# Patient Record
Sex: MALE | Race: WHITE | NOT HISPANIC OR LATINO | Employment: OTHER | URBAN - METROPOLITAN AREA
[De-identification: names, ages, dates, MRNs, and addresses within clinical notes are randomized per-mention and may not be internally consistent; named-entity substitution may affect disease eponyms.]

---

## 2017-10-07 ENCOUNTER — HOSPITAL ENCOUNTER (EMERGENCY)
Facility: HOSPITAL | Age: 72
Discharge: HOME | End: 2017-10-07
Attending: EMERGENCY MEDICINE | Admitting: EMERGENCY MEDICINE
Payer: MEDICARE

## 2017-10-07 ENCOUNTER — APPOINTMENT (EMERGENCY)
Dept: CT IMAGING | Facility: HOSPITAL | Age: 72
End: 2017-10-07
Payer: MEDICARE

## 2017-10-07 VITALS
DIASTOLIC BLOOD PRESSURE: 97 MMHG | RESPIRATION RATE: 20 BRPM | WEIGHT: 234.4 LBS | SYSTOLIC BLOOD PRESSURE: 201 MMHG | TEMPERATURE: 98 F | HEART RATE: 69 BPM | OXYGEN SATURATION: 97 %

## 2017-10-07 DIAGNOSIS — R10.9 ABDOMINAL PAIN, UNSPECIFIED ABDOMINAL LOCATION: Primary | ICD-10-CM

## 2017-10-07 LAB
ALBUMIN SERPL BCP-MCNC: 3.7 G/DL (ref 3.5–5)
ALP SERPL-CCNC: 122 U/L (ref 46–116)
ALT SERPL W P-5'-P-CCNC: 29 U/L (ref 12–78)
ANION GAP SERPL CALCULATED.3IONS-SCNC: 5 MMOL/L (ref 4–13)
APTT PPP: 33 SECONDS (ref 23–35)
AST SERPL W P-5'-P-CCNC: 16 U/L (ref 5–45)
BASOPHILS # BLD AUTO: 0.06 THOUSANDS/ΜL (ref 0–0.1)
BASOPHILS NFR BLD AUTO: 1 % (ref 0–1)
BILIRUB SERPL-MCNC: 0.3 MG/DL (ref 0.2–1)
BUN SERPL-MCNC: 24 MG/DL (ref 5–25)
CALCIUM SERPL-MCNC: 8.9 MG/DL (ref 8.3–10.1)
CHLORIDE SERPL-SCNC: 102 MMOL/L (ref 100–108)
CO2 SERPL-SCNC: 28 MMOL/L (ref 21–32)
CREAT SERPL-MCNC: 1.15 MG/DL (ref 0.6–1.3)
EOSINOPHIL # BLD AUTO: 0.19 THOUSAND/ΜL (ref 0–0.61)
EOSINOPHIL NFR BLD AUTO: 2 % (ref 0–6)
ERYTHROCYTE [DISTWIDTH] IN BLOOD BY AUTOMATED COUNT: 14.7 % (ref 11.6–15.1)
EXT BILIRUBIN, UA: NORMAL
EXT BLOOD URINE: NORMAL
EXT GLUCOSE, UA: NORMAL
EXT KETONES: NORMAL
EXT NITRITE, UA: NORMAL
EXT PH, UA: 6.5
EXT PROTEIN, UA: NORMAL
EXT SPECIFIC GRAVITY, UA: 1005
EXT UROBILINOGEN: NORMAL
GFR SERPL CREATININE-BSD FRML MDRD: 63 ML/MIN/1.73SQ M
GLUCOSE SERPL-MCNC: 135 MG/DL (ref 65–140)
HCT VFR BLD AUTO: 37.4 % (ref 36.5–49.3)
HGB BLD-MCNC: 12.2 G/DL (ref 12–17)
INR PPP: 0.9 (ref 0.86–1.16)
LIPASE SERPL-CCNC: 102 U/L (ref 73–393)
LYMPHOCYTES # BLD AUTO: 1.61 THOUSANDS/ΜL (ref 0.6–4.47)
LYMPHOCYTES NFR BLD AUTO: 18 % (ref 14–44)
MCH RBC QN AUTO: 27.8 PG (ref 26.8–34.3)
MCHC RBC AUTO-ENTMCNC: 32.6 G/DL (ref 31.4–37.4)
MCV RBC AUTO: 85 FL (ref 82–98)
MONOCYTES # BLD AUTO: 0.77 THOUSAND/ΜL (ref 0.17–1.22)
MONOCYTES NFR BLD AUTO: 9 % (ref 4–12)
NEUTROPHILS # BLD AUTO: 6.18 THOUSANDS/ΜL (ref 1.85–7.62)
NEUTS SEG NFR BLD AUTO: 70 % (ref 43–75)
PLATELET # BLD AUTO: 210 THOUSANDS/UL (ref 149–390)
PMV BLD AUTO: 10.1 FL (ref 8.9–12.7)
POTASSIUM SERPL-SCNC: 3.7 MMOL/L (ref 3.5–5.3)
PROT SERPL-MCNC: 7.8 G/DL (ref 6.4–8.2)
PROTHROMBIN TIME: 12.4 SECONDS (ref 12.1–14.4)
RBC # BLD AUTO: 4.39 MILLION/UL (ref 3.88–5.62)
SODIUM SERPL-SCNC: 135 MMOL/L (ref 136–145)
WBC # BLD AUTO: 8.81 THOUSAND/UL (ref 4.31–10.16)
WBC # BLD EST: NORMAL 10*3/UL

## 2017-10-07 PROCEDURE — 36415 COLL VENOUS BLD VENIPUNCTURE: CPT | Performed by: PHYSICIAN ASSISTANT

## 2017-10-07 PROCEDURE — 81002 URINALYSIS NONAUTO W/O SCOPE: CPT | Performed by: PHYSICIAN ASSISTANT

## 2017-10-07 PROCEDURE — 99284 EMERGENCY DEPT VISIT MOD MDM: CPT

## 2017-10-07 PROCEDURE — 80053 COMPREHEN METABOLIC PANEL: CPT | Performed by: PHYSICIAN ASSISTANT

## 2017-10-07 PROCEDURE — 85025 COMPLETE CBC W/AUTO DIFF WBC: CPT | Performed by: PHYSICIAN ASSISTANT

## 2017-10-07 PROCEDURE — 74177 CT ABD & PELVIS W/CONTRAST: CPT

## 2017-10-07 PROCEDURE — 85730 THROMBOPLASTIN TIME PARTIAL: CPT | Performed by: PHYSICIAN ASSISTANT

## 2017-10-07 PROCEDURE — 85610 PROTHROMBIN TIME: CPT | Performed by: PHYSICIAN ASSISTANT

## 2017-10-07 PROCEDURE — 83690 ASSAY OF LIPASE: CPT | Performed by: PHYSICIAN ASSISTANT

## 2017-10-07 RX ORDER — TRAMADOL HYDROCHLORIDE 50 MG/1
50 TABLET ORAL EVERY 6 HOURS PRN
Qty: 12 TABLET | Refills: 0 | Status: SHIPPED | OUTPATIENT
Start: 2017-10-07 | End: 2017-10-17

## 2017-10-07 RX ORDER — PANTOPRAZOLE SODIUM 40 MG/1
40 TABLET, DELAYED RELEASE ORAL DAILY
Qty: 30 TABLET | Refills: 0 | Status: SHIPPED | OUTPATIENT
Start: 2017-10-07 | End: 2018-05-03

## 2017-10-07 RX ADMIN — IOHEXOL 100 ML: 350 INJECTION, SOLUTION INTRAVENOUS at 17:51

## 2017-10-07 NOTE — ED PROVIDER NOTES
History  Chief Complaint   Patient presents with    Abdominal Pain     Pt  c/o right sided abdominal pain x 3 days, increases when eating  79-year-old male presents to the emergency department with complaints of abdominal pain  States that he has had some right upper quadrant and epigastric burning pain in his abdomen over the past 3 days  States that it gets worse after eating  He denies any fevers at home  No nausea, vomiting, or diarrhea  Denies constipation  Previous significant past surgical history significant for cholecystectomy  States that 3 weeks ago he did have an injury to the area where he fell and had some pain in the anterior chest wall  States he has been taking Tylenol Motrin for pain at home with some relief  History provided by:  Patient   used: No        None       Past Medical History:   Diagnosis Date    Hypertension        Past Surgical History:   Procedure Laterality Date    CHOLECYSTECTOMY      LEG SURGERY         History reviewed  No pertinent family history  I have reviewed and agree with the history as documented  Social History   Substance Use Topics    Smoking status: Never Smoker    Smokeless tobacco: Never Used    Alcohol use No        Review of Systems   Constitutional: Negative for activity change, appetite change, chills and fever  HENT: Negative for congestion, dental problem, drooling, ear discharge, ear pain, mouth sores, nosebleeds, rhinorrhea, sore throat and trouble swallowing  Eyes: Negative for pain, discharge and itching  Respiratory: Negative for cough, chest tightness, shortness of breath and wheezing  Cardiovascular: Negative for chest pain and palpitations  Gastrointestinal: Positive for abdominal pain  Negative for blood in stool, constipation, diarrhea, nausea and vomiting  Endocrine: Negative for cold intolerance and heat intolerance     Genitourinary: Negative for difficulty urinating, dysuria, flank pain, frequency and urgency  Skin: Negative for rash and wound  Allergic/Immunologic: Negative for food allergies and immunocompromised state  Neurological: Negative for dizziness, seizures, syncope, weakness, numbness and headaches  Psychiatric/Behavioral: Negative for agitation, behavioral problems and confusion  Physical Exam  ED Triage Vitals   Temperature Pulse Respirations Blood Pressure SpO2   10/07/17 1556 10/07/17 1552 10/07/17 1552 10/07/17 1552 10/07/17 1552   98 °F (36 7 °C) 72 18 (!) 204/93 97 %      Temp src Heart Rate Source Patient Position - Orthostatic VS BP Location FiO2 (%)   -- 10/07/17 1552 10/07/17 1552 10/07/17 1552 --    Monitor Sitting Left arm       Pain Score       10/07/17 1656       6           Physical Exam   Constitutional: He is oriented to person, place, and time  He appears well-developed and well-nourished  No distress  HENT:   Head: Normocephalic and atraumatic  Right Ear: External ear normal    Left Ear: External ear normal    Mouth/Throat: Oropharynx is clear and moist  No oropharyngeal exudate  Eyes: Conjunctivae are normal    Neck: No JVD present  No tracheal deviation present  Cardiovascular: Normal rate, regular rhythm and normal heart sounds  Exam reveals no gallop and no friction rub  No murmur heard  Pulmonary/Chest: Effort normal and breath sounds normal  No respiratory distress  He has no wheezes  He has no rales  He exhibits no tenderness  Abdominal: Soft  Bowel sounds are normal  He exhibits no distension  There is tenderness in the right upper quadrant and epigastric area  There is no guarding  Musculoskeletal: Normal range of motion  He exhibits no edema, tenderness or deformity  Lymphadenopathy:     He has no cervical adenopathy  Neurological: He is alert and oriented to person, place, and time  Skin: Skin is warm and dry  No rash noted  He is not diaphoretic  No erythema  Psychiatric: He has a normal mood and affect   His behavior is normal    Nursing note and vitals reviewed  ED Medications  Medications   sodium chloride 0 9 % bolus 1,000 mL (1,000 mL Intravenous Not Given 10/7/17 1643)   iohexol (OMNIPAQUE) 350 MG/ML injection (MULTI-DOSE) 100 mL (100 mL Intravenous Given 10/7/17 1751)       Diagnostic Studies  Labs Reviewed   COMPREHENSIVE METABOLIC PANEL - Abnormal        Result Value Ref Range Status    Sodium 135 (*) 136 - 145 mmol/L Final    Alkaline Phosphatase 122 (*) 46 - 116 U/L Final    Potassium 3 7  3 5 - 5 3 mmol/L Final    Chloride 102  100 - 108 mmol/L Final    CO2 28  21 - 32 mmol/L Final    Anion Gap 5  4 - 13 mmol/L Final    BUN 24  5 - 25 mg/dL Final    Creatinine 1 15  0 60 - 1 30 mg/dL Final    Comment: Standardized to IDMS reference method    Glucose 135  65 - 140 mg/dL Final    Comment:   If the patient is fasting, the ADA then defines impaired fasting glucose as > 100 mg/dL and diabetes as > or equal to 123 mg/dL  Specimen collection should occur prior to Sulfasalazine administration due to the potential for falsely depressed results  Specimen collection should occur prior to Sulfapyridine administration due to the potential for falsely elevated results  Calcium 8 9  8 3 - 10 1 mg/dL Final    AST 16  5 - 45 U/L Final    Comment:   Specimen collection should occur prior to Sulfasalazine administration due to the potential for falsely depressed results  ALT 29  12 - 78 U/L Final    Comment:   Specimen collection should occur prior to Sulfasalazine administration due to the potential for falsely depressed results       Total Protein 7 8  6 4 - 8 2 g/dL Final    Albumin 3 7  3 5 - 5 0 g/dL Final    Total Bilirubin 0 30  0 20 - 1 00 mg/dL Final    eGFR 63  ml/min/1 73sq m Final    Narrative:     National Kidney Disease Education Program recommendations are as follows:  GFR calculation is accurate only with a steady state creatinine  Chronic Kidney disease less than 60 ml/min/1 73 sq  meters  Kidney failure less than 15 ml/min/1 73 sq  meters  CBC AND DIFFERENTIAL - Normal    WBC 8 81  4 31 - 10 16 Thousand/uL Final    RBC 4 39  3 88 - 5 62 Million/uL Final    Hemoglobin 12 2  12 0 - 17 0 g/dL Final    Hematocrit 37 4  36 5 - 49 3 % Final    MCV 85  82 - 98 fL Final    MCH 27 8  26 8 - 34 3 pg Final    MCHC 32 6  31 4 - 37 4 g/dL Final    RDW 14 7  11 6 - 15 1 % Final    MPV 10 1  8 9 - 12 7 fL Final    Platelets 834  967 - 390 Thousands/uL Final    Neutrophils Relative 70  43 - 75 % Final    Lymphocytes Relative 18  14 - 44 % Final    Monocytes Relative 9  4 - 12 % Final    Eosinophils Relative 2  0 - 6 % Final    Basophils Relative 1  0 - 1 % Final    Neutrophils Absolute 6 18  1 85 - 7 62 Thousands/µL Final    Lymphocytes Absolute 1 61  0 60 - 4 47 Thousands/µL Final    Monocytes Absolute 0 77  0 17 - 1 22 Thousand/µL Final    Eosinophils Absolute 0 19  0 00 - 0 61 Thousand/µL Final    Basophils Absolute 0 06  0 00 - 0 10 Thousands/µL Final   LIPASE - Normal    Lipase 102  73 - 393 u/L Final   PROTIME-INR - Normal    Protime 12 4  12 1 - 14 4 seconds Final    INR 0 90  0 86 - 1 16 Final   APTT - Normal    PTT 33  23 - 35 seconds Final    Narrative: Therapeutic Heparin Range = 60-90 seconds   POCT URINALYSIS DIPSTICK - Normal    EXT Glucose, UA neg   Final    EXT Bilirubin, UA (Ref: Negative) neg   Final    EXT Ketones, UA (Ref: Negative) neg   Final    EXT Spec Grav, UA 1,005   Final    EXT Blood, UA (Ref: Negative) neg   Final    EXT pH, UA 6 5   Final    EXT Protein, UA (Ref: Negative) neg   Final    EXT Urobilinogen, UA (Ref: 0 2- 1 0) neg   Final    EXT Leukocytes, UA (Ref: Negative) neg   Final    EXT Nitrite, UA (Ref: Negative) neg   Final       CT abdomen pelvis with contrast   Final Result      Colonic diverticulosis with no evidence of acute diverticulitis  No signs of bowel obstruction or inflammation  Moderate degenerative disc disease of the thoracolumbar spine  Workstation performed: [id number]             Procedures  Procedures      Phone Contacts  ED Phone Contact    ED Course  ED Course                                MDM  Number of Diagnoses or Management Options  Diagnosis management comments: Differential diagnosis includes but not limited to:  Gastritis, pancreatitis, intra-abdominal injury, chest wall pain, ulcer formation  Amount and/or Complexity of Data Reviewed  Clinical lab tests: ordered and reviewed  Tests in the radiology section of CPT®: ordered and reviewed      CritCare Time    Disposition  Final diagnoses:   Abdominal pain, unspecified abdominal location     ED Disposition     ED Disposition Condition Comment    Discharge  Juan Álvarez discharge to home/self care  Condition at discharge: Stable        Follow-up Information     Follow up With Specialties Details Why [address] MD Family Medicine Schedule an appointment as soon as possible for a visit  [facility] [address] [place] [id number]  [phone]          Patient's Medications   Discharge Prescriptions    TRAMADOL (ULTRAM) 50 MG TABLET    Take 1 tablet by mouth every 6 (six) hours as needed for moderate pain for up to 10 days       Start Date: 10/7/2017 End Date: 10/17/2017       Order Dose: 50 mg       Quantity: 12 tablet    Refills: 0     No discharge procedures on file      ED Provider  Electronically Signed by       Deidra Cockayne, PA-C  10/07/17 6618

## 2017-10-07 NOTE — DISCHARGE INSTRUCTIONS
Abdominal Pain   WHAT YOU NEED TO KNOW:   Abdominal pain can be dull, achy, or sharp  You may have pain in one area of your abdomen, or in your entire abdomen  Your pain may be caused by a condition such as constipation, food sensitivity or poisoning, infection, or a blockage  Abdominal pain can also be from a hernia, appendicitis, or an ulcer  Liver, gallbladder, or kidney conditions can also cause abdominal pain  The cause of your abdominal pain may be unknown  DISCHARGE INSTRUCTIONS:   Return to the emergency department if:   · You have new chest pain or shortness of breath  · You have pulsing pain in your upper abdomen or lower back that suddenly becomes constant  · Your pain is in the right lower abdominal area and worsens with movement  · You have a fever over 100 4°F (38°C) or shaking chills  · You are vomiting and cannot keep food or liquids down  · Your pain does not improve or gets worse over the next 8 to 12 hours  · You see blood in your vomit or bowel movements, or they look black and tarry  · Your skin or the whites of your eyes turn yellow  · You are a woman and have a large amount of vaginal bleeding that is not your monthly period  Contact your healthcare provider if:   · You have pain in your lower back  · You are a man and have pain in your testicles  · You have pain when you urinate  · You have questions or concerns about your condition or care  Follow up with your healthcare provider within 24 hours or as directed:  Write down your questions so you remember to ask them during your visits  Medicines:   · Medicines  may be given to calm your stomach and prevent vomiting or to decrease pain  Ask how to take pain medicine safely  · Take your medicine as directed  Contact your healthcare provider if you think your medicine is not helping or if you have side effects  Tell him of her if you are allergic to any medicine   Keep a list of the medicines, vitamins, and herbs you take  Include the amounts, and when and why you take them  Bring the list or the pill bottles to follow-up visits  Carry your medicine list with you in case of an emergency  © 2017 [address] Information is for End User's use only and may not be sold, redistributed or otherwise used for commercial purposes  All illustrations and images included in CareNotes® are the copyrighted property of [employer] M [employer]  or Alvin Young  The above information is an  only  It is not intended as medical advice for individual conditions or treatments  Talk to your doctor, nurse or pharmacist before following any medical regimen to see if it is safe and effective for you

## 2018-05-03 ENCOUNTER — HOSPITAL ENCOUNTER (EMERGENCY)
Facility: HOSPITAL | Age: 73
Discharge: HOME | End: 2018-05-04
Attending: EMERGENCY MEDICINE | Admitting: EMERGENCY MEDICINE
Payer: MEDICARE

## 2018-05-03 ENCOUNTER — APPOINTMENT (EMERGENCY)
Dept: RADIOLOGY | Facility: HOSPITAL | Age: 73
End: 2018-05-03
Payer: MEDICARE

## 2018-05-03 VITALS
DIASTOLIC BLOOD PRESSURE: 84 MMHG | OXYGEN SATURATION: 96 % | TEMPERATURE: 98.4 F | HEART RATE: 78 BPM | RESPIRATION RATE: 20 BRPM | BODY MASS INDEX: 36.26 KG/M2 | WEIGHT: 231.48 LBS | SYSTOLIC BLOOD PRESSURE: 170 MMHG

## 2018-05-03 DIAGNOSIS — S73.015A: Primary | ICD-10-CM

## 2018-05-03 PROCEDURE — 96374 THER/PROPH/DIAG INJ IV PUSH: CPT

## 2018-05-03 PROCEDURE — 73502 X-RAY EXAM HIP UNI 2-3 VIEWS: CPT

## 2018-05-03 PROCEDURE — 73501 X-RAY EXAM HIP UNI 1 VIEW: CPT

## 2018-05-03 RX ORDER — PROPOFOL 10 MG/ML
100 INJECTION, EMULSION INTRAVENOUS ONCE
Status: COMPLETED | OUTPATIENT
Start: 2018-05-03 | End: 2018-05-03

## 2018-05-03 RX ADMIN — PROPOFOL 100 MG: 10 INJECTION, EMULSION INTRAVENOUS at 23:14

## 2018-05-03 RX ADMIN — HYDROMORPHONE HYDROCHLORIDE 1 MG: 1 INJECTION, SOLUTION INTRAMUSCULAR; INTRAVENOUS; SUBCUTANEOUS at 22:37

## 2018-05-04 PROCEDURE — 99284 EMERGENCY DEPT VISIT MOD MDM: CPT

## 2018-05-04 NOTE — ED PROCEDURE NOTE
PROCEDURE  Procedural Sedation  Date/Time: 5/3/2018 11:29 PM  Performed by: Kristel Ralph  Authorized by: Kristel Ralph     Consent:     Consent obtained:  Written    Consent given by:  Patient  Universal protocol:     Patient identity confirmation method:  Verbally with patient  Indications:     Sedation purpose:  Dislocation reduction    Procedure necessitating sedation performed by:  Different physician    Intended level of sedation:  Moderate (conscious sedation)  Pre-sedation assessment:     ASA classification: class 2 - patient with mild systemic disease      Mallampati score:  III - soft palate, base of uvula visible    Pre-sedation assessments completed and reviewed: airway patency, cardiovascular function, mental status, nausea/vomiting, respiratory function and temperature    Immediate pre-procedure details:     Reassessment: Patient reassessed immediately prior to procedure      Reviewed: vital signs      Verified: bag valve mask available, emergency equipment available, intubation equipment available, IV patency confirmed, oxygen available and suction available    Procedure details (see MAR for exact dosages):     Preoxygenation:  Nasal cannula    Sedation:  Propofol    Intra-procedure monitoring:  Blood pressure monitoring, cardiac monitor, continuous pulse oximetry, continuous capnometry, frequent vital sign checks and frequent LOC assessments    Intra-procedure events: none    Post-procedure details:     Attendance: Constant attendance by certified staff until patient recovered      Recovery: Patient returned to pre-procedure baseline      Post-sedation assessments completed and reviewed: airway patency, cardiovascular function, hydration status, mental status, nausea/vomiting and respiratory function      Patient tolerance:   Tolerated well, no immediate complications         Dell Pelayo MD  05/03/18 0675

## 2018-05-04 NOTE — DISCHARGE INSTRUCTIONS
Hip Dislocation   WHAT YOU NEED TO KNOW:   A hip dislocation occurs when your thigh bone is forced out of your hip socket  DISCHARGE INSTRUCTIONS:   Medicines: You may need any of the following:  · Pain medicine  helps decrease or take away your pain  Do not wait until the pain is severe before you take this medicine  · Take your medicine as directed  Contact your healthcare provider if you think your medicine is not helping or if you have side effects  Tell him of her if you are allergic to any medicine  Keep a list of the medicines, vitamins, and herbs you take  Include the amounts, and when and why you take them  Bring the list or the pill bottles to follow-up visits  Carry your medicine list with you in case of an emergency  Follow up with your healthcare provider or orthopedist as directed: You may need another x-ray or CT scan  Write down your questions so you remember to ask them during your visits  Self-care: It will take 2 to 3 months for your hip to heal   · Use a walker or crutches as directed  Ask your healthcare provider or orthopedist when you can put weight on your injured side  As your hip heals, use a cane to help you walk until your limp goes away  · Avoid high-impact activities and sports  for 6 to 12 weeks or until your hip strength has returned  Physical therapy:  Go to physical therapy as directed  A physical therapist teaches you exercises to increase the range of motion in your hip  Exercises also make your hip stronger and decrease pain  Prevent another hip dislocation:  Follow these precautions for 6 weeks after your injury or as directed:  · Sit with your back straight and your feet flat on the floor  Do not cross your legs  Do not lean forward when you sit in a chair  · Keep your knees apart  Place a pillow or wedge between your knees when you sit or lie down  Do not twist your knees  Do not lift your knees higher than your hips  · Do not sit in a low chair  Use armrests and your upper body strength to push yourself up from a sitting position  · Do not bend at the waist to  an object from the floor  Bend your knees to reach the object, or use a tool to pick it up  Wound care: If you had surgery, ask how to care for your wound  Carefully wash the wound with soap and water  Dry the area and put on new, clean bandages as directed  Change your bandages when they get wet or dirty  Contact your healthcare provider if:   · You have a fever  · Your incision is red, swollen, or draining pus  · You have numbness in your leg or foot  · You have pain that does not go away after you take pain medicine  · You cannot walk well with your cane or crutches  · You have questions or concerns about your condition or care  Return to the emergency department if:   · You have severe pain  · You dislocate your hip again  · Your incision comes apart, or blood soaks through your bandage  © 2017 [facility] Information is for End User's use only and may not be sold, redistributed or otherwise used for commercial purposes  All illustrations and images included in CareNotes® are the copyrighted property of A D [employer] M , Inc  or Alvindariana Young  The above information is an  only  It is not intended as medical advice for individual conditions or treatments  Talk to your doctor, nurse or pharmacist before following any medical regimen to see if it is safe and effective for you  Moderate Sedation   WHAT YOU NEED TO KNOW:   Moderate sedation, or conscious sedation, is medicine used during procedures to help you feel relaxed and calm  You will be awake and able to follow directions without anxiety or pain  You will remember little to none of the procedure  You may feel tired, weak, or unsteady on your feet after you get sedation  You may also have trouble concentrating or short-term memory loss   These symptoms should go away in 24 hours or less  DISCHARGE INSTRUCTIONS:   Call 911 or have someone else call for any of the following:   · You have sudden trouble breathing  · You cannot be woken  Return to the emergency department if:   · You have a severe headache or dizziness  · Your heart is beating faster than usual   Contact your healthcare provider if:   · You have a fever  · You have nausea or are vomiting for more than 8 hours after the procedure  · Your skin is itchy, swollen, or you have a rash  · You have questions or concerns about your condition or care  Self-care:   · Have someone stay with you for 24 hours  This person can drive you to errands and help you do things around the house  This person can also watch for problems  · Rest and do quiet activities for 24 hours  Do not exercise, ride a bike, or play sports  Stand up slowly to prevent dizziness and falls  Take short walks around the house with another person  Slowly return to your usual activities the next day  · Do not drive or use dangerous machines or tools for 24 hours  You may injure yourself or others  Examples include a lawnmower, saw, or drill  Do not return to work for 24 hours if you use dangerous machines or tools for work  · Do not make important decisions for 24 hours  For example, do not sign important papers or invest money  · Drink liquids as directed  Liquids help flush the sedation medicine out of your body  Ask how much liquid to drink each day and which liquids are best for you  · Eat small, frequent meals to prevent nausea and vomiting  Start with clear liquids such as juice or broth  If you do not vomit after clear liquids, you can eat your usual foods  · Do not drink alcohol or take medicines that make you drowsy  This includes medicines that help you sleep and anxiety medicines  Ask your healthcare provider if it is safe for you to take pain medicine    Follow up with your healthcare provider as directed:  Write down your questions so you remember to ask them during your visits  © 2017 [address] Information is for End User's use only and may not be sold, redistributed or otherwise used for commercial purposes  All illustrations and images included in CareNotes® are the copyrighted property of A [employer]  or Alvin Young  The above information is an  only  It is not intended as medical advice for individual conditions or treatments  Talk to your doctor, nurse or pharmacist before following any medical regimen to see if it is safe and effective for you

## 2018-05-04 NOTE — ED PROVIDER NOTES
History  Chief Complaint   Patient presents with    Hip Injury     Patient was dressing at [facility] when left hip popped out  Had hip replacement in February 2018  Left leg shortened and externally rotated  Hip Pain   Location:  Left dislocation  Quality:  Sharp  Severity:  Severe  Onset quality:  Sudden  Duration:  1 hour  Timing:  Constant  Progression:  Unchanged  Chronicity:  Recurrent  Context:  Prior left hip replacement  Relieved by:  Nothing  Worsened by: Movement  Associated symptoms: no abdominal pain, no chest pain, no cough, no diarrhea, no ear pain, no fatigue, no fever, no headaches, no nausea, no rash, no shortness of breath, no sore throat and no vomiting        None       Past Medical History:   Diagnosis Date    Hypertension        Past Surgical History:   Procedure Laterality Date    CHOLECYSTECTOMY      JOINT REPLACEMENT      LEG SURGERY         History reviewed  No pertinent family history  I have reviewed and agree with the history as documented  Social History   Substance Use Topics    Smoking status: Never Smoker    Smokeless tobacco: Never Used    Alcohol use No        Review of Systems   Constitutional: Negative for activity change, appetite change, chills, fatigue and fever  HENT: Negative for ear pain, sneezing and sore throat  Eyes: Negative for pain and visual disturbance  Respiratory: Negative for cough and shortness of breath  Cardiovascular: Negative for chest pain and palpitations  Gastrointestinal: Negative for abdominal pain, blood in stool, constipation, diarrhea, nausea and vomiting  Genitourinary: Negative for dysuria and hematuria  Musculoskeletal: Positive for arthralgias  Negative for neck pain and neck stiffness  Skin: Negative for rash and wound  Neurological: Negative for dizziness, weakness, light-headedness, numbness and headaches  All other systems reviewed and are negative        Physical Exam  ED Triage Vitals   Temperature Pulse Respirations Blood Pressure SpO2   05/03/18 2205 05/03/18 2204 05/03/18 2204 05/03/18 2204 05/03/18 2204   98 4 °F (36 9 °C) 78 20 (!) 196/93 96 %      Temp Source Heart Rate Source Patient Position - Orthostatic VS BP Location FiO2 (%)   05/03/18 2205 05/03/18 2204 05/03/18 2204 05/03/18 2204 --   Oral Monitor Lying Right arm       Pain Score       05/03/18 2204       Worst Possible Pain           Orthostatic Vital Signs  Vitals:    05/03/18 2329 05/03/18 2329 05/03/18 2334 05/03/18 2345   BP: (!) 184/81  170/84 170/84   Pulse: 77 72 83 78   Patient Position - Orthostatic VS: Lying  Lying        Physical Exam   Constitutional: He is oriented to person, place, and time  He appears well-developed and well-nourished  No distress  HENT:   Head: Normocephalic and atraumatic  Nose: Nose normal    Eyes: Conjunctivae and EOM are normal  Pupils are equal, round, and reactive to light  Neck: Normal range of motion  Neck supple  Cardiovascular: Normal rate, regular rhythm, normal heart sounds and intact distal pulses  Exam reveals no gallop and no friction rub  No murmur heard  Pulmonary/Chest: Effort normal and breath sounds normal  No respiratory distress  He has no wheezes  He has no rales  Abdominal: Soft  He exhibits no distension  There is no tenderness  Musculoskeletal: Normal range of motion  He exhibits tenderness  He exhibits no edema or deformity  Shortened, externally rotated left leg   Lymphadenopathy:     He has no cervical adenopathy  Neurological: He is alert and oriented to person, place, and time  No cranial nerve deficit or sensory deficit  He exhibits normal muscle tone  Skin: Skin is warm and dry  Capillary refill takes less than 2 seconds  He is not diaphoretic  No erythema  No pallor  Nursing note and vitals reviewed        ED Medications  Medications    EMS REPLENISHMENT MED (not administered)   HYDROmorphone (DILAUDID) injection 1 mg (1 mg Intravenous Given 5/3/18 2237) propofol (DIPRIVAN) 200 MG/20ML bolus injection 100 mg (100 mg Intravenous Given by Other 5/3/18 5189)       Diagnostic Studies  Results Reviewed     None                 XR hip/pelv 2-3 vws left    (Results Pending)   XR hip/pelv 1 vw LEFT if performed    (Results Pending)              Procedures  Orthopedic Injury  Date/Time: 5/3/2018 11:32 PM  Performed by: Theodore Samuel by: Ly JUAN   Consent: Verbal consent obtained  Written consent obtained  Risks and benefits: risks, benefits and alternatives were discussed  Consent given by: patient  Site marked: the operative site was marked  Imaging studies: imaging studies available  Patient identity confirmed: verbally with patient  Time out: Immediately prior to procedure a "time out" was called to verify the correct patient, procedure, equipment, support staff and site/side marked as required    Injury location: hip  Location details: left hip  Injury type: dislocation  Dislocation type: posterior  Spontaneous dislocation: yes  Prosthesis: yes  Pre-procedure neurovascular assessment: neurovascularly intact  Pre-procedure distal perfusion: normal  Pre-procedure neurological function: normal  Pre-procedure range of motion: reduced    Sedation:  Sedation Type: moderate (conscious) sedation (See separate Procedural Sedation form)Manipulation performed: yes  Reduction method: Whistler maneuver  Skeletal traction used: yes  Reduction successful: yes  X-ray confirmed reduction: yes  Immobilization: knee immobilizer  Post-procedure neurovascular assessment: post-procedure neurovascularly intact  Post-procedure distal perfusion: normal  Post-procedure neurological function: normal  Post-procedure range of motion: normal  Patient tolerance: Patient tolerated the procedure well with no immediate complications             Phone Contacts  ED Phone Contact    ED Course                               MDM  Number of Diagnoses or Management Options  Posterior dislocation of hip, closed, left, initial encounter [facility]): new and requires workup  Diagnosis management comments: Patient is a 79-year-old male with past medical history of hypertension and left hip replacement presents emergency department for hip dislocation  Patient had total left hip replacement in February 2018  He states 3 days after the procedure he had his first dislocation  Since then, he has not had any further dislocations  He states he is at the [facility] today drying off after shower when he bent down to drove his legs and he had a dislocation of his left hip  He did not fall was able to sit down onto a nearby bench  Patient notes shortened left leg that is externally rotated  Full sensation distally  2+ pulses distal with cap refill less 2 seconds  Patient has no cardiac or pulmonary medical history  Patient has not had any prior adverse reactions to anesthesia  Consent was obtained for procedure in for sedation  Plan will be to perform an emergency department reduction  Will place patient in knee immobilizer post procedure and give him ortho to follow up with  Amount and/or Complexity of Data Reviewed  Tests in the radiology section of CPT®: ordered and reviewed    Risk of Complications, Morbidity, and/or Mortality  Presenting problems: high  Diagnostic procedures: moderate  Management options: high    Patient Progress  Patient progress: resolved    CritCare Time    Disposition  Final diagnoses:   Posterior dislocation of hip, closed, left, initial encounter [facility])     Time reflects when diagnosis was documented in both MDM as applicable and the Disposition within this note     Time User Action Codes Description Comment    5/3/2018 11:42 PM Joseph Martinez Add [[id number]] Posterior dislocation of hip, closed, left, initial encounter [facility])       ED Disposition     ED Disposition Condition Comment    Discharge  Faisal Lilly discharge to home/self care      Condition at discharge: Stable        Follow-up Information     Follow up With Specialties Details Why Contact Info Additional [address] [facility] Orthopedic Surgery Call to establish care with orthopaedics [place] [address] [place] [phone] [id number] Emergency Department Emergency Medicine  If symptoms worsen [address] [id number] [phone] [facility] ED,  [address], [place], [place], [address]        Patient's Medications   Discharge Prescriptions    No medications on file     No discharge procedures on file      ED Provider  Electronically Signed by           Martina Milner PA-C  05/04/18 0001

## 2018-05-24 ENCOUNTER — OFFICE VISIT (OUTPATIENT)
Dept: FAMILY MEDICINE CLINIC | Facility: CLINIC | Age: 73
End: 2018-05-24
Payer: MEDICARE

## 2018-05-24 VITALS
SYSTOLIC BLOOD PRESSURE: 144 MMHG | HEIGHT: 64 IN | WEIGHT: 226.2 LBS | RESPIRATION RATE: 16 BRPM | HEART RATE: 64 BPM | BODY MASS INDEX: 38.62 KG/M2 | DIASTOLIC BLOOD PRESSURE: 84 MMHG | TEMPERATURE: 99 F

## 2018-05-24 DIAGNOSIS — N20.0 NEPHROLITHIASIS: ICD-10-CM

## 2018-05-24 DIAGNOSIS — Z96.642 HISTORY OF TOTAL LEFT HIP ARTHROPLASTY: ICD-10-CM

## 2018-05-24 DIAGNOSIS — I10 ESSENTIAL HYPERTENSION: Primary | ICD-10-CM

## 2018-05-24 DIAGNOSIS — E66.9 OBESITY (BMI 35.0-39.9 WITHOUT COMORBIDITY): ICD-10-CM

## 2018-05-24 PROBLEM — M47.816 LUMBAR SPONDYLOSIS: Status: ACTIVE | Noted: 2018-05-24

## 2018-05-24 PROCEDURE — 99204 OFFICE O/P NEW MOD 45 MIN: CPT | Performed by: FAMILY MEDICINE

## 2018-05-24 RX ORDER — ASPIRIN 81 MG/1
81 TABLET ORAL DAILY
COMMUNITY

## 2018-05-24 RX ORDER — LOSARTAN POTASSIUM AND HYDROCHLOROTHIAZIDE 12.5; 1 MG/1; MG/1
TABLET ORAL
COMMUNITY
Start: 2018-04-09 | End: 2018-05-24 | Stop reason: SDUPTHER

## 2018-05-24 RX ORDER — LOSARTAN POTASSIUM AND HYDROCHLOROTHIAZIDE 12.5; 1 MG/1; MG/1
1 TABLET ORAL DAILY
Qty: 30 TABLET | Refills: 6 | Status: SHIPPED | OUTPATIENT
Start: 2018-05-24 | End: 2019-04-19 | Stop reason: SDUPTHER

## 2018-05-24 RX ORDER — MULTIVITAMIN
1 TABLET ORAL DAILY
COMMUNITY

## 2018-05-24 NOTE — ASSESSMENT & PLAN NOTE
Recommended patient to follow a low carb, low-fat diet  Encouraged regular exercise, weight reduction

## 2018-05-24 NOTE — PROGRESS NOTES
Assessment/Plan:    Obesity (BMI 35 0-39 9 without comorbidity)  Recommended patient to follow a low carb, low-fat diet  Encouraged regular exercise, weight reduction  Nephrolithiasis  Patient has history of kidney stones  He is asymptomatic now  He was previously seen by urologist in [place]  Recommended to stay well hydrated  Essential hypertension  BP is stable  Continue Losartan/HCTZ 100/12 5 mg 1 tablet daily  Recommended to follow a low-sodium diet, lose weight  Check labs  HM:  colonoscopy done at age 71  No prior history of colon polyps  Recommended Prevnar 13 vaccination, Flu shot annually  Will administer immunizations at the next visit  Schedule follow-up office visit in 4 months  Diagnoses and all orders for this visit:    Essential hypertension  -     CBC and differential; Future  -     Comprehensive metabolic panel; Future  -     TSH, 3rd generation with T4 reflex; Future  -     Lipid panel; Future  -     losartan-hydrochlorothiazide (HYZAAR) 100-12 5 MG per tablet; Take 1 tablet by mouth daily    History of total left hip arthroplasty    Obesity (BMI 35 0-39 9 without comorbidity)    Nephrolithiasis    Other orders  -     Discontinue: losartan-hydrochlorothiazide (HYZAAR) 100-12 5 MG per tablet;   -     aspirin (ECOTRIN LOW STRENGTH) 81 mg EC tablet; Take 81 mg by mouth daily  -     Multiple Vitamin (MULTIVITAMIN) tablet; Take 1 tablet by mouth daily          Subjective:      Patient ID: Raul Diggs is a 68 y o  male  HPI     New patient presents to [facility]"" medical [facility]  Patient lives in [place] from November to May  He has H/o HTN, Obesity, osteoarthritis, S/P L TKA, kidney stones  HTN - patient takes Losartan/ HCTZ 100/12 5 mg one tablet daily, Aspirin 81 mg daily, multivitamins  Denies chest pain, shortness of breath, dizziness  Patient  was seen previous by primary care physician in [place]  No recent blood work    Denies tobacco use  Family history is positive for CHF, CAD in his mother and coronary artery disease in his father  Patient had colonoscopy at age 71  No prior history of colon polyps  No family history of colon cancer  The following portions of the patient's history were reviewed and updated as appropriate: allergies, current medications, past family history, past medical history, past social history, past surgical history and problem list     Review of Systems   Constitutional: Negative for activity change, appetite change, chills, fatigue and fever  HENT: Negative for congestion, ear pain, hearing loss, nosebleeds, sinus pressure, sore throat, tinnitus and trouble swallowing  Eyes: Negative for pain, discharge, redness, itching and visual disturbance  Respiratory: Negative for cough, chest tightness, shortness of breath and wheezing  Cardiovascular: Negative for chest pain, palpitations and leg swelling  Gastrointestinal: Negative for abdominal pain, blood in stool, constipation, diarrhea, nausea and vomiting  Genitourinary: Negative for difficulty urinating, dysuria, flank pain, frequency and hematuria  Nocturia   Musculoskeletal: Positive for arthralgias (mild pain in L hip)  Negative for back pain, gait problem and myalgias  Skin: Negative for rash and wound  Neurological: Negative for dizziness, syncope, weakness and headaches  Hematological: Negative  Psychiatric/Behavioral: Negative  Objective:    /84 (BP Location: Left arm, Patient Position: Sitting, Cuff Size: Large)   Pulse 64   Temp 99 °F (37 2 °C) (Tympanic)   Resp 16   Ht 5' 4" (1 626 m)   Wt 103 kg (226 lb 3 2 oz)   BMI 38 83 kg/m²          Physical Exam   Constitutional: He is oriented to person, place, and time  He appears well-developed and well-nourished  Obese   HENT:   Head: Normocephalic and atraumatic     Right Ear: External ear normal    Left Ear: External ear normal    Mouth/Throat: Oropharynx is clear and moist    Eyes: Conjunctivae are normal  Pupils are equal, round, and reactive to light  Neck: Normal range of motion  Neck supple  No JVD present  Cardiovascular: Normal rate, regular rhythm and normal heart sounds  No murmur heard  No carotid bruits BL  Trace pretibial edema BL LE   Pulmonary/Chest: Effort normal and breath sounds normal  He has no wheezes  He has no rales  Abdominal: Soft  Bowel sounds are normal  There is no tenderness  Musculoskeletal: He exhibits no tenderness or deformity  Patient walks with a slight limp   Neurological: He is alert and oriented to person, place, and time  Coordination normal    Skin: Skin is warm and dry  No rash noted  Psychiatric: He has a normal mood and affect  His behavior is normal    Nursing note and vitals reviewed

## 2018-05-24 NOTE — ASSESSMENT & PLAN NOTE
BP is stable  Continue Losartan/HCTZ 100/12 5 mg 1 tablet daily  Recommended to follow a low-sodium diet, lose weight  Check labs

## 2018-05-24 NOTE — ASSESSMENT & PLAN NOTE
Patient has history of kidney stones  He is asymptomatic now  He was previously seen by urologist in [place]  Recommended to stay well hydrated

## 2018-07-14 ENCOUNTER — APPOINTMENT (EMERGENCY)
Dept: RADIOLOGY | Facility: HOSPITAL | Age: 73
End: 2018-07-14
Payer: MEDICARE

## 2018-07-14 ENCOUNTER — HOSPITAL ENCOUNTER (EMERGENCY)
Facility: HOSPITAL | Age: 73
Discharge: HOME | End: 2018-07-14
Attending: EMERGENCY MEDICINE | Admitting: EMERGENCY MEDICINE
Payer: MEDICARE

## 2018-07-14 VITALS
WEIGHT: 225.53 LBS | BODY MASS INDEX: 37.58 KG/M2 | HEART RATE: 83 BPM | OXYGEN SATURATION: 98 % | TEMPERATURE: 98.1 F | HEIGHT: 65 IN | SYSTOLIC BLOOD PRESSURE: 209 MMHG | RESPIRATION RATE: 18 BRPM | DIASTOLIC BLOOD PRESSURE: 100 MMHG

## 2018-07-14 DIAGNOSIS — S73.035A ANTERIOR DISLOCATION OF LEFT HIP, INITIAL ENCOUNTER (HCC): Primary | ICD-10-CM

## 2018-07-14 PROCEDURE — 99284 EMERGENCY DEPT VISIT MOD MDM: CPT

## 2018-07-14 PROCEDURE — 73502 X-RAY EXAM HIP UNI 2-3 VIEWS: CPT

## 2018-07-14 RX ORDER — PROPOFOL 10 MG/ML
400 INJECTION, EMULSION INTRAVENOUS ONCE
Status: COMPLETED | OUTPATIENT
Start: 2018-07-14 | End: 2018-07-14

## 2018-07-14 RX ADMIN — PROPOFOL 400 MG: 10 INJECTION, EMULSION INTRAVENOUS at 19:40

## 2018-07-14 NOTE — ED PROVIDER NOTES
History  Chief Complaint   Patient presents with    Hip Injury     Hx  left hip replacement in February with x3 dislocations (lastin May)  Brought by EMS today s/p fall from standing position after feeling left hip "pop" out  Denies any dizziness/HA/LOC  Denies hitting head  History provided by:  Patient and EMS personnel  Hip Pain   Location:  Left hip  Quality:  Dull  Severity:  Severe  Onset quality:  Sudden  Duration:  30 minutes  Timing:  Constant  Progression:  Improving  Chronicity:  Recurrent  Context:  Patient went from a sitting to a standing position, felt my hip dislocate  Patient with 2 prior dislocations in the past   Patient with a prosthetic hip  Relieved by:  Pre-hospital fentanyl  Worsened by: Attempted any movement  Associated symptoms: no abdominal pain, no chest pain, no congestion, no cough, no diarrhea, no fever, no headaches, no nausea, no rash, no shortness of breath, no sore throat, no vomiting and no wheezing        Prior to Admission Medications   Prescriptions Last Dose Informant Patient Reported? Taking? Multiple Vitamin (MULTIVITAMIN) tablet   Yes Yes   Sig: Take 1 tablet by mouth daily   aspirin (ECOTRIN LOW STRENGTH) 81 mg EC tablet   Yes Yes   Sig: Take 81 mg by mouth daily   losartan-hydrochlorothiazide (HYZAAR) 100-12 5 MG per tablet   No Yes   Sig: Take 1 tablet by mouth daily      Facility-Administered Medications: None       Past Medical History:   Diagnosis Date    Hypertension     Kidney stone     Obesity        Past Surgical History:   Procedure Laterality Date    CHOLECYSTECTOMY      JOINT REPLACEMENT      LEG SURGERY         Family History   Problem Relation Age of Onset    Coronary artery disease Mother     Heart failure Mother     Coronary artery disease Father      I have reviewed and agree with the history as documented      Social History   Substance Use Topics    Smoking status: Never Smoker    Smokeless tobacco: Never Used    Alcohol use No        Review of Systems   Constitutional: Negative for activity change, chills, diaphoresis and fever  HENT: Negative for congestion, sinus pressure and sore throat  Eyes: Negative for pain and visual disturbance  Respiratory: Negative for cough, chest tightness, shortness of breath, wheezing and stridor  Cardiovascular: Negative for chest pain and palpitations  Gastrointestinal: Negative for abdominal distention, abdominal pain, constipation, diarrhea, nausea and vomiting  Genitourinary: Negative for dysuria and frequency  Musculoskeletal: Negative for neck pain and neck stiffness  Skin: Negative for rash  Neurological: Negative for dizziness, speech difficulty, light-headedness, numbness and headaches  Physical Exam  Physical Exam   Constitutional: He is oriented to person, place, and time  He appears well-developed  No distress  HENT:   Head: Normocephalic and atraumatic  Eyes: Pupils are equal, round, and reactive to light  Neck: Normal range of motion  Neck supple  No tracheal deviation present  Cardiovascular: Normal rate, regular rhythm, normal heart sounds and intact distal pulses  No murmur heard  Pulmonary/Chest: Effort normal and breath sounds normal  No stridor  No respiratory distress  Abdominal: Soft  He exhibits no distension  There is no tenderness  There is no rebound and no guarding  Musculoskeletal: Normal range of motion  He exhibits deformity  Left lower extremity shortened, consistent with hip dislocation   Neurological: He is alert and oriented to person, place, and time  Skin: Skin is warm and dry  He is not diaphoretic  No erythema  No pallor  Psychiatric: He has a normal mood and affect  Vitals reviewed        Vital Signs  ED Triage Vitals   Temperature Pulse Respirations Blood Pressure SpO2   07/14/18 1903 07/14/18 1903 07/14/18 1903 07/14/18 1906 07/14/18 1903   98 1 °F (36 7 °C) 74 22 (!) 214/93 98 %      Temp Source Heart Rate Source Patient Position - Orthostatic VS BP Location FiO2 (%)   07/14/18 1903 07/14/18 1903 07/14/18 1903 07/14/18 1903 --   Oral Monitor Lying Right arm       Pain Score       07/14/18 1903       8           Vitals:    07/14/18 2017 07/14/18 2020 07/14/18 2021 07/14/18 2028   BP:  (!) 186/108  (!) 209/100   Pulse: 72  70 83   Patient Position - Orthostatic VS:    Sitting       Visual Acuity  Visual Acuity      Most Recent Value   L Pupil Size (mm)  3   R Pupil Size (mm)  3          ED Medications  Medications    EMS REPLENISHMENT MED ( Does not apply Given to EMS 7/14/18 1914)   propofol (DIPRIVAN) 200 MG/20ML bolus injection 400 mg (400 mg Intravenous Given 7/14/18 1940)       Diagnostic Studies  Results Reviewed     None                 XR hip/pelv 2-3 vws left if performed   ED Interpretation by Mulugeta Erickson DO (07/14 2009)   Successful relocation of previously dislocated hip      XR hip/pelv 2-3 vws left   ED Interpretation by Mulugeta Erickson DO (07/14 1930)   Superior dislocation      Final Result by Shira Keith MD (07/14 1937)      Superolateral dislocation of the left hip prosthesis  No fracture  Workstation performed: [id number]                    Procedures  Orthopedic Injury  Date/Time: 7/14/2018 7:41 PM  Performed by: Yane Newman by: Clovis Cho     Patient Location:  ED  Written consent obtained?: Yes    Risks and benefits: Risks, benefits and alternatives were discussed    Patient identity confirmed:  Verbally with patient and hospital-assigned identification number  Injury location:  Hip  Location details:  Left hip  Injury type:  Dislocation  Dislocation type: anterior    Spontaneous?: Yes (Hip dislocation was more of a superior dislocation than a true anterior dislocation)    Prosthesis?: Yes    Neurovascular status: Neurovascularly intact    Distal perfusion: normal    Neurological function: normal    Range of motion: reduced    Sedation type:   Moderate (conscious) sedation (See separate Procedural Sedation form)  Manipulation performed?: Yes    Reduction method:  Traction and counter traction  Reduction method:  Traction and counter traction  Reduction method:  Traction and counter traction  Reduction method:  Traction and counter traction  Reduction method:  Traction and counter traction  Reduction method:  Traction and counter traction  Skeletal traction used?: Yes    Reduction successful?: Yes    Confirmation: Reduction confirmed by x-ray    Neurovascular status: Neurovascularly intact    Distal perfusion: normal    Neurological function: normal    Range of motion: normal    Patient tolerance:  Patient tolerated the procedure well with no immediate complications  Procedural Sedation  Date/Time: 2018 7:38 PM  Performed by: Sveta Schmitz  Authorized by: Sveta Schmitz     Consent:     Consent obtained:  Written    Consent given by:  Patient  Universal protocol:     Patient identity confirmation method:  Verbally with patient and hospital-assigned identification number  Indications:     Sedation purpose:  Dislocation reduction    Procedure necessitating sedation performed by:  Physician performing sedation    Intended level of sedation:  Moderate (conscious sedation)  Pre-sedation assessment:     Time since last food or drink:  2 hours    NPO status caution: urgency dictates proceeding with non-ideal NPO status      ASA classification: class 2 - patient with mild systemic disease      Neck mobility: normal      Mouth openin finger widths    Thyromental distance:  4 finger widths    Mallampati score:  III - soft palate, base of uvula visible    Pre-sedation assessments completed and reviewed: airway patency, cardiovascular function, hydration status, mental status, nausea/vomiting, pain level, respiratory function and temperature      History of difficult intubation: no      Pre-sedation assessment completed:  2018 7:33 PM  Immediate pre-procedure details:     Reassessment: Patient reassessed immediately prior to procedure      Reviewed: vital signs      Verified: bag valve mask available    Procedure details (see MAR for exact dosages):     Sedation start time:  7/14/2018 7:38 PM    Preoxygenation:  Nasal cannula    Sedation:  Propofol    Analgesia:  Fentanyl    Intra-procedure monitoring:  Blood pressure monitoring, cardiac monitor, continuous pulse oximetry, continuous capnometry, frequent LOC assessments and frequent vital sign checks    Intra-procedure events: none      Sedation end time:  7/14/2018 8:06 PM  Post-procedure details:     Post-sedation assessment completed:  7/14/2018 8:06 PM    Attendance: Constant attendance by certified staff until patient recovered      Recovery: Patient returned to pre-procedure baseline      Post-sedation assessments completed and reviewed: airway patency, cardiovascular function, hydration status, mental status, nausea/vomiting, pain level, respiratory function and temperature      Patient is stable for discharge or admission: yes      Patient tolerance: Tolerated well, no immediate complications      Conscious Sedation Assessment      Classification Score   ASA Scale Assessment  2-Mild to moderate systemic disease, medically well controlled, with no functional limitation filed at 07/14/2018 1935           Phone Contacts  ED Phone Contact    ED Course                               MDM  Number of Diagnoses or Management Options  Anterior dislocation of left hip, initial encounter [facility]): new and requires workup  Diagnosis management comments:       Initial ED assessment:  77-year-old male, recurrent hip dislocation ,         Initial ED plan:   will provide procedural sedation to attempt to reduce  Final ED summary/disposition:   After evaluation and workup in the emergency department, hip successfully relocated  , patient fully awake from anesthesia  , patient discharged       Amount and/or Complexity of Data Reviewed  Tests in the radiology section of CPT®: ordered and reviewed  Decide to obtain previous medical records or to obtain history from someone other than the patient: yes  Obtain history from someone other than the patient: yes  Review and summarize past medical records: yes  Independent visualization of images, tracings, or specimens: yes      CritCare Time    Disposition  Final diagnoses:   Anterior dislocation of left hip, initial encounter [facility])     Time reflects when diagnosis was documented in both MDM as applicable and the Disposition within this note     Time User Action Codes Description Comment    7/14/2018  8:06 PM Norm Jillian Add [S73 [id number]] Anterior dislocation of left hip, initial encounter [facility])       ED Disposition     ED Disposition Condition Comment    Discharge  Junnie Grave discharge to home/self care  Condition at discharge: Good        Follow-up Information     Follow up With Specialties Details Why [address] [facility] Call in 2 days To arrange for the next available appointment [address] [place] [address]  [phone]          Discharge Medication List as of 7/14/2018  8:07 PM      CONTINUE these medications which have NOT CHANGED    Details   aspirin (ECOTRIN LOW STRENGTH) 81 mg EC tablet Take 81 mg by mouth daily, Historical Med      losartan-hydrochlorothiazide (HYZAAR) 100-12 5 MG per tablet Take 1 tablet by mouth daily, Starting u 5/24/2018, Normal      Multiple Vitamin (MULTIVITAMIN) tablet Take 1 tablet by mouth daily, Historical Med           No discharge procedures on file      ED Provider  Electronically Signed by           Pop Van DO  07/14/18 2007       Pop Van DO  07/14/18 9483

## 2018-07-15 NOTE — ED NOTES
Patient awake, alert and in stable condition  Offers no further c/o's  Reviewed discharge instructions, f/u information, and provided education on hip precautions  All questions answered at bedside  Ambulated off unit with steady gait in no acute distress          Alexis Holland RN  07/14/18 5298

## 2018-07-15 NOTE — DISCHARGE INSTRUCTIONS
Hip Dislocation   WHAT YOU NEED TO KNOW:   A hip dislocation occurs when your thigh bone is forced out of your hip socket  DISCHARGE INSTRUCTIONS:   Medicines: You may need any of the following:  · Pain medicine  helps decrease or take away your pain  Do not wait until the pain is severe before you take this medicine  · Take your medicine as directed  Contact your healthcare provider if you think your medicine is not helping or if you have side effects  Tell him of her if you are allergic to any medicine  Keep a list of the medicines, vitamins, and herbs you take  Include the amounts, and when and why you take them  Bring the list or the pill bottles to follow-up visits  Carry your medicine list with you in case of an emergency  Follow up with your healthcare provider or orthopedist as directed: You may need another x-ray or CT scan  Write down your questions so you remember to ask them during your visits  Self-care: It will take 2 to 3 months for your hip to heal   · Use a walker or crutches as directed  Ask your healthcare provider or orthopedist when you can put weight on your injured side  As your hip heals, use a cane to help you walk until your limp goes away  · Avoid high-impact activities and sports  for 6 to 12 weeks or until your hip strength has returned  Physical therapy:  Go to physical therapy as directed  A physical therapist teaches you exercises to increase the range of motion in your hip  Exercises also make your hip stronger and decrease pain  Prevent another hip dislocation:  Follow these precautions for 6 weeks after your injury or as directed:  · Sit with your back straight and your feet flat on the floor  Do not cross your legs  Do not lean forward when you sit in a chair  · Keep your knees apart  Place a pillow or wedge between your knees when you sit or lie down  Do not twist your knees  Do not lift your knees higher than your hips  · Do not sit in a low chair  Use armrests and your upper body strength to push yourself up from a sitting position  · Do not bend at the waist to  an object from the floor  Bend your knees to reach the object, or use a tool to pick it up  Wound care: If you had surgery, ask how to care for your wound  Carefully wash the wound with soap and water  Dry the area and put on new, clean bandages as directed  Change your bandages when they get wet or dirty  Contact your healthcare provider if:   · You have a fever  · Your incision is red, swollen, or draining pus  · You have numbness in your leg or foot  · You have pain that does not go away after you take pain medicine  · You cannot walk well with your cane or crutches  · You have questions or concerns about your condition or care  Return to the emergency department if:   · You have severe pain  · You dislocate your hip again  · Your incision comes apart, or blood soaks through your bandage  © 2017 [facility] [facility] Information is for End User's use only and may not be sold, redistributed or otherwise used for commercial purposes  All illustrations and images included in CareNotes® are the copyrighted property of [employer] M [employer]  or [employer]  The above information is an  only  It is not intended as medical advice for individual conditions or treatments  Talk to your doctor, nurse or pharmacist before following any medical regimen to see if it is safe and effective for you  Procedural Sedation   WHAT YOU NEED TO KNOW:   Procedural sedation is medicine used during procedures to help you feel relaxed and calm  You will remember little to none of the procedure  After sedation you may feel tired, weak, or unsteady on your feet  You may also have trouble concentrating or short-term memory loss  These symptoms should go away in 24 hours or less     DISCHARGE INSTRUCTIONS:   Call 911 or have someone else call for any of the following:   · You have sudden trouble breathing  · You cannot be woken  Return to the emergency department if:   · You have a severe headache or dizziness  · Your heart is beating faster than usual   Contact your healthcare provider if:   · You have a fever or chills  · Your skin is itchy, swollen, or you have a rash  · You have nausea or are vomiting for more than 8 hours after the procedure  · You have questions or concerns about your condition or care  Self-care:   · Have someone stay with you for 24 hours  This person can drive you to errands and help you do things around the house  This person can also watch for problems  · Rest and do quiet activities for 24 hours  Do not exercise, ride a bike, or play sports  Stand up slowly to prevent dizziness and falls  Take short walks around the house with another person  Slowly return to your usual activities the next day  · Do not drive or use dangerous machines or tools for 24 hours  You may injure yourself or others  Examples include a lawnmower, saw, or drill  Do not return to work for 24 hours if you use dangerous machines or tools for work  · Do not make important decisions for 24 hours  For example, do not sign important papers or invest money  · Drink liquids as directed  Liquids help flush the sedation medicine out of your body  Ask how much liquid to drink each day and which liquids are best for you  · Eat small, frequent meals to prevent nausea and vomiting  Start with clear liquids such as juice or broth  If you do not vomit after clear liquids, you can eat your usual foods  · Do not drink alcohol or take medicines that make you drowsy  This includes medicines that help you sleep and anxiety medicines  Ask your healthcare provider if it is safe for you to take pain medicine  Follow up with your healthcare provider as directed:  Write down your questions so you remember to ask them during your visits  © 2017 [address] [facility] Information is for End User's use only and may not be sold, redistributed or otherwise used for commercial purposes  All illustrations and images included in CareNotes® are the copyrighted property of A D A M , Inc  or [employer]vin Young  The above information is an  only  It is not intended as medical advice for individual conditions or treatments  Talk to your doctor, nurse or pharmacist before following any medical regimen to see if it is safe and effective for you  Moderate Sedation   WHAT YOU NEED TO KNOW:   Moderate sedation, or conscious sedation, is medicine used during procedures to help you feel relaxed and calm  You will be awake and able to follow directions without anxiety or pain  You will remember little to none of the procedure  You may feel tired, weak, or unsteady on your feet after you get sedation  You may also have trouble concentrating or short-term memory loss  These symptoms should go away in 24 hours or less  DISCHARGE INSTRUCTIONS:   Call 911 or have someone else call for any of the following:   · You have sudden trouble breathing  · You cannot be woken  Seek care immediately if:   · You have a severe headache or dizziness  · Your heart is beating faster than usual   Contact your healthcare provider if:   · You have a fever  · You have nausea or are vomiting for more than 8 hours after the procedure  · Your skin is itchy, swollen, or you have a rash  · You have questions or concerns about your condition or care  Self-care:   · Have someone stay with you for 24 hours  This person can drive you to errands and help you do things around the house  This person can also watch for problems  · Rest and do quiet activities for 24 hours  Do not exercise, ride a bike, or play sports  Stand up slowly to prevent dizziness and falls  Take short walks around the house with another person   Slowly return to your usual activities the next day  · Do not drive or use dangerous machines or tools for 24 hours  You may injure yourself or others  Examples include a lawnmower, saw, or drill  Do not return to work for 24 hours if you use dangerous machines or tools for work  · Do not make important decisions for 24 hours  For example, do not sign important papers or invest money  · Drink liquids as directed  Liquids help flush the sedation medicine out of your body  Ask how much liquid to drink each day and which liquids are best for you  · Eat small, frequent meals to prevent nausea and vomiting  Start with clear liquids such as juice or broth  If you do not vomit after clear liquids, you can eat your usual foods  · Do not drink alcohol or take medicines that make you drowsy  This includes medicines that help you sleep and anxiety medicines  Ask your healthcare provider if it is safe for you to take pain medicine  Follow up with your healthcare provider as directed:  Write down your questions so you remember to ask them during your visits  © 2017 [address] [facility] Information is for End User's use only and may not be sold, redistributed or otherwise used for commercial purposes  All illustrations and images included in CareNotes® are the copyrighted property of [employer]  or Alvin Young  The above information is an  only  It is not intended as medical advice for individual conditions or treatments  Talk to your doctor, nurse or pharmacist before following any medical regimen to see if it is safe and effective for you

## 2018-07-15 NOTE — ED NOTES
Ambulated patient in hallway and tolerated well  Ambulated 25+ feet unassisted and without difficulty  Denied any dizziness, HA, pain             Major CARLA Marx  07/14/18 2100

## 2018-07-16 ENCOUNTER — TELEPHONE (OUTPATIENT)
Dept: FAMILY MEDICINE CLINIC | Facility: CLINIC | Age: 73
End: 2018-07-16

## 2018-07-17 NOTE — TELEPHONE ENCOUNTER
I wrote Rx for L hip brace  Recommend orthopedic surgeon evaluation if continues with L  hip problem

## 2018-07-17 NOTE — TELEPHONE ENCOUNTER
Patient states he needs a hip brace that would keep hip from coming out and pulled in  He has appointment scheduled for fitting on Thursday 07/19/18

## 2018-08-27 ENCOUNTER — APPOINTMENT (OUTPATIENT)
Dept: LAB | Facility: MEDICAL CENTER | Age: 73
End: 2018-08-27
Payer: MEDICARE

## 2018-08-27 DIAGNOSIS — I10 ESSENTIAL HYPERTENSION: ICD-10-CM

## 2018-08-27 LAB
ALBUMIN SERPL BCP-MCNC: 3.7 G/DL (ref 3.5–5)
ALP SERPL-CCNC: 91 U/L (ref 46–116)
ALT SERPL W P-5'-P-CCNC: 17 U/L (ref 12–78)
ANION GAP SERPL CALCULATED.3IONS-SCNC: 8 MMOL/L (ref 4–13)
AST SERPL W P-5'-P-CCNC: 17 U/L (ref 5–45)
BASOPHILS # BLD AUTO: 0.07 THOUSANDS/ΜL (ref 0–0.1)
BASOPHILS NFR BLD AUTO: 1 % (ref 0–1)
BILIRUB SERPL-MCNC: 0.74 MG/DL (ref 0.2–1)
BUN SERPL-MCNC: 20 MG/DL (ref 5–25)
CALCIUM SERPL-MCNC: 9.3 MG/DL (ref 8.3–10.1)
CHLORIDE SERPL-SCNC: 105 MMOL/L (ref 100–108)
CHOLEST SERPL-MCNC: 206 MG/DL (ref 50–200)
CO2 SERPL-SCNC: 26 MMOL/L (ref 21–32)
CREAT SERPL-MCNC: 0.89 MG/DL (ref 0.6–1.3)
EOSINOPHIL # BLD AUTO: 0.21 THOUSAND/ΜL (ref 0–0.61)
EOSINOPHIL NFR BLD AUTO: 3 % (ref 0–6)
ERYTHROCYTE [DISTWIDTH] IN BLOOD BY AUTOMATED COUNT: 15.4 % (ref 11.6–15.1)
GFR SERPL CREATININE-BSD FRML MDRD: 85 ML/MIN/1.73SQ M
GLUCOSE P FAST SERPL-MCNC: 124 MG/DL (ref 65–99)
HCT VFR BLD AUTO: 40.6 % (ref 36.5–49.3)
HDLC SERPL-MCNC: 51 MG/DL (ref 40–60)
HGB BLD-MCNC: 12.7 G/DL (ref 12–17)
IMM GRANULOCYTES # BLD AUTO: 0.02 THOUSAND/UL (ref 0–0.2)
IMM GRANULOCYTES NFR BLD AUTO: 0 % (ref 0–2)
LDLC SERPL CALC-MCNC: 139 MG/DL (ref 0–100)
LYMPHOCYTES # BLD AUTO: 1.45 THOUSANDS/ΜL (ref 0.6–4.47)
LYMPHOCYTES NFR BLD AUTO: 20 % (ref 14–44)
MCH RBC QN AUTO: 27.3 PG (ref 26.8–34.3)
MCHC RBC AUTO-ENTMCNC: 31.3 G/DL (ref 31.4–37.4)
MCV RBC AUTO: 87 FL (ref 82–98)
MONOCYTES # BLD AUTO: 0.58 THOUSAND/ΜL (ref 0.17–1.22)
MONOCYTES NFR BLD AUTO: 8 % (ref 4–12)
NEUTROPHILS # BLD AUTO: 4.79 THOUSANDS/ΜL (ref 1.85–7.62)
NEUTS SEG NFR BLD AUTO: 68 % (ref 43–75)
NONHDLC SERPL-MCNC: 155 MG/DL
NRBC BLD AUTO-RTO: 0 /100 WBCS
PLATELET # BLD AUTO: 214 THOUSANDS/UL (ref 149–390)
PMV BLD AUTO: 11.1 FL (ref 8.9–12.7)
POTASSIUM SERPL-SCNC: 4.2 MMOL/L (ref 3.5–5.3)
PROT SERPL-MCNC: 7.5 G/DL (ref 6.4–8.2)
RBC # BLD AUTO: 4.66 MILLION/UL (ref 3.88–5.62)
SODIUM SERPL-SCNC: 139 MMOL/L (ref 136–145)
TRIGL SERPL-MCNC: 82 MG/DL
TSH SERPL DL<=0.05 MIU/L-ACNC: 2.14 UIU/ML (ref 0.36–3.74)
WBC # BLD AUTO: 7.12 THOUSAND/UL (ref 4.31–10.16)

## 2018-08-27 PROCEDURE — 84443 ASSAY THYROID STIM HORMONE: CPT

## 2018-08-27 PROCEDURE — 85025 COMPLETE CBC W/AUTO DIFF WBC: CPT

## 2018-08-27 PROCEDURE — 80061 LIPID PANEL: CPT

## 2018-08-27 PROCEDURE — 80053 COMPREHEN METABOLIC PANEL: CPT

## 2018-08-27 PROCEDURE — 36415 COLL VENOUS BLD VENIPUNCTURE: CPT

## 2018-10-13 PROBLEM — R73.01 IMPAIRED FASTING GLUCOSE: Status: ACTIVE | Noted: 2018-10-13

## 2018-10-15 ENCOUNTER — OFFICE VISIT (OUTPATIENT)
Dept: FAMILY MEDICINE CLINIC | Facility: CLINIC | Age: 73
End: 2018-10-15
Payer: MEDICARE

## 2018-10-15 VITALS
OXYGEN SATURATION: 96 % | WEIGHT: 226.6 LBS | DIASTOLIC BLOOD PRESSURE: 84 MMHG | SYSTOLIC BLOOD PRESSURE: 144 MMHG | HEART RATE: 74 BPM | BODY MASS INDEX: 37.75 KG/M2 | HEIGHT: 65 IN | RESPIRATION RATE: 16 BRPM | TEMPERATURE: 97.7 F

## 2018-10-15 DIAGNOSIS — J30.1 SEASONAL ALLERGIC RHINITIS DUE TO POLLEN: ICD-10-CM

## 2018-10-15 DIAGNOSIS — R73.01 IMPAIRED FASTING GLUCOSE: ICD-10-CM

## 2018-10-15 DIAGNOSIS — E66.9 OBESITY (BMI 35.0-39.9 WITHOUT COMORBIDITY): ICD-10-CM

## 2018-10-15 DIAGNOSIS — I10 ESSENTIAL HYPERTENSION: Primary | ICD-10-CM

## 2018-10-15 DIAGNOSIS — Z12.5 SCREENING FOR PROSTATE CANCER: ICD-10-CM

## 2018-10-15 DIAGNOSIS — N20.0 NEPHROLITHIASIS: ICD-10-CM

## 2018-10-15 DIAGNOSIS — Z00.00 MEDICARE ANNUAL WELLNESS VISIT, INITIAL: ICD-10-CM

## 2018-10-15 PROBLEM — J30.2 SEASONAL ALLERGIC RHINITIS: Status: ACTIVE | Noted: 2018-10-15

## 2018-10-15 PROCEDURE — G0438 PPPS, INITIAL VISIT: HCPCS | Performed by: FAMILY MEDICINE

## 2018-10-15 PROCEDURE — 99214 OFFICE O/P EST MOD 30 MIN: CPT | Performed by: FAMILY MEDICINE

## 2018-10-15 RX ORDER — MONTELUKAST SODIUM 10 MG/1
10 TABLET ORAL
Qty: 30 TABLET | Refills: 5 | Status: SHIPPED | OUTPATIENT
Start: 2018-10-15 | End: 2019-04-19 | Stop reason: SDUPTHER

## 2018-10-15 RX ORDER — AMOXICILLIN 500 MG/1
CAPSULE ORAL
COMMUNITY
Start: 2018-10-11

## 2018-10-15 NOTE — PROGRESS NOTES
Chief Complaint   Patient presents with    Medicare Wellness Visit     AWV    Follow-up     4 month follow up     Assessment and Plan:    Problem List Items Addressed This Visit        Endocrine    Impaired fasting glucose    Relevant Orders    Hemoglobin A1C       Cardiovascular and Mediastinum    Essential hypertension - Primary    Relevant Orders    Comprehensive metabolic panel    Lipid panel       Genitourinary    Nephrolithiasis       Other    Obesity (BMI 35 0-39 9 without comorbidity)      Other Visit Diagnoses     Screening for prostate cancer        Relevant Orders    PSA, Total Screen        Health Maintenance Due   Topic Date Due    Medicare Annual Wellness Visit (AWV)  1945    CRC Screening: Colonoscopy  1945    Pneumococcal PPSV23/PCV13 65+ Years / Low and Medium Risk (1 of 2 - PCV13) 02/17/2010    INFLUENZA VACCINE  07/01/2018         HPI:  Krys Camargo is a 68 y o  male here for his Initial Wellness Visit      Patient Active Problem List   Diagnosis    Lumbar spondylosis    Essential hypertension    History of total left hip arthroplasty    Obesity (BMI 35 0-39 9 without comorbidity)    Nephrolithiasis    Impaired fasting glucose    Medicare annual wellness visit, initial     Past Medical History:   Diagnosis Date    Hypertension     Kidney stone     Obesity      Past Surgical History:   Procedure Laterality Date    CHOLECYSTECTOMY      JOINT REPLACEMENT      LEG SURGERY       Family History   Problem Relation Age of Onset    Coronary artery disease Mother     Heart failure Mother     Coronary artery disease Father      History   Smoking Status    Never Smoker   Smokeless Tobacco    Never Used     History   Alcohol Use No      History   Drug Use No       Current Outpatient Prescriptions   Medication Sig Dispense Refill    amoxicillin (AMOXIL) 500 mg capsule       aspirin (ECOTRIN LOW STRENGTH) 81 mg EC tablet Take 81 mg by mouth daily      losartan-hydrochlorothiazide (HYZAAR) 100-12 5 MG per tablet Take 1 tablet by mouth daily 30 tablet 6    Multiple Vitamin (MULTIVITAMIN) tablet Take 1 tablet by mouth daily       No current facility-administered medications for this visit  Allergies   Allergen Reactions    Epinephrine     Lidocaine      w/epi    Procaine      Immunization History   Administered Date(s) Administered    Tdap 12/03/2013       Patient Care Team:  Armani Hammond MD as PCP - General (Family Medicine)    Medicare Screening Tests and Risk Assessments:  Naa Rust is here for his Initial Wellness visit  Health Risk Assessment:  Patient rates overall health as very good  Patient feels that their physical health rating is Slightly worse  Eyesight was rated as Same  Hearing was rated as Same  Patient feels that their emotional and mental health rating is Slightly better  Pain experienced by patient in the last 7 days has been None  Emotional/Mental Health:  Patient has been feeling nervous/anxious  PHQ-9 Depression Screening:    Frequency of the following problems over the past two weeks:      1  Little interest or pleasure in doing things: 0 - not at all      2  Feeling down, depressed, or hopeless: 0 - not at all  PHQ-2 Score: 0          Broken Bones/Falls: Fall Risk Assessment:    In the past year, patient has experienced: No history of falling in past year          Bladder/Bowel:  Patient has leaked urine accidently in the last six months  Patient reports no loss of bowel control  Immunizations:  Patient has not had a flu vaccination within the last year  Patient has not received a pneumonia shot  Patient has not received a shingles shot  Patient has not received tetanus/diphtheria shot  Home Safety:  Patient does not have trouble with stairs inside or outside of their home     Patient currently reports that there are no safety hazards present in home, working smoke alarms, no working carbon monoxide detectors  Preventative Screenings:   no prostate cancer screen performed, colon cancer screen completed, cholesterol screen completed, glaucoma eye exam completed,     Nutrition:  Current diet: Regular, No Added Salt and Limited junk food with servings of the following:    Medications:  Patient is currently taking over-the-counter supplements  Patient is able to manage medications  Lifestyle Choices:  Patient reports no tobacco use  Patient has not smoked or used tobacco in the past   Patient reports alcohol use  Alcohol use per week: 5  Patient drives a vehicle  Patient wears seat belt  Current level of exercise of physical activity described by patient as: Not as much as I would like yet  Activities of Daily Living:  Can get out of bed by his or her self, able to dress self, able to make own meals, able to do own shopping, able to bathe self, can do own laundry/housekeeping, can manage own money, pay bills and track expenses    Previous Hospitalizations:  Hospitalization or ED visit in past 12 months  Number of hospitalizations within the last year: 3-4        Advanced Directives:  Patient has decided on a power of   Patient has spoken to designated power of   Patient has completed advanced directive          Preventative Screening/Counseling:      Cardiovascular:      General: Risks and Benefits Discussed and Screening Current      Counseling: Healthy Diet, Healthy Weight, Improve Cholesterol, Improve Blood Pressure and Improve Exercise Tolerance          Diabetes:      General: Risks and Benefits Discussed and Screening Current      Counseling: Healthy Diet, Healthy Weight and Improve Physical Activity          Colorectal Cancer:      General: Risks and Benefits Discussed and Screening Current      Counseling: high fiber diet      Comments: Colonoscopy done 4 years ago        Prostate Cancer:      General: Risks and Benefits Discussed      Due for labs: PSA Osteoporosis:      General: Risks and Benefits Discussed and Screening Not Indicated      Counseling: Calcium and Vitamin D Intake and Regular Weightbearing Exercise          AAA:      General: Risks and Benefits Discussed and Screening Not Indicated          Glaucoma:      General: Risks and Benefits Discussed and Screening Current          HIV:      General: Screening Not Indicated          Hepatitis C:      General: Risks and Benefits Discussed        Advanced Directives:   Patient has living will for healthcare, has durable POA for healthcare, patient has an advanced directive  Information on ACP and/or AD provided  End of life assessment reviewed with patient  Immunizations:      Influenza: Risks & Benefits Discussed and Patient Declines      Pneumococcal: Risks & Benefits Discussed and Patient Declines      Shingrix: Patient Declines and Risks & Benefits Discussed      Hepatitis B (Low risk patients): Series Not Indicated      TDAP: Risks & Benefits Discussed and Patient Declines      Other Preventative Counseling (Non-Medicare):   Fall Prevention, Increase physical activity, Nutrition Counseling, Car/seat belt/driving safety reviewed and Skin self-exam      MMSE: 26/30

## 2018-10-16 NOTE — PROGRESS NOTES
Assessment/Plan:    Essential hypertension  Continue Losartan/HCTZ 100/12 5 mg daily  Follow a low-sodium diet  Obesity (BMI 35 0-39 9 without comorbidity)  Encouraged regular exercise, weight reduction  Nephrolithiasis  Patient is asymptomatic  He was previously seen by urologist in [place]  Advised to stay well hydrated  Impaired fasting glucose  Discussed a low carb diet with patient  Will check Hemoglobin A1c with next blood work  Seasonal allergic rhinitis  Will start Singular 10 mg at bedtime  Patient wants to hold off on using nasal steroid sprays  Recommended to call with update on symptoms in 2 weeks  HM:  patient declined Flu vaccination, Pneumovax  Discussed a new vaccine for shingles  Patient will check with insurance regarding coverage  Colonoscopy done at age 71  Check PSA  I have spent 25 minutes with Patient  today in which greater than 50% of this time was spent in counseling/coordination of care regarding Diagnostic results, Risks and benefits of tx options, Intructions for management, Patient and family education, Importance of tx compliance and Risk factor reductions , counseled regarding healthy diet, weight reduction, exercise  Schedule follow-up office visit in 6 months  Check labs prior to next visit  Diagnoses and all orders for this visit:    Essential hypertension  -     Comprehensive metabolic panel; Future  -     Lipid panel; Future    Obesity (BMI 35 0-39 9 without comorbidity)    Nephrolithiasis    Impaired fasting glucose  -     Hemoglobin A1C; Future    Screening for prostate cancer  -     PSA, Total Screen; Future    Medicare annual wellness visit, initial    Seasonal allergic rhinitis due to pollen  -     montelukast (SINGULAIR) 10 mg tablet; Take 1 tablet (10 mg total) by mouth daily at bedtime    Other orders  -     amoxicillin (AMOXIL) 500 mg capsule;           Subjective:      Patient ID: Alfredo Mckenzie is a 68 y o  male      HPI Patient is 70-year-old male with H/o HTN, Obesity, kidney stones, OA, S/P L  total hip arthroplasty in 2 /18,  L hip dislocations  Presents for 4 month follow-up office visit  Reviewed current medications,  blood work results from August 2018  Fasting blood sugar 124, creatinine 0 89, TSH 2 140, potassium 4 2, cholesterol 206, triglycerides 82, HDL 51,   HTN - patient takes Losartan/HCTZ 100/12 5 mg daily  He checks blood pressure at home periodically  BP ranges in 140-150 /80's  Denies chest pain, shortness of breath, dizziness  Obesity -weight has been stable  Patient tries to follow a low fat diet  He avoid sweets and starches  Patient c/o seasonal allergies  C/o postnasal drip, mucus in the back of his throat  He tried antihistamines with no relief in symptoms  Colonoscopy done at age 71  Nephrolithiasis -patient denies abdominal pain, back pain, blood in urine  Refusing Flu shot and Pneumovax  Patient lives in [place] from November till May  The following portions of the patient's history were reviewed and updated as appropriate: current medications, past family history, past medical history, past social history, past surgical history and problem list     Review of Systems   Constitutional: Negative for activity change, appetite change, chills, fatigue and fever  HENT: Negative for congestion, ear pain, hearing loss, nosebleeds, sore throat, tinnitus and trouble swallowing  Eyes: Negative for pain, discharge, redness, itching and visual disturbance  Respiratory: Negative for cough, chest tightness, shortness of breath and wheezing  Cardiovascular: Negative for chest pain, palpitations and leg swelling  Gastrointestinal: Negative for abdominal pain, blood in stool, constipation, diarrhea, nausea and vomiting  Genitourinary: Negative for difficulty urinating, dysuria, flank pain, frequency and hematuria          Nocturia x 1 Musculoskeletal: Positive for arthralgias (milsd occasional L hip pain )  Negative for back pain, joint swelling and myalgias  Skin: Negative for rash and wound  Neurological: Negative for dizziness, syncope and headaches  Hematological: Negative  Psychiatric/Behavioral: Negative  Objective:      /84 (BP Location: Left arm, Patient Position: Sitting, Cuff Size: Large)   Pulse 74   Temp 97 7 °F (36 5 °C) (Oral)   Resp 16   Ht 5' 4 5" (1 638 m)   Wt 103 kg (226 lb 9 6 oz)   SpO2 96%   BMI 38 30 kg/m²        Physical Exam   Constitutional: He appears well-developed and well-nourished  Obese   HENT:   Head: Normocephalic and atraumatic  Right Ear: External ear normal    Left Ear: External ear normal    Mouth/Throat: Oropharynx is clear and moist    Eyes: Pupils are equal, round, and reactive to light  Conjunctivae are normal    Neck: Normal range of motion  Neck supple  No JVD present  Cardiovascular: Normal rate, regular rhythm and normal heart sounds  No murmur heard  No carotid bruits BL  No BL LE edema   Pulmonary/Chest: Effort normal and breath sounds normal  He has no wheezes  He has no rales  Abdominal: Soft  Bowel sounds are normal  There is no tenderness  Musculoskeletal: He exhibits no edema or tenderness  Patient walks with a slight limp   Skin: Skin is warm and dry  No rash noted  Psychiatric: He has a normal mood and affect  Nursing note and vitals reviewed

## 2018-10-16 NOTE — ASSESSMENT & PLAN NOTE
Patient is asymptomatic  He was previously seen by urologist in [place]  Advised to stay well hydrated

## 2018-10-16 NOTE — ASSESSMENT & PLAN NOTE
Will start Singular 10 mg at bedtime  Patient wants to hold off on using nasal steroid sprays  Recommended to call with update on symptoms in 2 weeks

## 2019-04-08 ENCOUNTER — LAB (OUTPATIENT)
Dept: LAB | Facility: MEDICAL CENTER | Age: 74
End: 2019-04-08
Payer: MEDICARE

## 2019-04-08 DIAGNOSIS — Z12.5 SCREENING FOR PROSTATE CANCER: ICD-10-CM

## 2019-04-08 DIAGNOSIS — R73.01 IMPAIRED FASTING GLUCOSE: ICD-10-CM

## 2019-04-08 DIAGNOSIS — I10 ESSENTIAL HYPERTENSION: ICD-10-CM

## 2019-04-08 LAB
ALBUMIN SERPL BCP-MCNC: 3.8 G/DL (ref 3.5–5)
ALP SERPL-CCNC: 104 U/L (ref 46–116)
ALT SERPL W P-5'-P-CCNC: 16 U/L (ref 12–78)
ANION GAP SERPL CALCULATED.3IONS-SCNC: 5 MMOL/L (ref 4–13)
AST SERPL W P-5'-P-CCNC: 14 U/L (ref 5–45)
BILIRUB SERPL-MCNC: 0.46 MG/DL (ref 0.2–1)
BUN SERPL-MCNC: 18 MG/DL (ref 5–25)
CALCIUM SERPL-MCNC: 8.7 MG/DL (ref 8.3–10.1)
CHLORIDE SERPL-SCNC: 106 MMOL/L (ref 100–108)
CHOLEST SERPL-MCNC: 192 MG/DL (ref 50–200)
CO2 SERPL-SCNC: 26 MMOL/L (ref 21–32)
CREAT SERPL-MCNC: 1.02 MG/DL (ref 0.6–1.3)
EST. AVERAGE GLUCOSE BLD GHB EST-MCNC: 140 MG/DL
GFR SERPL CREATININE-BSD FRML MDRD: 72 ML/MIN/1.73SQ M
GLUCOSE P FAST SERPL-MCNC: 142 MG/DL (ref 65–99)
HBA1C MFR BLD: 6.5 % (ref 4.2–6.3)
HDLC SERPL-MCNC: 49 MG/DL (ref 40–60)
LDLC SERPL CALC-MCNC: 127 MG/DL (ref 0–100)
NONHDLC SERPL-MCNC: 143 MG/DL
POTASSIUM SERPL-SCNC: 4 MMOL/L (ref 3.5–5.3)
PROT SERPL-MCNC: 7.4 G/DL (ref 6.4–8.2)
PSA SERPL-MCNC: 3.6 NG/ML (ref 0–4)
SODIUM SERPL-SCNC: 137 MMOL/L (ref 136–145)
TRIGL SERPL-MCNC: 79 MG/DL

## 2019-04-08 PROCEDURE — 80061 LIPID PANEL: CPT

## 2019-04-08 PROCEDURE — G0103 PSA SCREENING: HCPCS

## 2019-04-08 PROCEDURE — 80053 COMPREHEN METABOLIC PANEL: CPT

## 2019-04-08 PROCEDURE — 36415 COLL VENOUS BLD VENIPUNCTURE: CPT

## 2019-04-08 PROCEDURE — 83036 HEMOGLOBIN GLYCOSYLATED A1C: CPT

## 2019-04-18 PROBLEM — E78.00 HYPERCHOLESTEREMIA: Status: ACTIVE | Noted: 2019-04-18

## 2019-04-19 ENCOUNTER — OFFICE VISIT (OUTPATIENT)
Dept: FAMILY MEDICINE CLINIC | Facility: CLINIC | Age: 74
End: 2019-04-19
Payer: MEDICARE

## 2019-04-19 VITALS
TEMPERATURE: 97.4 F | HEART RATE: 70 BPM | SYSTOLIC BLOOD PRESSURE: 144 MMHG | WEIGHT: 231.8 LBS | BODY MASS INDEX: 39.17 KG/M2 | RESPIRATION RATE: 16 BRPM | DIASTOLIC BLOOD PRESSURE: 84 MMHG | OXYGEN SATURATION: 97 %

## 2019-04-19 DIAGNOSIS — J30.1 SEASONAL ALLERGIC RHINITIS DUE TO POLLEN: ICD-10-CM

## 2019-04-19 DIAGNOSIS — I10 ESSENTIAL HYPERTENSION: ICD-10-CM

## 2019-04-19 DIAGNOSIS — N20.0 NEPHROLITHIASIS: ICD-10-CM

## 2019-04-19 DIAGNOSIS — Z12.11 SCREENING FOR COLON CANCER: ICD-10-CM

## 2019-04-19 DIAGNOSIS — E78.00 HYPERCHOLESTEREMIA: ICD-10-CM

## 2019-04-19 DIAGNOSIS — Z11.59 ENCOUNTER FOR HEPATITIS C VIRUS SCREENING TEST FOR HIGH RISK PATIENT: Primary | ICD-10-CM

## 2019-04-19 DIAGNOSIS — E66.9 OBESITY (BMI 35.0-39.9 WITHOUT COMORBIDITY): ICD-10-CM

## 2019-04-19 DIAGNOSIS — R73.01 IMPAIRED FASTING GLUCOSE: ICD-10-CM

## 2019-04-19 DIAGNOSIS — Z91.89 ENCOUNTER FOR HEPATITIS C VIRUS SCREENING TEST FOR HIGH RISK PATIENT: Primary | ICD-10-CM

## 2019-04-19 PROCEDURE — 99214 OFFICE O/P EST MOD 30 MIN: CPT | Performed by: FAMILY MEDICINE

## 2019-04-19 RX ORDER — LOSARTAN POTASSIUM AND HYDROCHLOROTHIAZIDE 12.5; 1 MG/1; MG/1
1 TABLET ORAL DAILY
Qty: 30 TABLET | Refills: 6 | Status: SHIPPED | OUTPATIENT
Start: 2019-04-19 | End: 2019-10-04 | Stop reason: SDUPTHER

## 2019-04-19 RX ORDER — MONTELUKAST SODIUM 10 MG/1
10 TABLET ORAL
Qty: 30 TABLET | Refills: 5 | Status: SHIPPED | OUTPATIENT
Start: 2019-04-19 | End: 2019-10-04 | Stop reason: SDUPTHER

## 2019-05-01 ENCOUNTER — TELEPHONE (OUTPATIENT)
Dept: FAMILY MEDICINE CLINIC | Facility: CLINIC | Age: 74
End: 2019-05-01

## 2019-07-12 LAB — HBA1C MFR BLD HPLC: 5.9 %

## 2019-10-03 DIAGNOSIS — J30.1 SEASONAL ALLERGIC RHINITIS DUE TO POLLEN: ICD-10-CM

## 2019-10-03 DIAGNOSIS — I10 ESSENTIAL HYPERTENSION: ICD-10-CM

## 2019-10-04 ENCOUNTER — TELEPHONE (OUTPATIENT)
Dept: FAMILY MEDICINE CLINIC | Facility: CLINIC | Age: 74
End: 2019-10-04

## 2019-10-04 RX ORDER — MONTELUKAST SODIUM 10 MG/1
10 TABLET ORAL
Qty: 30 TABLET | Refills: 5 | Status: SHIPPED | OUTPATIENT
Start: 2019-10-04 | End: 2020-09-25 | Stop reason: SDUPTHER

## 2019-10-04 RX ORDER — LOSARTAN POTASSIUM AND HYDROCHLOROTHIAZIDE 12.5; 1 MG/1; MG/1
1 TABLET ORAL DAILY
Qty: 30 TABLET | Refills: 6 | Status: SHIPPED | OUTPATIENT
Start: 2019-10-04 | End: 2020-09-25 | Stop reason: SDUPTHER

## 2019-10-04 NOTE — TELEPHONE ENCOUNTER
[facility] called to say they received scripts for Generic Singular 10 mg tablets and Generic Hyzaar 100-12 5 mg tablets and they need office notes why the patient needs theses medications  Dong Oates can be reached at [phone] and the fax number is [phone]

## 2019-10-04 NOTE — TELEPHONE ENCOUNTER
Patient requests refill on Losartan 100-12 5 mg 1  Daily and Montelukast 10 mg     Be sent to [facility])    Phone [phone]       I don't know if he will be following up here or with the [place]

## 2019-10-07 NOTE — TELEPHONE ENCOUNTER
Patient phoned to state the [address] [facility] did receive the prescription that was faxed, but the did not receive the office note-patient is asking you to please refax the office note  The fax number we have is correct

## 2019-10-21 ENCOUNTER — OFFICE VISIT (OUTPATIENT)
Dept: FAMILY MEDICINE CLINIC | Facility: CLINIC | Age: 74
End: 2019-10-21
Payer: OTHER GOVERNMENT

## 2019-10-21 VITALS
DIASTOLIC BLOOD PRESSURE: 90 MMHG | TEMPERATURE: 98.5 F | OXYGEN SATURATION: 100 % | SYSTOLIC BLOOD PRESSURE: 150 MMHG | BODY MASS INDEX: 38.02 KG/M2 | HEIGHT: 65 IN | WEIGHT: 228.2 LBS | HEART RATE: 64 BPM | RESPIRATION RATE: 16 BRPM

## 2019-10-21 DIAGNOSIS — R73.01 IMPAIRED FASTING GLUCOSE: ICD-10-CM

## 2019-10-21 DIAGNOSIS — B37.2 CANDIDAL DERMATITIS: ICD-10-CM

## 2019-10-21 DIAGNOSIS — E78.00 HYPERCHOLESTEREMIA: ICD-10-CM

## 2019-10-21 DIAGNOSIS — I10 ESSENTIAL HYPERTENSION: Primary | ICD-10-CM

## 2019-10-21 DIAGNOSIS — E66.9 OBESITY (BMI 35.0-39.9 WITHOUT COMORBIDITY): ICD-10-CM

## 2019-10-21 DIAGNOSIS — J30.1 SEASONAL ALLERGIC RHINITIS DUE TO POLLEN: ICD-10-CM

## 2019-10-21 DIAGNOSIS — E78.5 DYSLIPIDEMIA: ICD-10-CM

## 2019-10-21 PROCEDURE — 99214 OFFICE O/P EST MOD 30 MIN: CPT | Performed by: FAMILY MEDICINE

## 2019-10-21 RX ORDER — CLOTRIMAZOLE AND BETAMETHASONE DIPROPIONATE 10; .64 MG/G; MG/G
CREAM TOPICAL 2 TIMES DAILY
Qty: 30 G | Refills: 1 | Status: SHIPPED | OUTPATIENT
Start: 2019-10-21 | End: 2020-12-07

## 2019-10-21 NOTE — ASSESSMENT & PLAN NOTE
Continue Losartan/ HCTZ 100/12 5 daily  Follow a low-sodium diet, work on weight reduction  Monitor blood pressure at home, call if blood pressure >140/90

## 2019-10-21 NOTE — PROGRESS NOTES
Chief Complaint   Patient presents with    Follow-up     6 months  Health Maintenance   Topic Date Due    Hepatitis C Screening  1945    CRC Screening: Colonoscopy  1945    Pneumococcal Vaccine: 65+ Years (1 of 2 - PCV13) 02/17/2010    INFLUENZA VACCINE  07/01/2019    Fall Risk  10/15/2019    Depression Screening PHQ  10/15/2019    Medicare Annual Wellness Visit (AWV)  10/15/2019    BMI: Followup Plan  04/19/2020    BMI: Adult  04/19/2020    DTaP,Tdap,and Td Vaccines (2 - Td) 12/03/2023    Pneumococcal Vaccine: Pediatrics (0 to 5 Years) and At-Risk Patients (6 to 59 Years)  Aged Out    HEPATITIS B VACCINES  Aged Out     BMI Counseling: Body mass index is 38 57 kg/m²  The BMI is above normal  Nutrition recommendations include reducing portion sizes, decreasing overall calorie intake, 3-5 servings of fruits/vegetables daily, reducing fast food intake, consuming healthier snacks, decreasing soda and/or juice intake, moderation in carbohydrate intake, increasing intake of lean protein, reducing intake of saturated fat and trans fat and reducing intake of cholesterol  Exercise recommendations include moderate aerobic physical activity for 150 minutes/week  Assessment/Plan:    Essential hypertension  Continue Losartan/ HCTZ 100/12 5 daily  Follow a low-sodium diet, work on weight reduction  Monitor blood pressure at home, call if blood pressure >140/90  Impaired fasting glucose  Follow a low carb diet, avoid concentrated sweets  Obesity (BMI 35 0-39 9 without comorbidity)  Encouraged regular exercise, weight reduction  Dyslipidemia  Goal for HDL >  40  Continue to follow a low-cholesterol, low fat diet, regular exercise  Seasonal allergic rhinitis  Symptoms improved  Continue Singulair 10 mg at bedtime  Candidal dermatitis  Discussed skin hygiene with patient  Prescription sent to the pharmacy for Lotrisone cream to apply twice daily to groin area BL    Call office if rash persists or spreading  HM: patient refusing Pneumovax, Flu vaccination  Schedule follow-up office visit in 6 months  Check labs prior to next visit  Diagnoses and all orders for this visit:    Essential hypertension  -     Comprehensive metabolic panel; Future  -     TSH, 3rd generation with Free T4 reflex; Future    Impaired fasting glucose  -     Comprehensive metabolic panel; Future    Obesity (BMI 35 0-39 9 without comorbidity)    Hypercholesteremia  -     Comprehensive metabolic panel; Future  -     Lipid panel; Future    Seasonal allergic rhinitis due to pollen    Candidal dermatitis  -     clotrimazole-betamethasone (LOTRISONE) 1-0 05 % cream; Apply topically 2 (two) times a day    Dyslipidemia          Subjective:      Patient ID: Gurpreet Merida is a 76 y o  male  HPI     Patient is 68-year-old male with HTN, Obesity, kidney stones, OA, S/P L  total hip arthroplasty in 2 /18, L hip dislocation  Patient presents for routine 6 month follow-up office visit  Reviewed current medications, blood work report from July 2019  Blood test was done at [facility]  Hemoglobin A1c 5 9, cholesterol 140, triglycerides 35, HDL 27  HTN - patient takes Losartan/HCTZ 100/12 5 mg daily  Denies chest pain, shortness of breath, dizziness  He checks blood pressure at home  BP ranges in 140's -150/ 80"s  Patient started swimming at [facility] 3 times per week, walks, tries to lose weight  Seasonal allergies - symptoms improved on SingulaIr 10 mg daily  Denies nasal congestion, sore throat, cough  C/o rash, skin itching in groin area BL  Denies urinary frequency, burning with urination  Patient had colonoscopy at age 71  He was recommended to repeat colonoscopy in 10 years      Patient refusing Flu shot, Pneumovax,    The following portions of the patient's history were reviewed and updated as appropriate: allergies, current medications, past medical history, past social history, past surgical history and problem list     Review of Systems   Constitutional: Negative for activity change, appetite change, chills, fatigue and fever  HENT: Negative for congestion, ear pain, hearing loss, mouth sores, nosebleeds, postnasal drip, sore throat, tinnitus and trouble swallowing  Eyes: Negative for pain, discharge, redness, itching and visual disturbance  Respiratory: Negative for cough, chest tightness, shortness of breath and wheezing  Cardiovascular: Negative for chest pain, palpitations and leg swelling  Gastrointestinal: Negative for abdominal pain, blood in stool, constipation, diarrhea, nausea and vomiting  Genitourinary: Positive for frequency  Negative for difficulty urinating, dysuria, flank pain and hematuria  Nocturia x 2   Musculoskeletal: Negative for arthralgias (occasional pain in L hip), back pain, gait problem, joint swelling, myalgias and neck pain  Skin: Positive for rash (in groin area BL)  Negative for wound  Neurological: Negative for dizziness, syncope and headaches  Hematological: Negative  Psychiatric/Behavioral: Negative  Objective:      /90 (BP Location: Left arm, Patient Position: Sitting, Cuff Size: Large)   Pulse 64   Temp 98 5 °F (36 9 °C) (Tympanic)   Resp 16   Ht 5' 4 5" (1 638 m)   Wt 104 kg (228 lb 3 2 oz)   SpO2 100%   BMI 38 57 kg/m²          Physical Exam   Constitutional: He appears well-developed and well-nourished  Obese   HENT:   Head: Normocephalic and atraumatic  Right Ear: External ear normal    Left Ear: External ear normal    Mouth/Throat: Oropharynx is clear and moist    Eyes: Pupils are equal, round, and reactive to light  Conjunctivae are normal    Neck: Normal range of motion  Neck supple  No JVD present  Cardiovascular: Normal rate, regular rhythm and normal heart sounds  No murmur heard    No BL LE edema  No carotid bruits BL   Pulmonary/Chest: Effort normal and breath sounds normal    Abdominal: Soft  Bowel sounds are normal  There is no tenderness  Musculoskeletal:   Antalgic gait  No joints swelling   Skin: Skin is warm and dry  Erythematous rash in groin area BL   Psychiatric: He has a normal mood and affect  Nursing note and vitals reviewed

## 2019-10-21 NOTE — ASSESSMENT & PLAN NOTE
Discussed skin hygiene with patient  Prescription sent to the pharmacy for Lotrisone cream to apply twice daily to groin area BL  Call office if rash persists or spreading

## 2020-09-25 DIAGNOSIS — I10 ESSENTIAL HYPERTENSION: ICD-10-CM

## 2020-09-25 DIAGNOSIS — J30.1 SEASONAL ALLERGIC RHINITIS DUE TO POLLEN: ICD-10-CM

## 2020-09-25 RX ORDER — MONTELUKAST SODIUM 10 MG/1
10 TABLET ORAL
Qty: 30 TABLET | Refills: 5 | Status: SHIPPED | OUTPATIENT
Start: 2020-09-25 | End: 2020-10-05 | Stop reason: SDUPTHER

## 2020-09-25 RX ORDER — LOSARTAN POTASSIUM AND HYDROCHLOROTHIAZIDE 12.5; 1 MG/1; MG/1
1 TABLET ORAL DAILY
Qty: 30 TABLET | Refills: 6 | Status: SHIPPED | OUTPATIENT
Start: 2020-09-25 | End: 2020-10-05 | Stop reason: SDUPTHER

## 2020-09-26 ENCOUNTER — APPOINTMENT (EMERGENCY)
Dept: RADIOLOGY | Facility: HOSPITAL | Age: 75
End: 2020-09-26
Payer: COMMERCIAL

## 2020-09-26 ENCOUNTER — HOSPITAL ENCOUNTER (EMERGENCY)
Facility: HOSPITAL | Age: 75
Discharge: HOME | End: 2020-09-26
Attending: EMERGENCY MEDICINE
Payer: COMMERCIAL

## 2020-09-26 VITALS
SYSTOLIC BLOOD PRESSURE: 174 MMHG | WEIGHT: 220.24 LBS | HEART RATE: 68 BPM | OXYGEN SATURATION: 97 % | DIASTOLIC BLOOD PRESSURE: 78 MMHG | BODY MASS INDEX: 37.22 KG/M2 | TEMPERATURE: 98.2 F | RESPIRATION RATE: 18 BRPM

## 2020-09-26 DIAGNOSIS — S73.005A CLOSED DISLOCATION OF LEFT HIP, INITIAL ENCOUNTER (HCC): Primary | ICD-10-CM

## 2020-09-26 PROCEDURE — 99284 EMERGENCY DEPT VISIT MOD MDM: CPT | Performed by: ORTHOPAEDIC SURGERY

## 2020-09-26 PROCEDURE — 99152 MOD SED SAME PHYS/QHP 5/>YRS: CPT | Performed by: EMERGENCY MEDICINE

## 2020-09-26 PROCEDURE — 73501 X-RAY EXAM HIP UNI 1 VIEW: CPT

## 2020-09-26 PROCEDURE — 99284 EMERGENCY DEPT VISIT MOD MDM: CPT

## 2020-09-26 PROCEDURE — 73502 X-RAY EXAM HIP UNI 2-3 VIEWS: CPT

## 2020-09-26 PROCEDURE — 96375 TX/PRO/DX INJ NEW DRUG ADDON: CPT

## 2020-09-26 PROCEDURE — 96374 THER/PROPH/DIAG INJ IV PUSH: CPT

## 2020-09-26 PROCEDURE — 27265 TREAT HIP DISLOCATION: CPT | Performed by: ORTHOPAEDIC SURGERY

## 2020-09-26 PROCEDURE — 99285 EMERGENCY DEPT VISIT HI MDM: CPT | Performed by: EMERGENCY MEDICINE

## 2020-09-26 PROCEDURE — 96361 HYDRATE IV INFUSION ADD-ON: CPT

## 2020-09-26 RX ORDER — FENTANYL CITRATE 50 UG/ML
1 INJECTION, SOLUTION INTRAMUSCULAR; INTRAVENOUS ONCE
Status: COMPLETED | OUTPATIENT
Start: 2020-09-26 | End: 2020-09-26

## 2020-09-26 RX ORDER — PROPOFOL 10 MG/ML
200 INJECTION, EMULSION INTRAVENOUS ONCE
Status: COMPLETED | OUTPATIENT
Start: 2020-09-26 | End: 2020-09-26

## 2020-09-26 RX ORDER — ONDANSETRON 2 MG/ML
4 INJECTION INTRAMUSCULAR; INTRAVENOUS ONCE
Status: COMPLETED | OUTPATIENT
Start: 2020-09-26 | End: 2020-09-26

## 2020-09-26 RX ORDER — MORPHINE SULFATE 4 MG/ML
4 INJECTION, SOLUTION INTRAMUSCULAR; INTRAVENOUS ONCE
Status: COMPLETED | OUTPATIENT
Start: 2020-09-26 | End: 2020-09-26

## 2020-09-26 RX ADMIN — PROPOFOL 200 MG: 10 INJECTION, EMULSION INTRAVENOUS at 08:40

## 2020-09-26 RX ADMIN — MORPHINE SULFATE 4 MG: 4 INJECTION INTRAVENOUS at 07:53

## 2020-09-26 RX ADMIN — SODIUM CHLORIDE 1000 ML: 0.9 INJECTION, SOLUTION INTRAVENOUS at 08:28

## 2020-09-26 RX ADMIN — ONDANSETRON 4 MG: 2 INJECTION INTRAMUSCULAR; INTRAVENOUS at 08:28

## 2020-09-26 NOTE — PROCEDURES
Patient had a left total hip dislocation  ED physician, Dr Anders Pardo obtain informed consent  Dr Anders Pardo attempted closed reduction and was unsuccessful on initial attempt and Dr Anders Pardo asked for my assistance  The consent was signed and does specify any assistants as designated by Dr Anders Pardo  The patient was supine on stretcher and was sedated  Dr Anders Pardo placed counter traction on the L ASIS while I pulled traction and flexed the hip  While doing this I gently applied internal and external rotation to the hip and there was a audible and visual clunk  The leg was then placed back on the bed and found to be equal in length to the right leg  We rotated the hip and this was found to move without resistance  Post-procedure exam revealed the patient to have a normal exam as below  Left lower extremity  Sensation Intact to Light Touch in Sural, Saphenous, Tibial, Superficial Peroneal, and Deep Peroneal Nerve Distribution  Motor function 5 out of 5 strength in Tibialis Anterior, Gastrocnemius, Soleus, Extensor Hallucis Longus, and Flexor Hallucis Longus Muscles  Extremity Warm and Well Perfused  Brisk Capillary Refill in Toes  WBAT  Posterior hip precautions  followup in office 1-2 weeks  Krysten Maravilla MD  Adult Reconstruction  [facility] 09/26/20  9:34 AM

## 2020-09-26 NOTE — SEDATION DOCUMENTATION
Pt is fully awake, speaking and no longer drowsy  Able to breath deep and cough freely  VSS WNL  Pt denies any complaints

## 2020-09-26 NOTE — ED PROVIDER NOTES
History  Chief Complaint   Patient presents with    Hip Pain     pt has hx of left hip replacement, states he felt it "pop out" this morning  states it happened "8 times so far"       History provided by:  Patient   used: No    Hip Pain   Location:  Left hip  Quality:  Pain, dislocation  Severity:  Severe  Onset quality:  Sudden  Duration:  30 minutes  Timing:  Constant  Progression:  Unchanged  Chronicity:  New  Context:  Hx  replacement with prior dislocations  Relieved by:  None  Worsened by:  None  Ineffective treatments:  Fentanyl  Associated symptoms: no loss of consciousness and no shortness of breath        Prior to Admission Medications   Prescriptions Last Dose Informant Patient Reported? Taking?    Multiple Vitamin (MULTIVITAMIN) tablet  Self Yes Yes   Sig: Take 1 tablet by mouth daily   amoxicillin (AMOXIL) 500 mg capsule Not Taking at Unknown time Self Yes No   aspirin (ECOTRIN LOW STRENGTH) 81 mg EC tablet  Self Yes Yes   Sig: Take 81 mg by mouth daily   clotrimazole-betamethasone (LOTRISONE) 1-0 05 % cream Not Taking at Unknown time  No No   Sig: Apply topically 2 (two) times a day   Patient not taking: Reported on 9/26/2020   losartan-hydrochlorothiazide (HYZAAR) 100-12 5 MG per tablet   No Yes   Sig: Take 1 tablet by mouth daily   montelukast (SINGULAIR) 10 mg tablet   No Yes   Sig: Take 1 tablet (10 mg total) by mouth daily at bedtime      Facility-Administered Medications: None       Past Medical History:   Diagnosis Date    Hypertension     Kidney stone     Obesity        Past Surgical History:   Procedure Laterality Date    CHOLECYSTECTOMY      CLOSED REDUCTION HIP DISLOCATION  9/26/2020         HERNIA REPAIR      JOINT REPLACEMENT      LEG SURGERY         Family History   Problem Relation Age of Onset    Coronary artery disease Mother     Heart failure Mother     Coronary artery disease Father      I have reviewed and agree with the history as documented  E-Cigarette/Vaping     E-Cigarette/Vaping Substances     Social History     Tobacco Use    Smoking status: Never Smoker    Smokeless tobacco: Never Used   Substance Use Topics    Alcohol use: No     Comment: occ    Drug use: No       Review of Systems   Respiratory: Negative for shortness of breath  Musculoskeletal: Positive for gait problem  Neurological: Negative for loss of consciousness  All other systems reviewed and are negative  Physical Exam  Physical Exam  Vitals signs and nursing note reviewed  Constitutional:       General: He is not in acute distress  Appearance: He is well-developed  He is not diaphoretic  HENT:      Head: Normocephalic and atraumatic  Right Ear: External ear normal       Left Ear: External ear normal    Eyes:      General:         Right eye: No discharge  Left eye: No discharge  Pupils: Pupils are equal, round, and reactive to light  Neck:      Musculoskeletal: Normal range of motion and neck supple  Thyroid: No thyromegaly  Trachea: No tracheal deviation  Cardiovascular:      Rate and Rhythm: Normal rate and regular rhythm  Heart sounds: No murmur  Pulmonary:      Effort: Pulmonary effort is normal       Breath sounds: Normal breath sounds  Abdominal:      General: Bowel sounds are normal  There is no distension  Palpations: Abdomen is soft  Tenderness: There is no abdominal tenderness  Musculoskeletal: Normal range of motion  General: No deformity  Comments: Shortened, internally rotated left lower extremity with intact distal pulse and sensation  Skin:     General: Skin is warm  Capillary Refill: Capillary refill takes less than 2 seconds  Neurological:      Mental Status: He is alert and oriented to person, place, and time  Cranial Nerves: No cranial nerve deficit  Motor: No abnormal muscle tone     Psychiatric:         Behavior: Behavior normal          Vital Signs  ED Triage Vitals [09/26/20 0727]   Temperature Pulse Respirations Blood Pressure SpO2   98 2 °F (36 8 °C) 81 18 166/72 100 %      Temp Source Heart Rate Source Patient Position - Orthostatic VS BP Location FiO2 (%)   Oral Monitor -- -- --      Pain Score       7           Vitals:    09/26/20 0905 09/26/20 0906 09/26/20 0906 09/26/20 0915   BP: 149/73  149/73 134/61   Pulse:  68 71 70         Visual Acuity      ED Medications  Medications   sodium chloride 0 9 % bolus 1,000 mL (1,000 mL Intravenous New Bag 9/26/20 0828)   fentanyl citrate (PF) (FOR EMS ONLY) 100 mcg/2 mL injection 100 mcg (0 mcg Does not apply Given to EMS 9/26/20 0723)   morphine (PF) 4 mg/mL injection 4 mg (4 mg Intravenous Given 9/26/20 0753)   propofol (DIPRIVAN) 200 MG/20ML bolus injection 200 mg (200 mg Intravenous Given by Other 9/26/20 0840)   ondansetron (ZOFRAN) injection 4 mg (4 mg Intravenous Given 9/26/20 0563)       Diagnostic Studies  Results Reviewed     None                 XR hip/pelv 2-3 vws left if performed    (Results Pending)   XR hip/pelv 1 vw LEFT if performed    (Results Pending)              Procedures  Pre-Procedural Sedation  Performed by: Yulisa Carmona MD  Authorized by: Yulisa Carmona MD     Consent:     Consent obtained:  Written    Consent given by:  Patient    Risks discussed:   Allergic reaction, inadequate sedation, nausea, prolonged hypoxia resulting in organ damage, respiratory compromise necessitating ventilatory assistance and intubation and vomiting    Alternatives discussed:  Analgesia without sedation  Universal protocol:     Patient identity confirmation method:  Verbally with patient and arm band  Indications:     Sedation purpose:  Dislocation reduction    Procedure necessitating sedation performed by:  Physician performing sedation    Intended level of sedation:  Moderate (conscious sedation)  Pre-sedation assessment:     Time since last food or drink:  12 hours    ASA classification: class 2 - patient with mild systemic disease      Neck mobility: normal      Mouth opening:  3 or more finger widths    Thyromental distance:  4 finger widths    Mallampati score:  I - soft palate, uvula, fauces, pillars visible    Pre-sedation assessments completed and reviewed: hydration status      History of difficult intubation: no      Pre-sedation assessment completed:  9/26/2020 8:33 AM  Procedural Sedation    Date/Time: 9/26/2020 9:23 AM  Performed by: Kimberly Helms MD  Authorized by: Kimberly Helms MD     Immediate pre-procedure details:     Reassessment: Patient reassessed immediately prior to procedure      Reviewed: vital signs    Procedure details (see MAR for exact dosages):     Sedation start time:  9/26/2020 8:39 AM    Preoxygenation:  Nasal cannula    Sedation:  Propofol    Intra-procedure monitoring:  Blood pressure monitoring, cardiac monitor, continuous capnometry and continuous pulse oximetry    Intra-procedure management:  Airway repositioning    Sedation end time:  9/26/2020 9:11 AM    Total sedation time (minutes):  32  Post-procedure details:     Post-sedation assessment completed:  9/26/2020 9:44 AM    Attendance: Constant attendance by certified staff until patient recovered      Recovery: Patient returned to pre-procedure baseline      Post-sedation assessments completed and reviewed: airway patency, cardiovascular function, hydration status, mental status, nausea/vomiting, pain level and respiratory function      Patient is stable for discharge or admission: yes    Orthopedic injury treatment    Date/Time: 9/26/2020 9:41 AM  Performed by: Kimberly Helms MD  Authorized by: Kimberly Helms MD     Patient Location:  ED  Other Assisting Provider: Yes (comment)    Written consent obtained?: Yes    Required items: Required blood products, implants, devices and special equipment available    Injury location:  Hip  Neurovascular status: Neurovascularly intact    Distal perfusion: normal Neurological function: normal    Range of motion: normal    Local anesthesia used?: No    General anesthesia used?: No    Sedation type: Moderate (conscious) sedation (See separate Procedural Sedation form)  Skeletal traction used?: No     Unsuccessful attempt, orthopedics consulted      Conscious Sedation Assessment      Classification Score   ASA Scale Assessment  1-Healthy patient, no disease outside surgical process filed at 09/26/2020 0840             ED Course                                       MDM  Number of Diagnoses or Management Options  Closed dislocation of left hip, initial encounter [facility]): new and requires workup  Diagnosis management comments: Left hip dislocation, atraumatic and without fracture initial x-ray  Has happened before in the past   Patient last ate last evening  Procedural sedation with propofol, attempted close reduction by myself in ER unsuccessful  Orthopedic physician, Dr Samantha Maravilla was in department seeing another patient and was able to help successfully reduced hip during initial procedural sedation  He was offered follow-up  Repeat x-ray reveals no fracture, improved alignment  Return precautions discussed with patient  He is at his baseline  He is discharged home         Amount and/or Complexity of Data Reviewed  Tests in the radiology section of CPT®: ordered and reviewed  Discuss the patient with other providers: yes    Risk of Complications, Morbidity, and/or Mortality  Presenting problems: high  Diagnostic procedures: moderate  Management options: high    Patient Progress  Patient progress: resolved      Disposition  Final diagnoses:   Closed dislocation of left hip, initial encounter [facility])     Time reflects when diagnosis was documented in both MDM as applicable and the Disposition within this note     Time User Action Codes Description Comment    9/26/2020  9:22 AM Thierno Dunbar Add [[id number] 005A] Closed dislocation of left hip, initial encounter [facility])       ED Disposition     ED Disposition Condition Date/Time Comment    Discharge Stable Sat Sep 26, 2020  9:22 AM Gentry Hoang discharge to home/self care  Follow-up Information     Follow up With Specialties Details Why Contact Info Additional Information    Dion Gabriel MD Orthopedic Surgery Schedule an appointment as soon as possible for a visit in 3 days Follow-up for recurrent hip dislocation [address] [facility] [address]  [facility] [facility] [address] [facility] Emergency Medicine Go to  If symptoms worsen [address] [facility]  AN ED, [address], [place], [place], [address]          Patient's Medications   Discharge Prescriptions    No medications on file     No discharge procedures on file      PDMP Review     None          ED Provider  Electronically Signed by           Janet Gray MD  09/26/20 6925

## 2020-09-26 NOTE — CONSULTS
Orthopaedic Surgery Consult Note    Chief Complaint:  Chief Complaint   Patient presents with    Hip Pain     pt has hx of left hip replacement, states he felt it "pop out" this morning  states it happened "8 times so far"       HPI:  Nery Ramos is a 76 y o  male, who sustained a L IESHA dislocation while bending over to attend to his feet  He was transported to the [facility] [address] ED and radiographs demonstrated the dislocation  The patient reported to the ED Dr Frida Lowery that this has occurred at least 4 times previously  Dr Frida Lowery attempted closed reduction and the patient did provide some resistance and the initial reduction attempt was unsuccessful  While the patient was sedated I was walking through the ED after seeing another patient and Dr Frida Lowery requested my help to reduce the left IESHA dislocation  Dr Frida Lowery had previously obtained consent for the procedure and the consent does state that any assistants as directed by Dr Frida Lowery  I therefore performed the closed reduction with Dr Anil Payne assistance  We both felt this course of action to be in the best interest of the patient instead of waking him up from sedation and then resedating the patient  Orthopaedics was consulted to evaluate L IESHA dislocation  Pertinent highlights from PMHx and/or other injuries include: as below      Patient Active Problem List   Diagnosis    Lumbar spondylosis    Essential hypertension    History of total left hip arthroplasty    Obesity (BMI 35 0-39 9 without comorbidity)    Nephrolithiasis    Impaired fasting glucose    Medicare annual wellness visit, initial    Seasonal allergic rhinitis    Dyslipidemia    Candidal dermatitis       Past Medical History:   Diagnosis Date    Hypertension     Kidney stone     Obesity        Past Surgical History:   Procedure Laterality Date    CHOLECYSTECTOMY      HERNIA REPAIR      JOINT REPLACEMENT      LEG SURGERY          Social History     Socioeconomic History    Marital status:      Spouse name: Not on file    Number of children: Not on file    Years of education: Not on file    Highest education level: Not on file   Occupational History    Not on file   Social Needs    Financial resource strain: Not on file    Food insecurity     Worry: Not on file     Inability: Not on file    Transportation needs     Medical: Not on file     Non-medical: Not on file   Tobacco Use    Smoking status: Never Smoker    Smokeless tobacco: Never Used   Substance and Sexual Activity    Alcohol use: No     Comment: occ    Drug use: No    Sexual activity: Not on file   Lifestyle    Physical activity     Days per week: Not on file     Minutes per session: Not on file    Stress: Not on file   Relationships    Social connections     Talks on phone: Not on file     Gets together: Not on file     Attends Congregational service: Not on file     Active member of club or organization: Not on file     Attends meetings of clubs or organizations: Not on file     Relationship status: Not on file    Intimate partner violence     Fear of current or ex partner: Not on file     Emotionally abused: Not on file     Physically abused: Not on file     Forced sexual activity: Not on file   Other Topics Concern    Not on file   Social History Narrative    Not on file        Family History   Problem Relation Age of Onset    Coronary artery disease Mother     Heart failure Mother     Coronary artery disease Father         Allergies   Allergen Reactions    Epinephrine Other (See Comments)     "didn't feel right and had SOB for months"    Lidocaine      w/epi    Procaine         Prior to Admission medications    Medication Sig Start Date End Date Taking?  Authorizing Provider   aspirin (ECOTRIN LOW STRENGTH) 81 mg EC tablet Take 81 mg by mouth daily   Yes Historical Provider, MD   losartan-hydrochlorothiazide (HYZAAR) 100-12 5 MG per tablet Take 1 tablet by mouth daily 9/25/20  Yes Cherylyn Goodpasture MD Shannan   montelukast (SINGULAIR) 10 mg tablet Take 1 tablet (10 mg total) by mouth daily at bedtime 9/25/20  Yes Araceli Barkley MD   Multiple Vitamin (MULTIVITAMIN) tablet Take 1 tablet by mouth daily   Yes Historical Provider, MD   amoxicillin (AMOXIL) 500 mg capsule  10/11/18   Historical Provider, MD   clotrimazole-betamethasone (Myla Camera) 1-0 05 % cream Apply topically 2 (two) times a day  Patient not taking: Reported on 9/26/2020 10/21/19   Araceli Barkley MD        REVIEW OF SYSTEMS:  All systems negative except as per HPI  OBJECTIVE:  Vitals:    09/26/20 0906   BP: 149/73   Pulse: 71   Resp: 20   Temp:    SpO2: 99%        PHYSICAL EXAM  GENERAL: No acute distress  Alert and oriented  Well nourished and well hydrated  Appears stated age  HEENT : Normocephalic, atraumatic  Extraocular movements intact  Mucous membranes moist  NECK: Supple, trachea midline  LUNGS: Adequate and symmetric respiratory effort  No intercostal retractions or accessory muscle use  HEART: Extremities warm and perfused  ABDOMEN: Nondistended  SKIN: Warm and dry, no rash  Post reduction exam  MUSCULOSKELETAL EXAM  Left lower extremity is out to length and able to move freely  Sensation Intact to Light Touch in Sural, Saphenous, Tibial, Superficial Peroneal, and Deep Peroneal Nerve Distribution  Motor function 5 out of 5 strength in Tibialis Anterior, Gastrocnemius, Soleus, Extensor Hallucis Longus, and Flexor Hallucis Longus Muscles  Extremity Warm and Well Perfused  Brisk Capillary Refill in Toes  RADIOLOGY  I have personally reviewed radiology images:   Prereduction films demonstrate L IESHA dislocation  Post-reduction films demonstrate L IESHA in appropriate position with the joint appearing to be located without any fracture      PROCEDURE: left IESHA closed reduction, see procedute note    Assessment / Recommendations:    76 y o  male with the following musculoskeletal problems:    1  L IESHA dislocation  - Neurovascular intact after reduction  - WBAT  - posterior hip precautions  - recommend followup in office, discussed with patient that given multiple dislocation a further workup would be recommended and possible surgical revision  - patient reports he has no interest in any type of surgery and really does not want to followup in office  - recommend posterior hip precautions and patient reports he knows what those are and will be careful  Evgeny Ospina MD  [facility] of [address] [place] [address]  9:23 AM

## 2020-10-05 DIAGNOSIS — I10 ESSENTIAL HYPERTENSION: ICD-10-CM

## 2020-10-05 DIAGNOSIS — J30.1 SEASONAL ALLERGIC RHINITIS DUE TO POLLEN: ICD-10-CM

## 2020-10-05 RX ORDER — MONTELUKAST SODIUM 10 MG/1
10 TABLET ORAL
Qty: 30 TABLET | Refills: 6 | Status: SHIPPED | OUTPATIENT
Start: 2020-10-05 | End: 2021-10-18

## 2020-10-05 RX ORDER — LOSARTAN POTASSIUM AND HYDROCHLOROTHIAZIDE 12.5; 1 MG/1; MG/1
1 TABLET ORAL DAILY
Qty: 30 TABLET | Refills: 6 | Status: SHIPPED | OUTPATIENT
Start: 2020-10-05 | End: 2021-10-18

## 2020-12-07 DIAGNOSIS — B37.2 CANDIDAL DERMATITIS: ICD-10-CM

## 2020-12-07 RX ORDER — CLOTRIMAZOLE AND BETAMETHASONE DIPROPIONATE 10; .64 MG/G; MG/G
CREAM TOPICAL 2 TIMES DAILY
Qty: 45 G | Refills: 0 | Status: SHIPPED | OUTPATIENT
Start: 2020-12-07 | End: 2021-03-27

## 2020-12-17 NOTE — ED NOTES
Ambulation trial completed   Tolerated well with leg immobilizer      Gretta Headley  05/04/18 0012 1

## 2021-03-26 DIAGNOSIS — B37.2 CANDIDAL DERMATITIS: ICD-10-CM

## 2021-03-27 RX ORDER — CLOTRIMAZOLE AND BETAMETHASONE DIPROPIONATE 10; .64 MG/G; MG/G
CREAM TOPICAL 2 TIMES DAILY
Qty: 45 G | Refills: 0 | Status: SHIPPED | OUTPATIENT
Start: 2021-03-27 | End: 2021-10-18

## 2021-06-07 ENCOUNTER — OFFICE VISIT (OUTPATIENT)
Dept: FAMILY MEDICINE CLINIC | Facility: CLINIC | Age: 76
End: 2021-06-07

## 2021-06-07 VITALS
SYSTOLIC BLOOD PRESSURE: 122 MMHG | RESPIRATION RATE: 16 BRPM | TEMPERATURE: 99 F | BODY MASS INDEX: 37.99 KG/M2 | OXYGEN SATURATION: 97 % | WEIGHT: 228 LBS | HEIGHT: 65 IN | HEART RATE: 70 BPM | DIASTOLIC BLOOD PRESSURE: 84 MMHG

## 2021-06-07 DIAGNOSIS — E66.9 OBESITY (BMI 35.0-39.9 WITHOUT COMORBIDITY): ICD-10-CM

## 2021-06-07 DIAGNOSIS — N40.1 BPH ASSOCIATED WITH NOCTURIA: Primary | ICD-10-CM

## 2021-06-07 DIAGNOSIS — E78.5 DYSLIPIDEMIA: ICD-10-CM

## 2021-06-07 DIAGNOSIS — R35.1 BPH ASSOCIATED WITH NOCTURIA: Primary | ICD-10-CM

## 2021-06-07 DIAGNOSIS — I10 ESSENTIAL HYPERTENSION: ICD-10-CM

## 2021-06-07 LAB
BACTERIA UR QL AUTO: ABNORMAL /HPF
BILIRUB UR QL STRIP: NEGATIVE
CLARITY UR: CLEAR
COLOR UR: YELLOW
GLUCOSE UR STRIP-MCNC: ABNORMAL MG/DL
HGB UR QL STRIP.AUTO: NEGATIVE
HYALINE CASTS #/AREA URNS LPF: ABNORMAL /LPF
KETONES UR STRIP-MCNC: NEGATIVE MG/DL
LEUKOCYTE ESTERASE UR QL STRIP: NEGATIVE
NITRITE UR QL STRIP: NEGATIVE
NON-SQ EPI CELLS URNS QL MICRO: ABNORMAL /HPF
PH UR STRIP.AUTO: 6 [PH]
PROT UR STRIP-MCNC: NEGATIVE MG/DL
RBC #/AREA URNS AUTO: ABNORMAL /HPF
SL AMB  POCT GLUCOSE, UA: ABNORMAL
SL AMB LEUKOCYTE ESTERASE,UA: ABNORMAL
SL AMB POCT BILIRUBIN,UA: ABNORMAL
SL AMB POCT BLOOD,UA: ABNORMAL
SL AMB POCT CLARITY,UA: CLEAR
SL AMB POCT COLOR,UA: YELLOW
SL AMB POCT KETONES,UA: ABNORMAL
SL AMB POCT NITRITE,UA: ABNORMAL
SL AMB POCT PH,UA: 6
SL AMB POCT SPECIFIC GRAVITY,UA: 1.02
SL AMB POCT URINE PROTEIN: ABNORMAL
SL AMB POCT UROBILINOGEN: ABNORMAL
SP GR UR STRIP.AUTO: 1.02 (ref 1–1.03)
UROBILINOGEN UR QL STRIP.AUTO: 0.2 E.U./DL
WBC #/AREA URNS AUTO: ABNORMAL /HPF

## 2021-06-07 PROCEDURE — 81002 URINALYSIS NONAUTO W/O SCOPE: CPT | Performed by: FAMILY MEDICINE

## 2021-06-07 PROCEDURE — 99214 OFFICE O/P EST MOD 30 MIN: CPT | Performed by: FAMILY MEDICINE

## 2021-06-07 PROCEDURE — 81001 URINALYSIS AUTO W/SCOPE: CPT | Performed by: FAMILY MEDICINE

## 2021-06-07 NOTE — ASSESSMENT & PLAN NOTE
Check microscopic urinalysis  Recommended to avoid drinking fluids 2 hours prior to going to bed  Will refer to urology for evaluation

## 2021-06-07 NOTE — PROGRESS NOTES
Chief Complaint   Patient presents with    Nocturia     Urinary Frequenct at [facility]   Topic Date Due    Hepatitis C Screening  Never done    COVID-19 Vaccine (1) Never done    Annual Physical  Never done    Pneumococcal Vaccine: 65+ Years (1 of 1 - PPSV23) Never done    BMI: Followup Plan  10/21/2020    Influenza Vaccine (Season Ended) 09/01/2021    Fall Risk  06/07/2022    Depression Screening PHQ  06/07/2022    BMI: Adult  06/07/2022    DTaP,Tdap,and Td Vaccines (2 - Td) 12/03/2023    HIB Vaccine  Aged Out    Hepatitis B Vaccine  Aged Out    IPV Vaccine  Aged Out    Hepatitis A Vaccine  Aged Out    Meningococcal ACWY Vaccine  Aged Out    HPV Vaccine  Aged Out         BMI Counseling: Body mass index is 38 53 kg/m²  The BMI is above normal  Nutrition recommendations include decreasing portion sizes, encouraging healthy choices of fruits and vegetables, decreasing fast food intake, consuming healthier snacks, limiting drinks that contain sugar, moderation in carbohydrate intake, increasing intake of lean protein, reducing intake of saturated and trans fat and reducing intake of cholesterol  Exercise recommendations include exercising 3-5 times per week  No pharmacotherapy was ordered  Assessment/Plan:    BPH associated with nocturia  Check microscopic urinalysis  Recommended to avoid drinking fluids 2 hours prior to going to bed  Will refer to urology for evaluation  Essential hypertension  BP is stable  Continue Losartan/ HCTZ 100/12 5 mg daily  Follow a low sodium diet  Dyslipidemia  Follow low-cholesterol  low-fat diet  Check lipid panel  Obesity (BMI 35 0-39 9 without comorbidity)  Encouraged regular exercise  lose weight  HM: patient refusing immunizations including Covid vaccination  Diagnoses and all orders for this visit:    BPH associated with nocturia  -     Ambulatory referral to Urology;  Future    Essential hypertension  - Comprehensive metabolic panel; Future    Dyslipidemia  -     Lipid panel; Future    Obesity (BMI 35 0-39 9 without comorbidity)          Subjective:      Patient ID: Israel Wilson is a 68 y o  male  HPI     Patient presents to the office c/o frequent urination at night, weak stream, dribbling  Patient states that he goes to the bathroom at night every 1 5 hours  Denies blood in urine  No dysuria  No prior history of kidney stones  Denies family history of prostate cancer  PMHx: HTN, Obesity, Dyslipidemia  No changes in medications recently  Patient has been followed by [facility], was seen by [facility] physician in [place] in February this year  HTN - currently taking Losartan / HCTZ 100/12 5 mg 1 tablet daily  Denies chest pain, shortness of breath, dizziness  Denies tobacco use  Drinks 1 glass of wine with dinner  Refusing immunizations  The following portions of the patient's history were reviewed and updated as appropriate: allergies, current medications, past medical history, past social history, past surgical history and problem list     Review of Systems   Constitutional: Negative for activity change, appetite change, chills, fatigue and fever  HENT: Positive for hearing loss  Negative for congestion, mouth sores, nosebleeds, sore throat, tinnitus and trouble swallowing  Eyes: Negative for pain, discharge, redness, itching and visual disturbance  Respiratory: Negative for cough, chest tightness, shortness of breath and wheezing  Cardiovascular: Negative for chest pain, palpitations and leg swelling  Gastrointestinal: Negative for abdominal pain, blood in stool, constipation, diarrhea, nausea and vomiting  Genitourinary: Negative for difficulty urinating, dysuria, flank pain and hematuria  Nocturia x 4, dribbling   Musculoskeletal: Negative for arthralgias, back pain, gait problem and myalgias  Skin: Negative for rash     Neurological: Negative for dizziness and headaches  Hematological: Negative  Psychiatric/Behavioral: Positive for sleep disturbance  The patient is not nervous/anxious  Objective:      /84 (BP Location: Left arm, Patient Position: Sitting, Cuff Size: Large)   Pulse 70   Temp 99 °F (37 2 °C) (Tympanic)   Resp 16   Ht 5' 4 5" (1 638 m)   Wt 103 kg (228 lb)   SpO2 97%   BMI 38 53 kg/m²          Physical Exam  Vitals signs and nursing note reviewed  Constitutional:       Appearance: Normal appearance  He is obese  HENT:      Head: Normocephalic and atraumatic  Eyes:      Conjunctiva/sclera: Conjunctivae normal       Pupils: Pupils are equal, round, and reactive to light  Cardiovascular:      Rate and Rhythm: Normal rate and regular rhythm  Heart sounds: No murmur  Comments: Mild BL ankle edema  Pulmonary:      Effort: Pulmonary effort is normal       Breath sounds: Normal breath sounds  Abdominal:      General: There is no distension  Palpations: Abdomen is soft  Tenderness: There is no abdominal tenderness  There is no right CVA tenderness or left CVA tenderness  Musculoskeletal: Normal range of motion  General: No swelling, tenderness or deformity  Skin:     General: Skin is warm and dry  Findings: No rash  Neurological:      Mental Status: He is alert     Psychiatric:         Mood and Affect: Mood normal

## 2021-06-08 ENCOUNTER — TELEPHONE (OUTPATIENT)
Dept: UROLOGY | Facility: AMBULATORY SURGERY CENTER | Age: 76
End: 2021-06-08

## 2021-06-08 DIAGNOSIS — R73.01 IMPAIRED FASTING GLUCOSE: ICD-10-CM

## 2021-06-08 DIAGNOSIS — R81 GLUCOSURIA: Primary | ICD-10-CM

## 2021-06-08 NOTE — TELEPHONE ENCOUNTER
Working on [facility]  [employer], 1st attempt to contact pt, lmom to call the office to schedule an appointment  NEW PT being referred to Rosina Olivas from Dr Asher Yates for N40 1, R35 1 (ICD-10-CM) - BPH associated with nocturia    Dwaine Solorio No appointment spot held for this patient

## 2021-06-18 ENCOUNTER — APPOINTMENT (OUTPATIENT)
Dept: LAB | Facility: MEDICAL CENTER | Age: 76
End: 2021-06-18
Payer: COMMERCIAL

## 2021-06-18 DIAGNOSIS — R81 GLUCOSURIA: ICD-10-CM

## 2021-06-18 DIAGNOSIS — R73.01 IMPAIRED FASTING GLUCOSE: ICD-10-CM

## 2021-06-18 DIAGNOSIS — E78.5 DYSLIPIDEMIA: ICD-10-CM

## 2021-06-18 DIAGNOSIS — I10 ESSENTIAL HYPERTENSION: ICD-10-CM

## 2021-06-18 LAB
ALBUMIN SERPL BCP-MCNC: 3.9 G/DL (ref 3.5–5)
ALP SERPL-CCNC: 90 U/L (ref 46–116)
ALT SERPL W P-5'-P-CCNC: 26 U/L (ref 12–78)
ANION GAP SERPL CALCULATED.3IONS-SCNC: 3 MMOL/L (ref 4–13)
AST SERPL W P-5'-P-CCNC: 18 U/L (ref 5–45)
BILIRUB SERPL-MCNC: 0.66 MG/DL (ref 0.2–1)
BUN SERPL-MCNC: 17 MG/DL (ref 5–25)
CALCIUM SERPL-MCNC: 9.5 MG/DL (ref 8.3–10.1)
CHLORIDE SERPL-SCNC: 106 MMOL/L (ref 100–108)
CHOLEST SERPL-MCNC: 229 MG/DL (ref 50–200)
CO2 SERPL-SCNC: 31 MMOL/L (ref 21–32)
CREAT SERPL-MCNC: 0.91 MG/DL (ref 0.6–1.3)
EST. AVERAGE GLUCOSE BLD GHB EST-MCNC: 160 MG/DL
GFR SERPL CREATININE-BSD FRML MDRD: 82 ML/MIN/1.73SQ M
GLUCOSE P FAST SERPL-MCNC: 137 MG/DL (ref 65–99)
HBA1C MFR BLD: 7.2 %
HDLC SERPL-MCNC: 44 MG/DL
LDLC SERPL CALC-MCNC: 160 MG/DL (ref 0–100)
NONHDLC SERPL-MCNC: 185 MG/DL
POTASSIUM SERPL-SCNC: 4 MMOL/L (ref 3.5–5.3)
PROT SERPL-MCNC: 7.7 G/DL (ref 6.4–8.2)
SODIUM SERPL-SCNC: 140 MMOL/L (ref 136–145)
TRIGL SERPL-MCNC: 123 MG/DL

## 2021-06-18 PROCEDURE — 80061 LIPID PANEL: CPT

## 2021-06-18 PROCEDURE — 36415 COLL VENOUS BLD VENIPUNCTURE: CPT

## 2021-06-18 PROCEDURE — 80053 COMPREHEN METABOLIC PANEL: CPT

## 2021-06-18 PROCEDURE — 83036 HEMOGLOBIN GLYCOSYLATED A1C: CPT

## 2021-09-28 ENCOUNTER — TELEPHONE (OUTPATIENT)
Dept: FAMILY MEDICINE CLINIC | Facility: CLINIC | Age: 76
End: 2021-09-28

## 2021-10-14 DIAGNOSIS — B37.2 CANDIDAL DERMATITIS: ICD-10-CM

## 2021-10-14 DIAGNOSIS — I10 ESSENTIAL HYPERTENSION: ICD-10-CM

## 2021-10-14 DIAGNOSIS — J30.1 SEASONAL ALLERGIC RHINITIS DUE TO POLLEN: ICD-10-CM

## 2021-10-18 RX ORDER — MONTELUKAST SODIUM 10 MG/1
TABLET ORAL
Qty: 90 TABLET | Refills: 0 | Status: SHIPPED | OUTPATIENT
Start: 2021-10-18

## 2021-10-18 RX ORDER — CLOTRIMAZOLE AND BETAMETHASONE DIPROPIONATE 10; .64 MG/G; MG/G
CREAM TOPICAL 2 TIMES DAILY
Qty: 45 G | Refills: 0 | Status: SHIPPED | OUTPATIENT
Start: 2021-10-18 | End: 2022-04-20

## 2021-10-18 RX ORDER — LOSARTAN POTASSIUM AND HYDROCHLOROTHIAZIDE 12.5; 1 MG/1; MG/1
TABLET ORAL
Qty: 90 TABLET | Refills: 0 | Status: SHIPPED | OUTPATIENT
Start: 2021-10-18

## 2022-04-20 DIAGNOSIS — B37.2 CANDIDAL DERMATITIS: ICD-10-CM

## 2022-04-20 RX ORDER — CLOTRIMAZOLE AND BETAMETHASONE DIPROPIONATE 10; .64 MG/G; MG/G
CREAM TOPICAL 2 TIMES DAILY
Qty: 45 G | Refills: 0 | Status: SHIPPED | OUTPATIENT
Start: 2022-04-20

## 2022-05-06 ENCOUNTER — APPOINTMENT (EMERGENCY)
Dept: RADIOLOGY | Facility: HOSPITAL | Age: 77
End: 2022-05-06
Payer: COMMERCIAL

## 2022-05-06 ENCOUNTER — HOSPITAL ENCOUNTER (EMERGENCY)
Facility: HOSPITAL | Age: 77
Discharge: HOME | End: 2022-05-06
Attending: EMERGENCY MEDICINE | Admitting: EMERGENCY MEDICINE
Payer: COMMERCIAL

## 2022-05-06 VITALS
RESPIRATION RATE: 18 BRPM | SYSTOLIC BLOOD PRESSURE: 164 MMHG | DIASTOLIC BLOOD PRESSURE: 74 MMHG | TEMPERATURE: 98.1 F | OXYGEN SATURATION: 95 % | HEART RATE: 74 BPM

## 2022-05-06 DIAGNOSIS — S73.005D HIP DISLOCATION, LEFT, SUBSEQUENT ENCOUNTER: Primary | ICD-10-CM

## 2022-05-06 PROCEDURE — 96374 THER/PROPH/DIAG INJ IV PUSH: CPT

## 2022-05-06 PROCEDURE — 99284 EMERGENCY DEPT VISIT MOD MDM: CPT

## 2022-05-06 PROCEDURE — 99285 EMERGENCY DEPT VISIT HI MDM: CPT | Performed by: EMERGENCY MEDICINE

## 2022-05-06 PROCEDURE — 99152 MOD SED SAME PHYS/QHP 5/>YRS: CPT | Performed by: EMERGENCY MEDICINE

## 2022-05-06 PROCEDURE — 27265 TREAT HIP DISLOCATION: CPT | Performed by: EMERGENCY MEDICINE

## 2022-05-06 PROCEDURE — 73502 X-RAY EXAM HIP UNI 2-3 VIEWS: CPT

## 2022-05-06 PROCEDURE — 96361 HYDRATE IV INFUSION ADD-ON: CPT

## 2022-05-06 RX ORDER — PROPOFOL 10 MG/ML
20 INJECTION, EMULSION INTRAVENOUS ONCE AS NEEDED
Status: COMPLETED | OUTPATIENT
Start: 2022-05-06 | End: 2022-05-06

## 2022-05-06 RX ORDER — PROPOFOL 10 MG/ML
20 INJECTION, EMULSION INTRAVENOUS ONCE
Status: COMPLETED | OUTPATIENT
Start: 2022-05-06 | End: 2022-05-06

## 2022-05-06 RX ORDER — FENTANYL CITRATE 50 UG/ML
1 INJECTION, SOLUTION INTRAMUSCULAR; INTRAVENOUS ONCE
Status: COMPLETED | OUTPATIENT
Start: 2022-05-06 | End: 2022-05-06

## 2022-05-06 RX ORDER — FENTANYL CITRATE 50 UG/ML
100 INJECTION, SOLUTION INTRAMUSCULAR; INTRAVENOUS ONCE
Status: COMPLETED | OUTPATIENT
Start: 2022-05-06 | End: 2022-05-06

## 2022-05-06 RX ORDER — PROPOFOL 10 MG/ML
75 INJECTION, EMULSION INTRAVENOUS ONCE
Status: COMPLETED | OUTPATIENT
Start: 2022-05-06 | End: 2022-05-06

## 2022-05-06 RX ADMIN — PROPOFOL 20 MG: 10 INJECTION, EMULSION INTRAVENOUS at 21:39

## 2022-05-06 RX ADMIN — PROPOFOL 20 MG: 10 INJECTION, EMULSION INTRAVENOUS at 21:43

## 2022-05-06 RX ADMIN — SODIUM CHLORIDE 1000 ML: 0.9 INJECTION, SOLUTION INTRAVENOUS at 21:26

## 2022-05-06 RX ADMIN — FENTANYL CITRATE 100 MCG: 50 INJECTION INTRAMUSCULAR; INTRAVENOUS at 21:09

## 2022-05-06 RX ADMIN — PROPOFOL 75 MG: 10 INJECTION, EMULSION INTRAVENOUS at 21:36

## 2022-05-06 RX ADMIN — PROPOFOL 20 MG: 10 INJECTION, EMULSION INTRAVENOUS at 21:38

## 2022-05-07 NOTE — ED ATTENDING ATTESTATION
5/6/2022  IJolie MD, saw and evaluated the patient  I have discussed the patient with the resident/non-physician practitioner and agree with the resident's/non-physician practitioner's findings, Plan of Care, and MDM as documented in the resident's/non-physician practitioner's note, except where noted  All available labs and Radiology studies were reviewed  I was present for key portions of any procedure(s) performed by the resident/non-physician practitioner and I was immediately available to provide assistance  At this point I agree with the current assessment done in the Emergency Department  I have conducted an independent evaluation of this patient a history and physical is as follows: Hip pain, patient bent over and his L BRANDON dislocated has happened multiple times before  Last in 2020, has not seen ortho for follow up  NV intact, though limited 2/2 severe pain  10/10    VS and nursing notes reviewed  General: Appears in NAD, awake, alert, speaking normally in full sentences  Well-nourished, well-developed  Appears stated age  Head: Normocephalic, atraumatic  Eyes: EOMI  Vision grossly normal  No subconjunctival hemorrhages or occular discharge noted  Symmetrical lids  ENT: Atraumatic external nose and ears  No stridor  Normal phonation  No drooling  Normal swallowing  Neck: No JVD  FROM  No goiter  CV: No pallor  Normal rate  Lungs: No tachypnea  No respiratory distress  MSK: LLE shorted, internally rotated  no peripheral edema  Dec cap refill in the LLE  Skin: Dry, intact  No cyanosis  Neuro: Awake, alert, GCS15  CN II-XII grossly intact  Grossly normal gait (post reduction)  Psychiatric/Behavioral: Appropriate mood and affect  A/P: This is a 68 y o  male who presents to the ED for evaluation of hip disloction  Sedation, reduction, trial of ambulation  Pain control  Reevaluate and dispo accordingly      Written consent obtained, but patient refused to sign due to pain, witnessed by nursing staff in room  ED Course         Critical Care Time  Pre-Procedural Sedation  Performed by: Raman Salinas MD  Authorized by: Raman Salinas MD     Consent:     Consent obtained:  Written    Consent given by:  Patient    Risks discussed: Allergic reaction, dysrhythmia, inadequate sedation, nausea, prolonged hypoxia resulting in organ damage, prolonged sedation necessitating reversal, respiratory compromise necessitating ventilatory assistance and intubation and vomiting  Universal protocol:     Procedure explained and questions answered to patient or proxy's satisfaction: yes      Relevant documents present and verified: yes      Test results available and properly labeled: yes      Radiology Images displayed and confirmed    If images not available, report reviewed: yes      Required blood products, implants, devices, and special equipment available: yes      Site/side marked: yes      Immediately prior to procedure a time out was called: yes      Patient identity confirmation method:  Verbally with patient and arm band  Indications:     Sedation purpose:  Dislocation reduction    Procedure necessitating sedation performed by:  Physician performing sedation    Intended level of sedation:  Moderate (conscious sedation)  Pre-sedation assessment:     Time since last food or drink:  Breakfast    NPO status caution: urgency dictates proceeding with non-ideal NPO status      ASA classification: class 3 - patient with severe systemic disease      Neck mobility: normal      Mouth opening:  3 or more finger widths    Thyromental distance:  3 finger widths    Mallampati score:  II - soft palate, uvula, fauces visible    Pre-sedation assessments completed and reviewed: airway patency, cardiovascular function, hydration status, mental status, nausea/vomiting, pain level, respiratory function and temperature      History of difficult intubation: no      Pre-sedation assessment completed:  5/6/2022 9:06 PM

## 2022-05-08 NOTE — ED PROVIDER NOTES
History  Chief Complaint   Patient presents with    Hip Injury     Pt brought in from home via EMS w/ dislocated left hip and lowering himself to floor, reports having had this happen 6 times previously  Visible shortening of left leg and medial rotation  Patient is a 70-year-old male with history of left total hip repair presenting for evaluation of hip dislocation  Patient states this has happened to him multiple times in the past   He was instructed to follow up after the most recent dislocation with Orthopedic surgery and did not  Today he was bending over to latch at his sandal when he felt his hip pop out of place  Patient is in marked distress, endorsing extreme pain in his left hip  Patient able to move toes and endorses full distal sensation  Prior to Admission Medications   Prescriptions Last Dose Informant Patient Reported? Taking?    Multiple Vitamin (MULTIVITAMIN) tablet  Self Yes No   Sig: Take 1 tablet by mouth daily   amoxicillin (AMOXIL) 500 mg capsule  Self Yes No   aspirin (ECOTRIN LOW STRENGTH) 81 mg EC tablet  Self Yes No   Sig: Take 81 mg by mouth daily   clotrimazole-betamethasone (LOTRISONE) 1-0 05 % cream   No No   Sig: Apply topically 2 (two) times a day   losartan-hydrochlorothiazide (HYZAAR) 100-12 5 MG per tablet   No No   Sig: TAKE ONE TABLET BY MOUTH EVERY DAY   montelukast (SINGULAIR) 10 mg tablet   No No   Sig: TAKE ONE TABLET BY MOUTH EVERY DAY      Facility-Administered Medications: None       Past Medical History:   Diagnosis Date    Hypertension     Kidney stone     Obesity        Past Surgical History:   Procedure Laterality Date    CHOLECYSTECTOMY      CLOSED REDUCTION HIP DISLOCATION  9/26/2020         HERNIA REPAIR      JOINT REPLACEMENT      LEG SURGERY         Family History   Problem Relation Age of Onset    Coronary artery disease Mother     Heart failure Mother     Coronary artery disease Father      I have reviewed and agree with the history as documented  E-Cigarette/Vaping     E-Cigarette/Vaping Substances     Social History     Tobacco Use    Smoking status: Never Smoker    Smokeless tobacco: Never Used   Substance Use Topics    Alcohol use: No     Comment: occ    Drug use: No        Review of Systems   Constitutional: Negative  HENT: Negative  Eyes: Negative  Respiratory: Negative  Cardiovascular: Negative  Gastrointestinal: Negative  Endocrine: Negative  Genitourinary: Negative  Musculoskeletal: Negative  L hip pain   Skin: Negative  Allergic/Immunologic: Negative  Neurological: Negative  Hematological: Negative  Psychiatric/Behavioral: Negative  All other systems reviewed and are negative  Physical Exam  ED Triage Vitals   Temperature Pulse Respirations Blood Pressure SpO2   05/06/22 2029 05/06/22 2029 05/06/22 2029 05/06/22 2029 05/06/22 2029   98 1 °F (36 7 °C) 83 20 (!) 208/93 98 %      Temp Source Heart Rate Source Patient Position - Orthostatic VS BP Location FiO2 (%)   05/06/22 2029 05/06/22 2029 05/06/22 2029 05/06/22 2029 --   Oral Monitor Lying Right arm       Pain Score       05/06/22 2109       10 - Worst Possible Pain             Orthostatic Vital Signs  Vitals:    05/06/22 2230 05/06/22 2245 05/06/22 2300 05/06/22 2315   BP:  163/71 (!) 182/81 164/74   Pulse: 76 78 76 74   Patient Position - Orthostatic VS:           Physical Exam  Vitals and nursing note reviewed  Constitutional:       General: He is in acute distress  Appearance: Normal appearance  He is not ill-appearing, toxic-appearing or diaphoretic  HENT:      Head: Normocephalic and atraumatic  Eyes:      General: No scleral icterus  Right eye: No discharge  Left eye: No discharge  Extraocular Movements: Extraocular movements intact  Conjunctiva/sclera: Conjunctivae normal       Pupils: Pupils are equal, round, and reactive to light     Cardiovascular:      Rate and Rhythm: Normal rate       Pulses: Normal pulses  Heart sounds: Normal heart sounds  Pulmonary:      Effort: Pulmonary effort is normal  No respiratory distress  Breath sounds: Normal breath sounds  No stridor  No wheezing, rhonchi or rales  Abdominal:      General: Abdomen is flat  Bowel sounds are normal  There is no distension  Palpations: Abdomen is soft  Tenderness: There is no abdominal tenderness  There is no guarding or rebound  Musculoskeletal:         General: Swelling, tenderness and signs of injury present  Normal range of motion  Cervical back: Normal range of motion  No rigidity  Right lower leg: No edema  Left lower leg: No edema  Comments: Left leg shortening, medial rotation, marked pain to palpation of left hip   Skin:     General: Skin is warm and dry  Capillary Refill: Capillary refill takes less than 2 seconds  Coloration: Skin is not jaundiced  Findings: No bruising or lesion  Neurological:      General: No focal deficit present  Mental Status: He is alert and oriented to person, place, and time  Mental status is at baseline  Psychiatric:         Mood and Affect: Mood normal          Behavior: Behavior normal          Thought Content:  Thought content normal          Judgment: Judgment normal          ED Medications  Medications   fentanyl citrate (PF) (FOR EMS ONLY) 100 mcg/2 mL injection 100 mcg (0 mcg Does not apply Given to EMS 5/6/22 2032)   fentanyl citrate (PF) 100 MCG/2ML 100 mcg (100 mcg Intravenous Given 5/6/22 2109)   propofol (DIPRIVAN) 200 MG/20ML bolus injection 75 mg (75 mg Intravenous Given by Other 5/6/22 2136)   propofol (DIPRIVAN) 200 MG/20ML bolus injection 20 mg (20 mg Intravenous Given by Other 5/6/22 2143)   propofol (DIPRIVAN) 200 MG/20ML bolus injection 20 mg (20 mg Intravenous Given by Other 5/6/22 2139)   propofol (DIPRIVAN) 200 MG/20ML bolus injection 20 mg (20 mg Intravenous Given by Other 5/6/22 2138)   sodium chloride 0 9 % bolus 1,000 mL (0 mL Intravenous Stopped 5/6/22 8744)       Diagnostic Studies  Results Reviewed     None                 XR hip/pelv 2-3 vws left if performed   Final Result by Harvey Cheng DO (05/07 1035)   Successful reduction of patient's left hip arthroplasty  If warranted, consider follow-up orthopedic surgery consultation, given the history of recurrence of the dislocation  Workstation performed: [id number]         XR hip/pelv 2-3 vws left if performed   Final Result by Harvey Cheng DO (05/07 1034)   Dislocation of patient's left hip arthroplasty              Workstation performed: [id number]               Procedures  Procedural Sedation    Date/Time: 5/6/2022 11:50 PM  Performed by: Ozzy Marie DO  Authorized by: Ozzy Marie DO     Immediate pre-procedure details:     Reassessment: Patient reassessed immediately prior to procedure      Reviewed: vital signs and relevant labs/tests      Verified: bag valve mask available, emergency equipment available, intubation equipment available, IV patency confirmed, oxygen available and suction available    Procedure details (see MAR for exact dosages):     Sedation start time:  5/6/2022 11:36 PM    Preoxygenation:  Room air    Sedation:  Propofol    Intra-procedure monitoring:  Blood pressure monitoring, cardiac monitor, continuous capnometry, continuous pulse oximetry, frequent LOC assessments and frequent vital sign checks    Intra-procedure events: none      Sedation end time:  5/6/2022 12:00 AM    Total sedation time (minutes):  15  Post-procedure details:     Post-sedation assessment completed:  5/7/2022 2:52 AM    Attendance: Constant attendance by certified staff until patient recovered      Recovery: Patient returned to pre-procedure baseline      Post-sedation assessments completed and reviewed: post-procedure airway patency not reviewed, post-procedure cardiovascular function not reviewed, post-procedure hydration status not reviewed, post-procedure mental status not reviewed and post-procedure nausea and vomiting status not reviewed      Patient is stable for discharge or admission: yes      Patient tolerance: Tolerated well, no immediate complications  Orthopedic injury treatment    Date/Time: 5/6/2022 11:36 PM  Performed by: Shi Solorio DO  Authorized by: Shi Solorio DO     Patient Location:  ED  Verbal consent obtained?: Yes    Written consent obtained?: Yes    Risks and benefits: Risks, benefits and alternatives were discussed    Consent given by:  Patient  Required items: Required blood products, implants, devices and special equipment available    Time out: Immediately prior to the procedure a time out was called    Injury location:  Hip  Location details:  Left hip  Injury type:  Dislocation  Spontaneous?: Yes    Prosthesis?: Yes    Neurovascular status: Neurovascularly intact    Distal perfusion: normal    Neurological function: normal    Range of motion: reduced    Sedation type:   Moderate (conscious) sedation (See separate Procedural Sedation form)  Manipulation performed?: Yes    Reduction method:  Traction and counter traction  Reduction method:  Traction and counter traction  Reduction method:  Traction and counter traction  Reduction method:  Traction and counter traction  Reduction method:  Traction and counter traction  Reduction method:  Traction and counter traction  Reduction successful?: Yes    Confirmation: Reduction confirmed by x-ray    Neurovascular status: Neurovascularly intact    Distal perfusion: normal    Neurological function: normal    Range of motion: normal    Patient tolerance:  Patient tolerated the procedure well with no immediate complications   Patient denied crutches, ambulatory in ED      Conscious Sedation Assessment      Classification Score   ASA Scale Assessment 1-Healthy patient, no disease outside surgical process filed at 05/06/2022 2132          ED Course                                       MDM  Number of Diagnoses or Management Options  Hip dislocation, left, subsequent encounter: new and does not require workup  Diagnosis management comments: -patient presented to emergency department for evaluation of hip dislocation  -patient states this occurred spontaneously earlier while latching his sandal  -patient states he has not followed up with Orthopedics since his last dislocation  -reduction successful, post-reduction xray normal  -patient given her return precautions, instructions for follow-up with Orthopedics, discharged from emergency department       Amount and/or Complexity of Data Reviewed  Tests in the radiology section of CPT®: ordered and reviewed  Discussion of test results with the performing providers: yes  Obtain history from someone other than the patient: yes  Discuss the patient with other providers: yes  Independent visualization of images, tracings, or specimens: yes        Disposition  Final diagnoses:   Hip dislocation, left, subsequent encounter     Time reflects when diagnosis was documented in both MDM as applicable and the Disposition within this note     Time User Action Codes Description Comment    5/6/2022 11:30 PM Yoni Prather Add [[id number]] Hip dislocation, left, subsequent encounter       ED Disposition     ED Disposition Condition Date/Time Comment    Discharge Stable Fri May 6, 2022 11:35 PM Velton Seat discharge to home/self care              Follow-up Information     Follow up With Specialties Details Why Contact Info Additional [address] [facility] Call in 1 day to arrange for next available appointment [address] [facility] [place] [facility] [facility], [address], [address] [facility] [place]          Discharge Medication List as of 5/6/2022 11:35 PM      CONTINUE these medications which have NOT CHANGED    Details   amoxicillin (AMOXIL) 500 mg capsule Starting Thu 10/11/2018, Historical Med      aspirin (ECOTRIN LOW STRENGTH) 81 mg EC tablet Take 81 mg by mouth daily, Historical Med      clotrimazole-betamethasone (LOTRISONE) 1-0 05 % cream Apply topically 2 (two) times a day, Starting Wed 4/20/2022, Normal      losartan-hydrochlorothiazide (HYZAAR) 100-12 5 MG per tablet TAKE ONE TABLET BY MOUTH EVERY DAY, Normal      montelukast (SINGULAIR) 10 mg tablet TAKE ONE TABLET BY MOUTH EVERY DAY, Normal      Multiple Vitamin (MULTIVITAMIN) tablet Take 1 tablet by mouth daily, Historical Med           No discharge procedures on file  PDMP Review     None           ED Provider  Attending physically available and evaluated Ericka Suh I managed the patient along with the ED Attending      Electronically Signed by         Seble Simmons DO  05/10/22 3296

## 2022-08-17 LAB — HBA1C MFR BLD HPLC: 6.3 %

## 2022-08-23 ENCOUNTER — OFFICE VISIT (OUTPATIENT)
Dept: FAMILY MEDICINE CLINIC | Facility: CLINIC | Age: 77
End: 2022-08-23
Payer: OTHER GOVERNMENT

## 2022-08-23 VITALS
OXYGEN SATURATION: 96 % | SYSTOLIC BLOOD PRESSURE: 142 MMHG | RESPIRATION RATE: 20 BRPM | WEIGHT: 200.4 LBS | HEIGHT: 63 IN | DIASTOLIC BLOOD PRESSURE: 82 MMHG | TEMPERATURE: 98.9 F | HEART RATE: 56 BPM | BODY MASS INDEX: 35.51 KG/M2

## 2022-08-23 DIAGNOSIS — Z00.00 ANNUAL PHYSICAL EXAM: ICD-10-CM

## 2022-08-23 DIAGNOSIS — H61.21 IMPACTED CERUMEN, RIGHT EAR: ICD-10-CM

## 2022-08-23 DIAGNOSIS — I10 ESSENTIAL HYPERTENSION: Primary | ICD-10-CM

## 2022-08-23 DIAGNOSIS — Z23 ENCOUNTER FOR IMMUNIZATION: ICD-10-CM

## 2022-08-23 DIAGNOSIS — E78.5 DYSLIPIDEMIA: ICD-10-CM

## 2022-08-23 DIAGNOSIS — R73.01 IMPAIRED FASTING GLUCOSE: ICD-10-CM

## 2022-08-23 DIAGNOSIS — Z11.59 NEED FOR HEPATITIS C SCREENING TEST: ICD-10-CM

## 2022-08-23 DIAGNOSIS — R97.20 ELEVATED PSA: ICD-10-CM

## 2022-08-23 DIAGNOSIS — E66.9 OBESITY (BMI 35.0-39.9 WITHOUT COMORBIDITY): ICD-10-CM

## 2022-08-23 PROCEDURE — 69210 REMOVE IMPACTED EAR WAX UNI: CPT | Performed by: NURSE PRACTITIONER

## 2022-08-23 PROCEDURE — 99397 PER PM REEVAL EST PAT 65+ YR: CPT | Performed by: NURSE PRACTITIONER

## 2022-08-23 NOTE — ASSESSMENT & PLAN NOTE
The patient with prediabetes in the past   He recently had blood work done on Monday of this week at the [place]  We will contact that office to obtain those results

## 2022-08-23 NOTE — ASSESSMENT & PLAN NOTE
Blood pressure today in the office is 142/82 which is stable  The patient takes his blood pressure medication at bedtime  Low-salt diet recommended  Weight loss recommended

## 2022-08-23 NOTE — ASSESSMENT & PLAN NOTE
Elevated lipid panel in the past   The patient is resistant to statins  His ASCVD risk score is elevated    He recently had a lipid panel on Monday we will obtain those results from the [facility]     The 10-year ASCVD risk score (Morena Anne et al , 2013) is: 39 2%    Values used to calculate the score:      Age: 68 years      Sex: Male      Is Non- : No      Diabetic: No      Tobacco smoker: No      Systolic Blood Pressure: 798 mmHg      Is BP treated: Yes      HDL Cholesterol: 44 mg/dL      Total Cholesterol: 229 mg/dL

## 2022-08-23 NOTE — PROGRESS NOTES
[address] [facility]    NAME: Dasia De La Rosa  AGE: 68 y o  SEX: male  : 1945     DATE: 2022     Assessment and Plan:     Problem List Items Addressed This Visit        Endocrine    Impaired fasting glucose     The patient with prediabetes in the past   He recently had blood work done on Monday of this week at the [place]  We will contact that office to obtain those results  Cardiovascular and Mediastinum    Essential hypertension - Primary     Blood pressure today in the office is 142/82 which is stable  The patient takes his blood pressure medication at bedtime  Low-salt diet recommended  Weight loss recommended  Other    Obesity (BMI 35 0-39 9 without comorbidity)     Patient's BMI in the office today is 35  Lifestyle modifications discussed  Dyslipidemia     Elevated lipid panel in the past   The patient is resistant to statins  His ASCVD risk score is elevated  He recently had a lipid panel on Monday we will obtain those results from the [facility]     The 10-year ASCVD risk score (Usman Mendoza et al , 2013) is: 39 2%    Values used to calculate the score:      Age: 68 years      Sex: Male      Is Non- : No      Diabetic: No      Tobacco smoker: No      Systolic Blood Pressure: 182 mmHg      Is BP treated: Yes      HDL Cholesterol: 44 mg/dL      Total Cholesterol: 229 mg/dL           Elevated PSA     Patient with chronically elevated PSA  He does have an upcoming appointment with Urology  Other Visit Diagnoses     Need for hepatitis C screening test        Encounter for immunization        Annual physical exam        Impacted cerumen, right ear        Relevant Orders    Ear cerumen removal (Completed)          Immunizations and preventive care screenings were discussed with patient today   Appropriate education was printed on patient's after visit summary  Counseling:  Alcohol/drug use: discussed moderation in alcohol intake, the recommendations for healthy alcohol use, and avoidance of illicit drug use  Dental Health: discussed importance of regular tooth brushing, flossing, and dental visits  Injury prevention: discussed safety/seat belts, safety helmets, smoke detectors, carbon dioxide detectors, and smoking near bedding or upholstery  Sexual health: discussed sexually transmitted diseases, partner selection, use of condoms, avoidance of unintended pregnancy, and contraceptive alternatives  Exercise: the importance of regular exercise/physical activity was discussed  Recommend exercise 3-5 times per week for at least 30 minutes  BMI Counseling: Body mass index is 35 78 kg/m²  The BMI is above normal  Nutrition recommendations include decreasing portion sizes, encouraging healthy choices of fruits and vegetables, moderation in carbohydrate intake, increasing intake of lean protein, reducing intake of saturated and trans fat and reducing intake of cholesterol  Exercise recommendations include moderate physical activity 150 minutes/week and exercising 3-5 times per week  Rationale for BMI follow-up plan is due to patient being overweight or obese  Depression Screening and Follow-up Plan: Patient was screened for depression during today's encounter  They screened negative with a PHQ-2 score of 0  No follow-ups on file  Chief Complaint:     Chief Complaint   Patient presents with    Physical Exam     Preventative  History of Present Illness:     Adult Annual Physical   Patient here for a comprehensive physical exam  The patient reports no problems  Diet and Physical Activity  Diet/Nutrition: well balanced diet, heart healthy (low sodium) diet, low fat diet and consuming 3-5 servings of fruits/vegetables daily  Exercise: no formal exercise        Depression Screening  PHQ-2/9 Depression Screening    Little interest or pleasure in doing things: 0 - not at all  Feeling down, depressed, or hopeless: 0 - not at all  PHQ-2 Score: 0  PHQ-2 Interpretation: Negative depression screen       General Health  Sleep: sleeps well and gets 7-8 hours of sleep on average  Hearing: normal - bilateral   Vision: goes for regular eye exams, most recent eye exam <1 year ago and wears glasses  Dental: regular dental visits and brushes teeth twice daily   Health  Symptoms include: none     Review of Systems:     Review of Systems   Constitutional: Negative  Negative for fatigue  HENT: Negative  Negative for congestion, postnasal drip, rhinorrhea and trouble swallowing  Eyes: Negative  Negative for visual disturbance  Respiratory: Negative  Negative for choking and shortness of breath  Cardiovascular: Negative  Negative for chest pain  Gastrointestinal: Negative  Endocrine: Negative  Genitourinary: Negative  Musculoskeletal: Positive for arthralgias  Negative for back pain, myalgias and neck pain  Skin: Negative  Neurological: Negative for dizziness and headaches  Psychiatric/Behavioral: Negative         Past Medical History:     Past Medical History:   Diagnosis Date    Hypertension     Kidney stone     Obesity       Past Surgical History:     Past Surgical History:   Procedure Laterality Date    CHOLECYSTECTOMY      CLOSED REDUCTION HIP DISLOCATION  9/26/2020         HERNIA REPAIR      JOINT REPLACEMENT      LEG SURGERY        Family History:     Family History   Problem Relation Age of Onset    Coronary artery disease Mother     Heart failure Mother     Coronary artery disease Father       Social History:     Social History     Socioeconomic History    Marital status:      Spouse name: None    Number of children: None    Years of education: None    Highest education level: None   Occupational History    None   Tobacco Use    Smoking status: Never Smoker    Smokeless tobacco: Never Used Substance and Sexual Activity    Alcohol use: No     Comment: occ    Drug use: No    Sexual activity: None   Other Topics Concern    None   Social History Narrative    None     Social Determinants of Health     Financial Resource Strain: Not on file   Food Insecurity: Not on file   Transportation Needs: Not on file   Physical Activity: Not on file   Stress: Not on file   Social Connections: Not on file   Intimate Partner Violence: Not on file   Housing Stability: Not on file      Current Medications:     Current Outpatient Medications   Medication Sig Dispense Refill    aspirin (ECOTRIN LOW STRENGTH) 81 mg EC tablet Take 81 mg by mouth daily      clotrimazole-betamethasone (LOTRISONE) 1-0 05 % cream Apply topically 2 (two) times a day 45 g 0    losartan-hydrochlorothiazide (HYZAAR) 100-12 5 MG per tablet TAKE ONE TABLET BY MOUTH EVERY DAY 90 tablet 0    montelukast (SINGULAIR) 10 mg tablet TAKE ONE TABLET BY MOUTH EVERY DAY 90 tablet 0    Multiple Vitamin (MULTIVITAMIN) tablet Take 1 tablet by mouth daily       No current facility-administered medications for this visit  Allergies: Allergies   Allergen Reactions    Epinephrine Other (See Comments)     "didn't feel right and had SOB for months"    Lidocaine      w/epi    Procaine       Physical Exam:     /82 (BP Location: Left arm, Patient Position: Sitting, Cuff Size: Adult)   Pulse 56   Temp 98 9 °F (37 2 °C) (Tympanic)   Resp 20   Ht 5' 2 75" (1 594 m)   Wt 90 9 kg (200 lb 6 4 oz)   SpO2 96%   BMI 35 78 kg/m²     Physical Exam  Vitals and nursing note reviewed  Constitutional:       Appearance: Normal appearance  He is well-developed and normal weight  HENT:      Head: Normocephalic and atraumatic  Right Ear: Tympanic membrane, ear canal and external ear normal  There is impacted cerumen  Left Ear: Tympanic membrane, ear canal and external ear normal  There is no impacted cerumen        Nose: Nose normal  Mouth/Throat:      Mouth: Mucous membranes are moist       Pharynx: Oropharynx is clear  Eyes:      Conjunctiva/sclera: Conjunctivae normal    Cardiovascular:      Rate and Rhythm: Normal rate and regular rhythm  Pulses: Normal pulses  Heart sounds: Normal heart sounds  No murmur heard  Pulmonary:      Effort: Pulmonary effort is normal  No respiratory distress  Breath sounds: Normal breath sounds  Abdominal:      General: Bowel sounds are normal  There is no distension  Palpations: Abdomen is soft  There is no mass  Tenderness: There is no abdominal tenderness  There is no guarding or rebound  Hernia: No hernia is present  Musculoskeletal:         General: Normal range of motion  Cervical back: Normal range of motion and neck supple  Skin:     General: Skin is warm and dry  Neurological:      General: No focal deficit present  Mental Status: He is alert and oriented to person, place, and time  Psychiatric:         Mood and Affect: Mood normal          Behavior: Behavior normal          Thought Content: Thought content normal          Judgment: Judgment normal         Ear cerumen removal    Date/Time: 8/23/2022 8:37 AM  Performed by: VIGNESH Richmond  Authorized by: VIGNESH Richmond   Universal Protocol:  Consent: Verbal consent obtained  Consent given by: patient  Patient understanding: patient states understanding of the procedure being performed      Patient location:  Clinic  Procedure details:     Local anesthetic:  None    Location:  R ear    Procedure type: irrigation with instrumentation      Instrumentation: curette      Approach:  External  Post-procedure details:     Complication:  None    Hearing quality:  Improved    Patient tolerance of procedure:   Tolerated well, no immediate complications  Comments:      Complete removal of impacted cerumen        S[place] Camera, [id number] [address]

## 2022-08-23 NOTE — PATIENT INSTRUCTIONS
DASH Eating Plan   WHAT YOU NEED TO KNOW:   The DASH (Dietary Approaches to Stop Hypertension) Eating Plan is designed to help prevent or lower high blood pressure  It can also help to lower LDL (bad) cholesterol and decrease your risk for heart disease  The plan is low in sodium, sugar, unhealthy fats, and total fat  It is high in potassium, calcium, magnesium, and fiber  These nutrients are added when you eat more fruits, vegetables, and whole grains  With the DASH eating plan, you need to eat a certain number of servings from each food group  This will help you get enough of certain nutrients and limit others  The amount of servings you should eat depends on how many calories you need  Your dietitian can help you create meal plans with the right number of servings for each food group  DISCHARGE INSTRUCTIONS:   What you need to know about sodium:  Your dietitian will tell you how much sodium is safe for you to have each day  People with high blood pressure should have no more than 1,500 to 2,300 mg of sodium in a day  A teaspoon (tsp) of salt has 2,300 mg of sodium  This may seem like a difficult goal, but small changes to the foods you eat can make a big difference  Your healthcare provider or dietitian can help you create a meal plan that follows your sodium limit  Read food labels  Food labels can help you choose foods that are low in sodium  The amount of sodium is listed in milligrams (mg)  The % Daily Value (DV) column tells you how much of your daily needs are met by 1 serving of the food for each nutrient listed  Choose foods that have less than 5% of the DV of sodium  These foods are considered low in sodium  Foods that have 20% or more of the DV of sodium are considered high in sodium  Avoid foods that have more than 300 mg of sodium in each serving  Choose foods that say low-sodium, reduced-sodium, or no salt added on the food label  Limit added salt    Do not salt food at the table if you add salt when you cook  Use herbs and spices, such as onions, garlic, and salt-free seasonings to add flavor  Try lemon or lime juice or vinegar to add a tart flavor  Use hot peppers or a small amount of hot pepper sauce to add a spicy flavor  Limit foods high in added salt, such as the following:    Seasonings made with salt, such as garlic salt, celery salt, onion salt, seasoned salt, meat tenderizers, and monosodium glutamate (MSG)    Miso soup and canned or dried soup mixes    Regular soy sauce, barbecue sauce, teriyaki sauce, steak sauce, Worcestershire sauce, and most flavored vinegars    Snack foods, such as salted chips, popcorn, pretzels, pork rinds, salted crackers, and salted nuts    Frozen foods, such as dinners, entrees, vegetables with sauces, and breaded meats    Ask about salt substitutes  Ask your healthcare provider if you may use salt substitutes  Some salt substitutes have ingredients that can be harmful if you have certain health conditions  Choose foods carefully at restaurants  Meals from restaurants, especially fast food restaurants, are often high in sodium  Some restaurants have nutrition information that tells you the amount of sodium in their foods  Ask to have your food prepared with less, or no salt  What you need to know about fats:  Healthy fats include unsaturated fats and omega-3 fatty acids  Unhealthy fats include saturated fats and trans fats    Include healthy fats, such as the following:      Cooking oils, such as soybean, canola, olive, or sunflower    Fatty fish, such as salmon, tuna, mackerel, or sardines    Flaxseed oil or ground flaxseed    ½ cup of cooked beans, such as black beans, kidney beans, or chappell beans    1½ ounces of low-sodium nuts, such as almonds or walnuts    Low-sugar, low-sodium peanut butter    Seeds such as edward seeds or sunflower seeds       Limit or do not have unhealthy fats, such as the following:      Foods that contain fat from animals, such as fatty meats, whole milk, butter, and cream    Shortening, stick margarine, palm oil, and coconut oil    Full-fat or creamy salad dressing    Creamy soup    Crackers, chips, and baked goods made with margarine or shortening    Foods that are fried in unhealthy fats    Gravy and sauces, such as Rangel or cheese sauces    What you need to know about carbohydrates (carbs): All carbs break down into sugar  Complex carbs contain more fiber than simple carbs  This means complex carbs go into the bloodstream more slowly and cause less of a blood sugar spike  Try to include more complex carbs and fewer simple carbs  Include complex carbs, such as the followin slice of whole-grain bread    1 ounce of dry cereal that does not contain added sugar    ½ cup of cooked oatmeal    2 ounces of cooked whole-grain pasta    ½ cup of cooked brown rice    Limit or do not have simple carbs, such as the following:      [employer], such as doughnuts, pastries, and cookies    Mixes for cornbread and biscuits    White rice and pasta mixes, such as boxed macaroni and cheese    Instant and cold cereals that contain sugar    Jelly, jam, and ice cream that contain sugar    Condiments such as ketchup    Drinks high in sugar, such as soft drinks, lemonade, and fruit juice    What you need to know about vegetables and fruits:  Vegetables and fruits can be fresh, frozen, or canned  If possible, try to choose low-sodium canned options    Include a variety of vegetables and fruits, such as the followin medium apple, pear, or peach (about ½ cup chopped)    ½ small banana    ½ cup berries, such as blueberries, strawberries, or blackberries    1 cup of raw leafy greens, such as lettuce, spinach, kale, or yosvany greens    ½ cup of frozen or canned (no added salt) vegetables, such as green beans    ½ cup of fresh, frozen, or canned fruit (canned in light syrup or fruit juice)    ½ cup of vegetable or fruit juice    Limit or do not have vegetables and fruits made in the following ways:      Frozen fruit such as cherries that have added sugar    Fruit in cream or butter sauce    Canned vegetables that are high in sodium    Sauerkraut, pickled vegetables, and other foods prepared in brine    Fried vegetables or vegetables in butter or high-fat sauces    What you need to know about protein foods: Include lean or low-fat protein foods, such as the following:      Poultry (chicken, turkey) with no skin    Fish (especially fatty fish, such as salmon, fresh tuna, or mackerel)    Lean beef and pork (loin, round, extra lean hamburger)    Egg whites and egg substitutes    1 cup of nonfat (skim) or 1% milk    1½ ounces of fat-free or low-fat cheese    6 ounces of nonfat or low-fat yogurt    Limit or do not have high-fat protein foods, such as the following:      Smoked or cured meat, such as corned beef, palmer, ham, hot dogs, and sausage    Canned beans and canned meats or spreads, such as potted meats, sardines, anchovies, and imitation seafood    Deli or lunch meats, such as bologna, ham, turkey, and roast beef    High-fat meat (T-bone steak, regular hamburger, and ribs)    Whole eggs and egg yolks    Whole milk, 2% milk, and cream    Regular cheese and processed cheese    Other guidelines to follow:   Maintain a healthy weight  Your risk for heart disease is higher if you are overweight  Your healthcare provider may suggest that you lose weight if you are overweight  You can lose weight by eating fewer calories and foods that have added sugars and fat  The DASH meal plan can help you do this  Decrease calories by eating smaller portions at each meal and fewer snacks  Ask your healthcare provider for more information about how to lose weight  Exercise regularly  Regular exercise can help you reach or maintain a healthy weight  Regular exercise can also help decrease your blood pressure and improve your cholesterol levels   Get 30 minutes or more of moderate exercise each day of the week  To lose weight, get at least 60 minutes of exercise  Talk to your healthcare provider about the best exercise program for you  Limit alcohol  Women should limit alcohol to 1 drink a day  Men should limit alcohol to 2 drinks a day  A drink of alcohol is 12 ounces of beer, 5 ounces of wine, or 1½ ounces of liquor  For more information:   [facility] [address]  [address] [address]  [place] [address]  Phone: [phone] - [phone]  Web Address: [facility] no    © [address] [facility] 2022 Information is for End User's use only and may not be sold, redistributed or otherwise used for commercial purposes  All illustrations and images included in CareNotes® are the copyrighted property of A D A M , Inc  or [facility] Bryant Griggs   The above information is an  only  It is not intended as medical advice for individual conditions or treatments  Talk to your doctor, nurse or pharmacist before following any medical regimen to see if it is safe and effective for you  Hypertension   WHAT YOU NEED TO KNOW:   Hypertension is high blood pressure  Your blood pressure is the force of your blood moving against the walls of your arteries  Hypertension causes your blood pressure to get so high that your heart has to work much harder than normal  This can damage your heart  The cause of hypertension may not be known  This is called essential or primary hypertension  Hypertension caused by another medical condition, such as kidney disease, is called secondary hypertension  DISCHARGE INSTRUCTIONS:   Call your local emergency number (911 in the [address] [place] [address],[address]) or have someone call if:   You have chest pain      You have any of the following signs of a heart attack:      Squeezing, pressure, or pain in your chest    You may  also have any of the following:     Discomfort or pain in your back, neck, jaw, stomach, or arm    Shortness of breath    Nausea or vomiting    Lightheadedness or a sudden cold sweat    You become confused or have trouble speaking  You suddenly feel lightheaded or have trouble breathing  Return to the emergency department if:   You have a severe headache or vision loss  You have weakness in an arm or leg  Call your doctor or cardiologist if:   You feel faint, dizzy, confused, or drowsy  You have been taking your blood pressure medicine but your pressure is higher than your provider says it should be  You have questions or concerns about your condition or care  Medicines: You may  need any of the following:  Antihypertensives  may be used to help lower your blood pressure  Several kinds of medicines are available  Your healthcare provider will choose medicines based on the kind of hypertension you have  You may need more than one type of medicine  Take the medicine exactly as directed  Diuretics  help decrease extra fluid that collects in your body  This will help lower your blood pressure  You may urinate more often while you take this medicine  Cholesterol medicine  helps lower your cholesterol level  A low cholesterol level helps prevent heart disease and makes it easier to control your blood pressure  Take your medicine as directed  Contact your healthcare provider if you think your medicine is not helping or if you have side effects  Tell him or her if you are allergic to any medicine  Keep a list of the medicines, vitamins, and herbs you take  Include the amounts, and when and why you take them  Bring the list or the pill bottles to follow-up visits  Carry your medicine list with you in case of an emergency  Follow up with your doctor or cardiologist as directed: You will need to return to have your blood pressure checked and to have other lab tests done  Write down your questions so you remember to ask them during your visits    Stages of hypertension: Normal blood pressure is 119/79 or lower   Your healthcare provider may only check your blood pressure each year if it stays at a normal level  Elevated blood pressure is 120/79 to 129/79   This is sometimes called prehypertension  Your healthcare provider may suggest lifestyle changes to help lower your blood pressure to a normal level  He or she may then check it again in 3 to 6 months  Stage 1 hypertension is 130/80  to 139/89   Your provider may recommend lifestyle changes, medication, and checks every 3 to 6 months until your blood pressure is controlled  Stage 2 hypertension is 140/90 or higher   Your provider will recommend lifestyle changes and have you take 2 kinds of hypertension medicines  You will also need to have your blood pressure checked monthly until it is controlled  Manage hypertension:   Check your blood pressure at home  Avoid smoking, caffeine, and exercise at least 30 minutes before checking your blood pressure  Sit and rest for 5 minutes before you take your blood pressure  Extend your arm and support it on a flat surface  Your arm should be at the same level as your heart  Follow the directions that came with your blood pressure monitor  Check your blood pressure 2 times, 1 minute apart, before you take your medicine in the morning  Also check your blood pressure before your evening meal  Keep a record of your readings and bring it to your follow-up visits  Ask your healthcare provider what your blood pressure should be  Manage any other health conditions you have  Health conditions such as diabetes can increase your risk for hypertension  Follow your healthcare provider's instructions and take all your medicines as directed  Ask about all medicines  Certain medicines can increase your blood pressure  Examples include oral birth control pills, decongestants, herbal supplements, and NSAIDs, such as ibuprofen   Your healthcare provider can tell you which medicines are safe for you to take  This includes prescription and over-the-counter medicines  Lifestyle changes you can make to manage hypertension:  Your healthcare provider may recommend you work with a team to manage hypertension  The team may include medical experts such as a dietitian, an exercise or physical therapist, and a behavior therapist  Your family members may be included in helping you create lifestyle changes  Limit sodium (salt) as directed  Too much sodium can affect your fluid balance  Check labels to find low-sodium or no-salt-added foods  Some low-sodium foods use potassium salts for flavor  Too much potassium can also cause health problems  Your healthcare provider will tell you how much sodium and potassium are safe for you to have in a day  He or she may recommend that you limit sodium to 2,300 mg a day  Follow the meal plan recommended by your healthcare provider  A dietitian or your provider can give you more information on low-sodium plans or the DASH (Dietary Approaches to Stop Hypertension) eating plan  The DASH plan is low in sodium, processed sugar, unhealthy fats, and total fat  It is high in potassium, calcium, and fiber  These can be found in vegetables, fruit, and whole-grain foods  Be physically active throughout the day  Physical activity, such as exercise, can help control your blood pressure and your weight  Be physically active for at least 30 minutes per day, on most days of the week  Include aerobic activity, such as walking or riding a bicycle  Also include strength training at least 2 times each week  Your healthcare providers can help you create a physical activity plan  Decrease stress  This may help lower your blood pressure  Learn ways to relax, such as deep breathing or listening to music  Limit alcohol as directed  Alcohol can increase your blood pressure   A drink of alcohol is 12 ounces of beer, 5 ounces of wine, or 1½ ounces of liquor  Do not smoke  Nicotine and other chemicals in cigarettes and cigars can increase your blood pressure and also cause lung damage  Ask your healthcare provider for information if you currently smoke and need help to quit  E-cigarettes or smokeless tobacco still contain nicotine  Talk to your healthcare provider before you use these products  © Copyright [employer] 2022 Information is for End User's use only and may not be sold, redistributed or otherwise used for commercial purposes  All illustrations and images included in CareNotes® are the copyrighted property of [employer]  or Leanne Griggs   The above information is an  only  It is not intended as medical advice for individual conditions or treatments  Talk to your doctor, nurse or pharmacist before following any medical regimen to see if it is safe and effective for you  Obesity   AMBULATORY CARE:   Obesity  means your body mass index (BMI) is greater than 30  Your healthcare provider will use your height and weight to measure your BMI  The risks of obesity include  many health problems, including injuries or physical disability  Diabetes (high blood sugar level)    High blood pressure or high cholesterol    Heart disease    Stroke    Gallbladder or liver disease    Cancer of the colon, breast, prostate, liver, or kidney    Sleep apnea    Arthritis or gout    Screening  is done to check for health conditions before you have signs or symptoms  If you are 28to 79years old, your blood sugar level may be checked every 3 years for signs of prediabetes or diabetes  Your healthcare provider will check your blood pressure at each visit  High blood pressure can lead to a stroke or other problems  Your provider may check for signs of heart disease, cancer, or other health problems  Seek care immediately if:   You have a severe headache, confusion, or difficulty speaking      You have weakness on one side of your body     You have chest pain, sweating, or shortness of breath  Call your doctor if:   You have symptoms of gallbladder or liver disease, such as pain in your upper abdomen  You have knee or hip pain and discomfort while walking  You have symptoms of diabetes, such as intense hunger and thirst, and frequent urination  You have symptoms of sleep apnea, such as snoring or daytime sleepiness  You have questions or concerns about your condition or care  Treatment for obesity  focuses on helping you lose weight to improve your health  Even a small decrease in BMI can reduce the risk for many health problems  Your healthcare provider will help you set a weight-loss goal   Lifestyle changes  are the first step in treating obesity  These include making healthy food choices and getting regular physical activity  Your healthcare provider may suggest a weight-loss program that involves coaching, education, and therapy  Medicine  may help you lose weight when it is used with a healthy foods and physical activity  Surgery  can help you lose weight if you are very obese and have other health problems  There are several types of weight-loss surgery  Ask your healthcare provider for more information  Tips for safe weight loss:   Set small, realistic goals  An example of a small goal is to walk for 20 minutes 5 days a week  Albin goal is to lose 5% of your body weight  Tell friends, family members, and coworkers about your goals  and ask for their support  Ask a friend to lose weight with you, or join a weight-loss support group  Identify foods or triggers that may cause you to overeat , and find ways to avoid them  Remove tempting high-calorie foods from your home and workplace  Place a bowl of fresh fruit on your kitchen counter  If stress causes you to eat, then find other ways to cope with stress  A counselor or therapist may be able to help you  Keep a diary to track what you eat and drink  Also write down how many minutes of physical activity you do each day  Weigh yourself once a week and record it in your diary  Eating changes: You will need to eat 500 to 1,000 fewer calories each day than you currently eat to lose 1 to 2 pounds a week  The following changes will help you cut calories:  Eat smaller portions  Use small plates, no larger than 9 inches in diameter  Fill your plate half full of fruits and vegetables  Measure your food using measuring cups until you know what a serving size looks like  Eat 3 meals and 1 or 2 snacks each day  Plan your meals in advance  Marie Foil and eat at home most of the time  Eat slowly  Do not skip meals  Skipping meals can lead to overeating later in the day  This can make it harder for you to lose weight  Talk with a dietitian to help you make a meal plan and schedule that is right for you  Eat fruits and vegetables at every meal   They are low in calories and high in fiber, which makes you feel full  Do not add butter, margarine, or cream sauce to vegetables  Use herbs to season steamed vegetables  Eat less fat and fewer fried foods  Eat more baked or grilled chicken and fish  These protein sources are lower in calories and fat than red meat  Limit fast food  Dress your salads with olive oil and vinegar instead of bottled dressing  Limit the amount of sugar you eat  Do not drink sugary beverages  Limit alcohol  Activity changes:  Physical activity is good for your body in many ways  It helps you burn calories and build strong muscles  It decreases stress and depression, and improves your mood  It can also help you sleep better  Talk to your healthcare provider before you begin an exercise program   Exercise for at least 30 minutes 5 days a week  Start slowly  Set aside time each day for physical activity that you enjoy and that is convenient for you   It is best to do both weight training and an activity that increases your heart rate, such as walking, bicycling, or swimming  Find ways to be more active  Do yard work and housecleaning  Walk up the stairs instead of using elevators  Spend your leisure time going to events that require walking, such as outdoor festivals or fairs  This extra physical activity can help you lose weight and keep it off  Follow up with your doctor as directed: You may need to meet with a dietitian  Write down your questions so you remember to ask them during your visits  © Copyright [employer] 2022 Information is for End User's use only and may not be sold, redistributed or otherwise used for commercial purposes  All illustrations and images included in CareNotes® are the copyrighted property of [employer] M [employer] Inc  or Leanne Griggs   The above information is an  only  It is not intended as medical advice for individual conditions or treatments  Talk to your doctor, nurse or pharmacist before following any medical regimen to see if it is safe and effective for you  Cholesterol and Your Health   AMBULATORY CARE:   Cholesterol  is a waxy, fat-like substance  Your body uses cholesterol to make hormones and new cells, and to protect nerves  Cholesterol is made by your body  It also comes from certain foods you eat, such as meat and dairy products  Your healthcare provider can help you set goals for your cholesterol levels  He or she can help you create a plan to meet your goals  Cholesterol level goals: Your cholesterol level goals depend on your risk for heart disease, your age, and your other health conditions  The following are general guidelines: Total cholesterol  includes low-density lipoprotein (LDL), high-density lipoprotein (HDL), and triglyceride levels  The total cholesterol level should be lower than 200 mg/dL and is best at about 150 mg/dL  LDL cholesterol  is called bad cholesterol  because it forms plaque in your arteries   As plaque builds up, your arteries become narrow, and less blood flows through  When plaque decreases blood flow to your heart, you may have chest pain  If plaque completely blocks an artery that brings blood to your heart, you may have a heart attack  Plaque can break off and form blood clots  Blood clots may block arteries in your brain and cause a stroke  The level should be less than 130 mg/dL and is best at about 100 mg/dL  HDL cholesterol  is called good cholesterol  because it helps remove LDL cholesterol from your arteries  It does this by attaching to LDL cholesterol and carrying it to your liver  Your liver breaks down LDL cholesterol so your body can get rid of it  High levels of HDL cholesterol can help prevent a heart attack and stroke  Low levels of HDL cholesterol can increase your risk for heart disease, heart attack, and stroke  The level should be 60 mg/dL or higher  Triglycerides  are a type of fat that store energy from foods you eat  High levels of triglycerides also cause plaque buildup  This can increase your risk for a heart attack or stroke  If your triglyceride level is high, your LDL cholesterol level may also be high  The level should be less than 150 mg/dL  Any of the following can increase your risk for high cholesterol:   Smoking cigarettes    Being overweight or obese, or not getting enough exercise    Drinking large amounts of alcohol    A medical condition such as hypertension (high blood pressure) or diabetes    Certain genes passed from your parents to you    Age older than 65 years    What you need to know about having your cholesterol levels checked: Adults 21to 39years of age should have their cholesterol levels checked every 4 to 6 years  Adults 45 years or older should have their cholesterol checked every 1 to 2 years  You may need your cholesterol checked more often, or at a younger age, if you have risk factors for heart disease   You may also need to have your cholesterol checked more often if you have other health conditions, such as diabetes  Blood tests are used to check cholesterol levels  Blood tests measure your levels of triglycerides, LDL cholesterol, and HDL cholesterol  How healthy fats affect your cholesterol levels:  Healthy fats, also called unsaturated fats, help lower LDL cholesterol and triglyceride levels  Healthy fats include the following:  Monounsaturated fats  are found in foods such as olive oil, canola oil, avocado, nuts, and olives  Polyunsaturated fats,  such as omega 3 fats, are found in fish, such as salmon, trout, and tuna  They can also be found in plant foods such as flaxseed, walnuts, and soybeans  How unhealthy fats affect your cholesterol levels:  Unhealthy fats increase LDL cholesterol and triglyceride levels  They are found in foods high in cholesterol, saturated fat, and trans fat:  Cholesterol  is found in eggs, dairy, and meat  Saturated fat  is found in butter, cheese, ice cream, whole milk, and coconut oil  Saturated fat is also found in meat, such as sausage, hot dogs, and bologna  Trans fat  is found in liquid oils and is used in fried and baked foods  Foods that contain trans fats include chips, crackers, muffins, sweet rolls, microwave popcorn, and cookies  Treatment  for high cholesterol will also decrease your risk of heart disease, heart attack, and stroke  Treatment may include any of the following:  Lifestyle changes  may include food, exercise, weight loss, and quitting smoking  You may also need to decrease the amount of alcohol you drink  Your healthcare provider will want you to start with lifestyle changes  Other treatment may be added if lifestyle changes are not enough  Your healthcare provider may recommend you work with a team to manage hyperlipidemia   The team may include medical experts such as a dietitian, an exercise or physical therapist, and a behavior therapist  Your family members may be included in helping you create lifestyle changes  Medicines  may be given to lower your LDL cholesterol, triglyceride levels, or total cholesterol level  You may need medicines to lower your cholesterol if any of the following is true:    You have a history of stroke, TIA, unstable angina, or a heart attack  Your LDL cholesterol level is 190 mg/dL or higher  You are age 36 to 76 years, have diabetes or heart disease risk factors, and your LDL cholesterol is 70 mg/dL or higher  Supplements  include fish oil, red yeast rice, and garlic  Fish oil may help lower your triglyceride and LDL cholesterol levels  It may also increase your HDL cholesterol level  Red yeast rice may help decrease your total cholesterol level and LDL cholesterol level  Garlic may help lower your total cholesterol level  Do not take any supplements without talking to your healthcare provider  Food changes you can make to lower your cholesterol levels:  A dietitian can help you create a healthy eating plan  He or she can show you how to read food labels and choose foods low in saturated fat, trans fats, and cholesterol  Decrease the total amount of fat you eat  Choose lean meats, fat-free or 1% fat milk, and low-fat dairy products, such as yogurt and cheese  Try to limit or avoid red meats  Limit or do not eat fried foods or baked goods, such as cookies  Replace unhealthy fats with healthy fats  Cook foods in olive oil or canola oil  Choose soft margarines that are low in saturated fat and trans fat  Seeds, nuts, and avocados are other examples of healthy fats  Eat foods with omega-3 fats  Examples include salmon, tuna, mackerel, walnuts, and flaxseed  Eat fish 2 times per week  Pregnant women should not eat fish that have high levels of mercury, such as shark, swordfish, and flor mackerel  Increase the amount of high-fiber foods you eat  High-fiber foods can help lower your LDL cholesterol  Aim to get between 20 and 30 grams of fiber each day   Fruits and vegetables are high in fiber  Eat at least 5 servings each day  Other high-fiber foods are whole-grain or whole-wheat breads, pastas, or cereals, and brown rice  Eat 3 ounces of whole-grain foods each day  Increase fiber slowly  You may have abdominal discomfort, bloating, and gas if you add fiber to your diet too quickly  Eat healthy protein foods  Examples include low-fat dairy products, skinless chicken and turkey, fish, and nuts  Limit foods and drinks that are high in sugar  Your dietitian or healthcare provider can help you create daily limits for high-sugar foods and drinks  The limit may be lower if you have diabetes or another health condition  Limits can also help you lose weight if needed  Lifestyle changes you can make to lower your cholesterol levels:   Maintain a healthy weight  Ask your healthcare provider what a healthy weight is for you  Ask him or her to help you create a weight loss plan if needed  Weight loss can decrease your total cholesterol and triglyceride levels  Weight loss may also help keep your blood pressure at a healthy level  Be physically active throughout the day  Physical activity, such as exercise, can help lower your total cholesterol level and maintain a healthy weight  Physical activity can also help increase your HDL cholesterol level  Work with your healthcare provider to create an program that is right for you  Get at least 30 to 40 minutes of moderate physical activity most days of the week  Examples of exercise include brisk walking, swimming, or biking  Also include strength training at least 2 times each week  Your healthcare providers can help you create a physical activity plan  Do not smoke  Nicotine and other chemicals in cigarettes and cigars can raise your cholesterol levels  Ask your healthcare provider for information if you currently smoke and need help to quit  E-cigarettes or smokeless tobacco still contain nicotine   Talk to your healthcare provider before you use these products  Limit or do not drink alcohol  Alcohol can increase your triglyceride levels  Ask your healthcare provider before you drink alcohol  Ask how much is okay for you to drink in 24 hours or 1 week  Follow up with your doctor as directed:  Write down your questions so you remember to ask them during your visits  © Copyright [employer] 2022 Information is for End User's use only and may not be sold, redistributed or otherwise used for commercial purposes  All illustrations and images included in CareNotes® are the copyrighted property of A D A M , Inc  or [facility] [employer]   The above information is an  only  It is not intended as medical advice for individual conditions or treatments  Talk to your doctor, nurse or pharmacist before following any medical regimen to see if it is safe and effective for you  Wellness Visit for Adults   AMBULATORY CARE:   A wellness visit  is when you see your healthcare provider to get screened for health problems  Your healthcare provider will also give you advice on how to stay healthy  Write down your questions so you remember to ask them  Ask your healthcare provider how often you should have a wellness visit  What happens at a wellness visit:  Your healthcare provider will ask about your health, and your family history of health problems  This includes high blood pressure, heart disease, and cancer  He or she will ask if you have symptoms that concern you, if you smoke, and about your mood  You may also be asked about your intake of medicines, supplements, food, and alcohol  Any of the following may be done: Your weight  will be checked  Your height may also be checked so your body mass index (BMI) can be calculated  Your BMI shows if you are at a healthy weight  Your blood pressure  and heart rate will be checked  Your temperature may also be checked      Blood and urine tests  may be done  Blood tests may be done to check your cholesterol levels  Abnormal cholesterol levels increase your risk for heart disease and stroke  You may also need a blood or urine test to check for diabetes if you are at increased risk  Urine tests may be done to look for signs of an infection or kidney disease  A physical exam  includes checking your heartbeat and lungs with a stethoscope  Your healthcare provider may also check your skin to look for sun damage  Screening tests  may be recommended  A screening test is done to check for diseases that may not cause symptoms  The screening tests you may need depend on your age, gender, family history, and lifestyle habits  For example, colorectal screening may be recommended if you are 48years old or older  Screening tests you need if you are a woman:   A Pap smear  is used to screen for cervical cancer  Pap smears are usually done every 3 to 5 years depending on your age  You may need them more often if you have had abnormal Pap smear test results in the past  Ask your healthcare provider how often you should have a Pap smear  A mammogram  is an x-ray of your breasts to screen for breast cancer  Experts recommend mammograms every 2 years starting at age 48 years  You may need a mammogram at age 52 years or younger if you have an increased risk for breast cancer  Talk to your healthcare provider about when you should start having mammograms and how often you need them  Vaccines you may need:   Get an influenza vaccine  every year  The influenza vaccine protects you from the flu  Several types of viruses cause the flu  The viruses change over time, so new vaccines are made each year  Get a tetanus-diphtheria (Td) booster vaccine  every 10 years  This vaccine protects you against tetanus and diphtheria  Tetanus is a severe infection that may cause painful muscle spasms and lockjaw   Diphtheria is a severe bacterial infection that causes a thick covering in the back of your mouth and throat  Get a human papillomavirus (HPV) vaccine  if you are female and aged 23 to 32 or male 23 to 24 and never received it  This vaccine protects you from HPV infection  HPV is the most common infection spread by sexual contact  HPV may also cause vaginal, penile, and anal cancers  Get a pneumococcal vaccine  if you are aged 72 years or older  The pneumococcal vaccine is an injection given to protect you from pneumococcal disease  Pneumococcal disease is an infection caused by pneumococcal bacteria  The infection may cause pneumonia, meningitis, or an ear infection  Get a shingles vaccine  if you are 60 or older, even if you have had shingles before  The shingles vaccine is an injection to protect you from the varicella-zoster virus  This is the same virus that causes chickenpox  Shingles is a painful rash that develops in people who had chickenpox or have been exposed to the virus  How to eat healthy:  My Plate is a model for planning healthy meals  It shows the types and amounts of foods that should go on your plate  Fruits and vegetables make up about half of your plate, and grains and protein make up the other half  A serving of dairy is included on the side of your plate  The amount of calories and serving sizes you need depends on your age, gender, weight, and height  Examples of healthy foods are listed below:  Eat a variety of vegetables  such as dark green, red, and orange vegetables  You can also include canned vegetables low in sodium (salt) and frozen vegetables without added butter or sauces  Eat a variety of fresh fruits , canned fruit in 100% juice, frozen fruit, and dried fruit  Include whole grains  At least half of the grains you eat should be whole grains   Examples include whole-wheat bread, wheat pasta, brown rice, and whole-grain cereals such as oatmeal     Eat a variety of protein foods such as seafood (fish and shellfish), lean meat, and poultry without skin (turkey and chicken)  Examples of lean meats include pork leg, shoulder, or tenderloin, and beef round, sirloin, tenderloin, and extra lean ground beef  Other protein foods include eggs and egg substitutes, beans, peas, soy products, nuts, and seeds  Choose low-fat dairy products such as skim or 1% milk or low-fat yogurt, cheese, and cottage cheese  Limit unhealthy fats  such as butter, hard margarine, and shortening  Exercise:  Exercise at least 30 minutes per day on most days of the week  Some examples of exercise include walking, biking, dancing, and swimming  You can also fit in more physical activity by taking the stairs instead of the elevator or parking farther away from stores  Include muscle strengthening activities 2 days each week  Regular exercise provides many health benefits  It helps you manage your weight, and decreases your risk for type 2 diabetes, heart disease, stroke, and high blood pressure  Exercise can also help improve your mood  Ask your healthcare provider about the best exercise plan for you  General health and safety guidelines:   Do not smoke  Nicotine and other chemicals in cigarettes and cigars can cause lung damage  Ask your healthcare provider for information if you currently smoke and need help to quit  E-cigarettes or smokeless tobacco still contain nicotine  Talk to your healthcare provider before you use these products  Limit alcohol  A drink of alcohol is 12 ounces of beer, 5 ounces of wine, or 1½ ounces of liquor  Lose weight, if needed  Being overweight increases your risk of certain health conditions  These include heart disease, high blood pressure, type 2 diabetes, and certain types of cancer  Protect your skin  Do not sunbathe or use tanning beds  Use sunscreen with a SPF 15 or higher  Apply sunscreen at least 15 minutes before you go outside  Reapply sunscreen every 2 hours   Wear protective clothing, hats, and sunglasses when you are outside  Drive safely  Always wear your seatbelt  Make sure everyone in your car wears a seatbelt  A seatbelt can save your life if you are in an accident  Do not use your cell phone when you are driving  This could distract you and cause an accident  Pull over if you need to make a call or send a text message  Practice safe sex  Use latex condoms if are sexually active and have more than one partner  Your healthcare provider may recommend screening tests for sexually transmitted infections (STIs)  Wear helmets, lifejackets, and protective gear  Always wear a helmet when you ride a bike or motorcycle, go skiing, or play sports that could cause a head injury  Wear protective equipment when you play sports  Wear a lifejacket when you are on a boat or doing water sports  © Copyright [employer] 2022 Information is for End User's use only and may not be sold, redistributed or otherwise used for commercial purposes  All illustrations and images included in CareNotes® are the copyrighted property of A D A M , Inc  or [facility] [employer]   The above information is an  only  It is not intended as medical advice for individual conditions or treatments  Talk to your doctor, nurse or pharmacist before following any medical regimen to see if it is safe and effective for you  Weight Management   AMBULATORY CARE:   Why it is important to manage your weight:  Being overweight increases your risk of health conditions such as heart disease, high blood pressure, type 2 diabetes, and certain types of cancer  It can also increase your risk for osteoarthritis, sleep apnea, and other respiratory problems  Aim for a slow, steady weight loss  Even a small amount of weight loss can lower your risk of health problems    Risks of being overweight:  Extra weight can cause many health problems, including the following:  Diabetes (high blood sugar level)    High blood pressure or high cholesterol    Heart disease    Stroke    Gallbladder or liver disease    Cancer of the colon, breast, prostate, liver, or kidney    Sleep apnea    Arthritis or gout    Screening  is done to check for health conditions before you have signs or symptoms  If you are 28to 79years old, your blood sugar level may be checked every 3 years for signs of prediabetes or diabetes  Your healthcare provider will check your blood pressure at each visit  High blood pressure can lead to a stroke or other problems  Your provider may check for signs of heart disease, cancer, or other health problems  How to lose weight safely:  A safe and healthy way to lose weight is to eat fewer calories and get regular exercise  You can lose up about 1 pound a week by decreasing the number of calories you eat by 500 calories each day  You can decrease calories by eating smaller portion sizes or by cutting out high-calorie foods  Read labels to find out how many calories are in the foods you eat  You can also burn calories with exercise such as walking, swimming, or biking  You will be more likely to keep weight off if you make these changes part of your lifestyle  Exercise at least 30 minutes per day on most days of the week  You can also fit in more physical activity by taking the stairs instead of the elevator or parking farther away from stores  Ask your healthcare provider about the best exercise plan for you  Healthy meal plan for weight management:  A healthy meal plan includes a variety of foods, contains fewer calories, and helps you stay healthy  A healthy meal plan includes the following:     Eat whole-grain foods more often  A healthy meal plan should contain fiber  Fiber is the part of grains, fruits, and vegetables that is not broken down by your body  Whole-grain foods are healthy and provide extra fiber in your diet  Some examples of whole-grain foods are whole-wheat breads and pastas, oatmeal, brown rice, and bulgur      Eat a variety of vegetables every day  Include dark, leafy greens such as spinach, kale, yosvany greens, and mustard greens  Eat yellow and orange vegetables such as carrots, sweet potatoes, and winter squash  Eat a variety of fruits every day  Choose fresh or canned fruit (canned in its own juice or light syrup) instead of juice  Fruit juice has very little or no fiber  Eat low-fat dairy foods  Drink fat-free (skim) milk or 1% milk  Eat fat-free yogurt and low-fat cottage cheese  Try low-fat cheeses such as mozzarella and other reduced-fat cheeses  Choose meat and other protein foods that are low in fat  Choose beans or other legumes such as split peas or lentils  Choose fish, skinless poultry (chicken or turkey), or lean cuts of red meat (beef or pork)  Before you cook meat or poultry, cut off any visible fat  Use less fat and oil  Try baking foods instead of frying them  Add less fat, such as margarine, sour cream, regular salad dressing and mayonnaise to foods  Eat fewer high-fat foods  Some examples of high-fat foods include french fries, doughnuts, ice cream, and cakes  Eat fewer sweets  Limit foods and drinks that are high in sugar  This includes candy, cookies, regular soda, and sweetened drinks  Ways to decrease calories:   Eat smaller portions  Use a small plate with smaller servings  Do not eat second helpings  When you eat at a restaurant, ask for a box and place half of your meal in the box before you eat  Share an entrée with someone else  Replace high-calorie snacks with healthy, low-calorie snacks  Choose fresh fruit, vegetables, fat-free rice cakes, or air-popped popcorn instead of potato chips, nuts, or chocolate  Choose water or calorie-free drinks instead of soda or sweetened drinks  Do not shop for groceries when you are hungry  You may be more likely to make unhealthy food choices  Take a grocery list of healthy foods and shop after you have eaten      Eat regular meals  Do not skip meals  Skipping meals can lead to overeating later in the day  This can make it harder for you to lose weight  Eat a healthy snack in place of a meal if you do not have time to eat a regular meal  Talk with a dietitian to help you create a meal plan and schedule that is right for you  Other things to consider as you try to lose weight:   Be aware of situations that may give you the urge to overeat, such as eating while watching television  Find ways to avoid these situations  For example, read a book, go for a walk, or do crafts  Meet with a weight loss support group or friends who are also trying to lose weight  This may help you stay motivated to continue working on your weight loss goals  © Copyright [employer] 2022 Information is for End User's use only and may not be sold, redistributed or otherwise used for commercial purposes  All illustrations and images included in CareNotes® are the copyrighted property of [employer] A M , Inc  or [facility] [employer]   The above information is an  only  It is not intended as medical advice for individual conditions or treatments  Talk to your doctor, nurse or pharmacist before following any medical regimen to see if it is safe and effective for you  Obesity   AMBULATORY CARE:   Obesity  means your body mass index (BMI) is greater than 30  Your healthcare provider will use your height and weight to measure your BMI  The risks of obesity include  many health problems, including injuries or physical disability  Diabetes (high blood sugar level)    High blood pressure or high cholesterol    Heart disease    Stroke    Gallbladder or liver disease    Cancer of the colon, breast, prostate, liver, or kidney    Sleep apnea    Arthritis or gout    Screening  is done to check for health conditions before you have signs or symptoms   If you are 28to 79years old, your blood sugar level may be checked every 3 years for signs of prediabetes or diabetes  Your healthcare provider will check your blood pressure at each visit  High blood pressure can lead to a stroke or other problems  Your provider may check for signs of heart disease, cancer, or other health problems  Seek care immediately if:   You have a severe headache, confusion, or difficulty speaking  You have weakness on one side of your body  You have chest pain, sweating, or shortness of breath  Call your doctor if:   You have symptoms of gallbladder or liver disease, such as pain in your upper abdomen  You have knee or hip pain and discomfort while walking  You have symptoms of diabetes, such as intense hunger and thirst, and frequent urination  You have symptoms of sleep apnea, such as snoring or daytime sleepiness  You have questions or concerns about your condition or care  Treatment for obesity  focuses on helping you lose weight to improve your health  Even a small decrease in BMI can reduce the risk for many health problems  Your healthcare provider will help you set a weight-loss goal   Lifestyle changes  are the first step in treating obesity  These include making healthy food choices and getting regular physical activity  Your healthcare provider may suggest a weight-loss program that involves coaching, education, and therapy  Medicine  may help you lose weight when it is used with a healthy foods and physical activity  Surgery  can help you lose weight if you are very obese and have other health problems  There are several types of weight-loss surgery  Ask your healthcare provider for more information  Tips for safe weight loss:   Set small, realistic goals  An example of a small goal is to walk for 20 minutes 5 days a week  Albin goal is to lose 5% of your body weight  Tell friends, family members, and coworkers about your goals  and ask for their support   Ask a friend to lose weight with you, or join a weight-loss support group  Identify foods or triggers that may cause you to overeat , and find ways to avoid them  Remove tempting high-calorie foods from your home and workplace  Place a bowl of fresh fruit on your kitchen counter  If stress causes you to eat, then find other ways to cope with stress  A counselor or therapist may be able to help you  Keep a diary to track what you eat and drink  Also write down how many minutes of physical activity you do each day  Weigh yourself once a week and record it in your diary  Eating changes: You will need to eat 500 to 1,000 fewer calories each day than you currently eat to lose 1 to 2 pounds a week  The following changes will help you cut calories:  Eat smaller portions  Use small plates, no larger than 9 inches in diameter  Fill your plate half full of fruits and vegetables  Measure your food using measuring cups until you know what a serving size looks like  Eat 3 meals and 1 or 2 snacks each day  Plan your meals in advance  Erica Franklin and eat at home most of the time  Eat slowly  Do not skip meals  Skipping meals can lead to overeating later in the day  This can make it harder for you to lose weight  Talk with a dietitian to help you make a meal plan and schedule that is right for you  Eat fruits and vegetables at every meal   They are low in calories and high in fiber, which makes you feel full  Do not add butter, margarine, or cream sauce to vegetables  Use herbs to season steamed vegetables  Eat less fat and fewer fried foods  Eat more baked or grilled chicken and fish  These protein sources are lower in calories and fat than red meat  Limit fast food  Dress your salads with olive oil and vinegar instead of bottled dressing  Limit the amount of sugar you eat  Do not drink sugary beverages  Limit alcohol  Activity changes:  Physical activity is good for your body in many ways  It helps you burn calories and build strong muscles   It decreases stress and depression, and improves your mood  It can also help you sleep better  Talk to your healthcare provider before you begin an exercise program   Exercise for at least 30 minutes 5 days a week  Start slowly  Set aside time each day for physical activity that you enjoy and that is convenient for you  It is best to do both weight training and an activity that increases your heart rate, such as walking, bicycling, or swimming  Find ways to be more active  Do yard work and housecleaning  Walk up the stairs instead of using elevators  Spend your leisure time going to events that require walking, such as outdoor festivals or fairs  This extra physical activity can help you lose weight and keep it off  Follow up with your doctor as directed: You may need to meet with a dietitian  Write down your questions so you remember to ask them during your visits  © Copyright [employer] 2022 Information is for End User's use only and may not be sold, redistributed or otherwise used for commercial purposes  All illustrations and images included in CareNotes® are the copyrighted property of A D A M , Inc  or [facility] [employer]   The above information is an  only  It is not intended as medical advice for individual conditions or treatments  Talk to your doctor, nurse or pharmacist before following any medical regimen to see if it is safe and effective for you  Cholesterol and Your Health   AMBULATORY CARE:   Cholesterol  is a waxy, fat-like substance  Your body uses cholesterol to make hormones and new cells, and to protect nerves  Cholesterol is made by your body  It also comes from certain foods you eat, such as meat and dairy products  Your healthcare provider can help you set goals for your cholesterol levels  He or she can help you create a plan to meet your goals  Cholesterol level goals:   Your cholesterol level goals depend on your risk for heart disease, your age, and your other health conditions  The following are general guidelines: Total cholesterol  includes low-density lipoprotein (LDL), high-density lipoprotein (HDL), and triglyceride levels  The total cholesterol level should be lower than 200 mg/dL and is best at about 150 mg/dL  LDL cholesterol  is called bad cholesterol  because it forms plaque in your arteries  As plaque builds up, your arteries become narrow, and less blood flows through  When plaque decreases blood flow to your heart, you may have chest pain  If plaque completely blocks an artery that brings blood to your heart, you may have a heart attack  Plaque can break off and form blood clots  Blood clots may block arteries in your brain and cause a stroke  The level should be less than 130 mg/dL and is best at about 100 mg/dL  HDL cholesterol  is called good cholesterol  because it helps remove LDL cholesterol from your arteries  It does this by attaching to LDL cholesterol and carrying it to your liver  Your liver breaks down LDL cholesterol so your body can get rid of it  High levels of HDL cholesterol can help prevent a heart attack and stroke  Low levels of HDL cholesterol can increase your risk for heart disease, heart attack, and stroke  The level should be 60 mg/dL or higher  Triglycerides  are a type of fat that store energy from foods you eat  High levels of triglycerides also cause plaque buildup  This can increase your risk for a heart attack or stroke  If your triglyceride level is high, your LDL cholesterol level may also be high  The level should be less than 150 mg/dL      Any of the following can increase your risk for high cholesterol:   Smoking cigarettes    Being overweight or obese, or not getting enough exercise    Drinking large amounts of alcohol    A medical condition such as hypertension (high blood pressure) or diabetes    Certain genes passed from your parents to you    Age older than 72 years    What you need to know about having your cholesterol levels checked: Adults 21to 39years of age should have their cholesterol levels checked every 4 to 6 years  Adults 45 years or older should have their cholesterol checked every 1 to 2 years  You may need your cholesterol checked more often, or at a younger age, if you have risk factors for heart disease  You may also need to have your cholesterol checked more often if you have other health conditions, such as diabetes  Blood tests are used to check cholesterol levels  Blood tests measure your levels of triglycerides, LDL cholesterol, and HDL cholesterol  How healthy fats affect your cholesterol levels:  Healthy fats, also called unsaturated fats, help lower LDL cholesterol and triglyceride levels  Healthy fats include the following:  Monounsaturated fats  are found in foods such as olive oil, canola oil, avocado, nuts, and olives  Polyunsaturated fats,  such as omega 3 fats, are found in fish, such as salmon, trout, and tuna  They can also be found in plant foods such as flaxseed, walnuts, and soybeans  How unhealthy fats affect your cholesterol levels:  Unhealthy fats increase LDL cholesterol and triglyceride levels  They are found in foods high in cholesterol, saturated fat, and trans fat:  Cholesterol  is found in eggs, dairy, and meat  Saturated fat  is found in butter, cheese, ice cream, whole milk, and coconut oil  Saturated fat is also found in meat, such as sausage, hot dogs, and bologna  Trans fat  is found in liquid oils and is used in fried and baked foods  Foods that contain trans fats include chips, crackers, muffins, sweet rolls, microwave popcorn, and cookies  Treatment  for high cholesterol will also decrease your risk of heart disease, heart attack, and stroke  Treatment may include any of the following:  Lifestyle changes  may include food, exercise, weight loss, and quitting smoking  You may also need to decrease the amount of alcohol you drink   Your healthcare provider will want you to start with lifestyle changes  Other treatment may be added if lifestyle changes are not enough  Your healthcare provider may recommend you work with a team to manage hyperlipidemia  The team may include medical experts such as a dietitian, an exercise or physical therapist, and a behavior therapist  Your family members may be included in helping you create lifestyle changes  Medicines  may be given to lower your LDL cholesterol, triglyceride levels, or total cholesterol level  You may need medicines to lower your cholesterol if any of the following is true:    You have a history of stroke, TIA, unstable angina, or a heart attack  Your LDL cholesterol level is 190 mg/dL or higher  You are age 36 to 76 years, have diabetes or heart disease risk factors, and your LDL cholesterol is 70 mg/dL or higher  Supplements  include fish oil, red yeast rice, and garlic  Fish oil may help lower your triglyceride and LDL cholesterol levels  It may also increase your HDL cholesterol level  Red yeast rice may help decrease your total cholesterol level and LDL cholesterol level  Garlic may help lower your total cholesterol level  Do not take any supplements without talking to your healthcare provider  Food changes you can make to lower your cholesterol levels:  A dietitian can help you create a healthy eating plan  He or she can show you how to read food labels and choose foods low in saturated fat, trans fats, and cholesterol  Decrease the total amount of fat you eat  Choose lean meats, fat-free or 1% fat milk, and low-fat dairy products, such as yogurt and cheese  Try to limit or avoid red meats  Limit or do not eat fried foods or baked goods, such as cookies  Replace unhealthy fats with healthy fats  Cook foods in olive oil or canola oil  Choose soft margarines that are low in saturated fat and trans fat  Seeds, nuts, and avocados are other examples of healthy fats      Eat foods with omega-3 fats  Examples include salmon, tuna, mackerel, walnuts, and flaxseed  Eat fish 2 times per week  Pregnant women should not eat fish that have high levels of mercury, such as shark, swordfish, and flor mackerel  Increase the amount of high-fiber foods you eat  High-fiber foods can help lower your LDL cholesterol  Aim to get between 20 and 30 grams of fiber each day  Fruits and vegetables are high in fiber  Eat at least 5 servings each day  Other high-fiber foods are whole-grain or whole-wheat breads, pastas, or cereals, and brown rice  Eat 3 ounces of whole-grain foods each day  Increase fiber slowly  You may have abdominal discomfort, bloating, and gas if you add fiber to your diet too quickly  Eat healthy protein foods  Examples include low-fat dairy products, skinless chicken and turkey, fish, and nuts  Limit foods and drinks that are high in sugar  Your dietitian or healthcare provider can help you create daily limits for high-sugar foods and drinks  The limit may be lower if you have diabetes or another health condition  Limits can also help you lose weight if needed  Lifestyle changes you can make to lower your cholesterol levels:   Maintain a healthy weight  Ask your healthcare provider what a healthy weight is for you  Ask him or her to help you create a weight loss plan if needed  Weight loss can decrease your total cholesterol and triglyceride levels  Weight loss may also help keep your blood pressure at a healthy level  Be physically active throughout the day  Physical activity, such as exercise, can help lower your total cholesterol level and maintain a healthy weight  Physical activity can also help increase your HDL cholesterol level  Work with your healthcare provider to create an program that is right for you  Get at least 30 to 40 minutes of moderate physical activity most days of the week  Examples of exercise include brisk walking, swimming, or biking  Also include strength training at least 2 times each week  Your healthcare providers can help you create a physical activity plan  Do not smoke  Nicotine and other chemicals in cigarettes and cigars can raise your cholesterol levels  Ask your healthcare provider for information if you currently smoke and need help to quit  E-cigarettes or smokeless tobacco still contain nicotine  Talk to your healthcare provider before you use these products  Limit or do not drink alcohol  Alcohol can increase your triglyceride levels  Ask your healthcare provider before you drink alcohol  Ask how much is okay for you to drink in 24 hours or 1 week  Follow up with your doctor as directed:  Write down your questions so you remember to ask them during your visits  © Copyright [address] 2022 Information is for End User's use only and may not be sold, redistributed or otherwise used for commercial purposes  All illustrations and images included in CareNotes® are the copyrighted property of [employer]  or [facility] [employer]   The above information is an  only  It is not intended as medical advice for individual conditions or treatments  Talk to your doctor, nurse or pharmacist before following any medical regimen to see if it is safe and effective for you

## 2022-09-28 ENCOUNTER — OFFICE VISIT (OUTPATIENT)
Dept: UROLOGY | Facility: CLINIC | Age: 77
End: 2022-09-28
Payer: COMMERCIAL

## 2022-09-28 VITALS
HEART RATE: 58 BPM | SYSTOLIC BLOOD PRESSURE: 138 MMHG | DIASTOLIC BLOOD PRESSURE: 80 MMHG | HEIGHT: 63 IN | OXYGEN SATURATION: 98 % | BODY MASS INDEX: 34.38 KG/M2 | WEIGHT: 194 LBS

## 2022-09-28 DIAGNOSIS — N40.1 BPH WITH OBSTRUCTION/LOWER URINARY TRACT SYMPTOMS: ICD-10-CM

## 2022-09-28 DIAGNOSIS — R97.20 ELEVATED PSA: Primary | ICD-10-CM

## 2022-09-28 DIAGNOSIS — N13.8 BPH WITH OBSTRUCTION/LOWER URINARY TRACT SYMPTOMS: ICD-10-CM

## 2022-09-28 PROCEDURE — 99203 OFFICE O/P NEW LOW 30 MIN: CPT | Performed by: PHYSICIAN ASSISTANT

## 2022-09-28 RX ORDER — TAMSULOSIN HYDROCHLORIDE 0.4 MG/1
0.4 CAPSULE ORAL
Qty: 90 CAPSULE | Refills: 3 | Status: SHIPPED | OUTPATIENT
Start: 2022-09-28

## 2022-09-28 NOTE — PROGRESS NOTES
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  Previously seen by Dr Leny Peters  He denies any procedures he is not on any medications  He complains of urinary frequency and nocturia  His PSA from the [place] was 5 74  He denies any family history of prostate cancer  He was not in [facility] but in [facility] for 2 years  Past Medical, Past Surgical History:     Past Medical History:   Diagnosis Date    Hypertension     Kidney stone     Obesity    :    Past Surgical History:   Procedure Laterality Date    CHOLECYSTECTOMY      CLOSED REDUCTION HIP DISLOCATION  9/26/2020         HERNIA REPAIR      JOINT REPLACEMENT      LEG SURGERY     :    Medications, Allergies:     Current Outpatient Medications:     aspirin (ECOTRIN LOW STRENGTH) 81 mg EC tablet, Take 81 mg by mouth daily, Disp: , Rfl:     clotrimazole-betamethasone (LOTRISONE) 1-0 05 % cream, Apply topically 2 (two) times a day, Disp: 45 g, Rfl: 0    losartan-hydrochlorothiazide (HYZAAR) 100-12 5 MG per tablet, TAKE ONE TABLET BY MOUTH EVERY DAY, Disp: 90 tablet, Rfl: 0    Multiple Vitamin (MULTIVITAMIN) tablet, Take 1 tablet by mouth daily, Disp: , Rfl:     tamsulosin (FLOMAX) 0 4 mg, Take 1 capsule (0 4 mg total) by mouth daily with dinner, Disp: 90 capsule, Rfl: 3    montelukast (SINGULAIR) 10 mg tablet, TAKE ONE TABLET BY MOUTH EVERY DAY (Patient not taking: Reported on 9/28/2022), Disp: 90 tablet, Rfl: 0    Allergies:   Allergies   Allergen Reactions    Epinephrine Other (See Comments)     "didn't feel right and had SOB for months"    Lidocaine      w/epi    Procaine    :    Social and Family History:   Social History: Social History     Tobacco Use    Smoking status: Never Smoker    Smokeless tobacco: Never Used   Vaping Use    Vaping Use: Never used   Substance Use Topics    Alcohol use: No     Comment: occ    Drug use: No        Social History     Tobacco Use   Smoking Status Never Smoker   Smokeless Tobacco Never Used       Family History:  Family History   Problem Relation Age of Onset    Coronary artery disease Father     Coronary artery disease Mother     Heart failure Mother    :     Review of Systems:     General: Fever, chills, or night sweats: negative  Cardiac: Negative for chest pain  Pulmonary: Negative for shortness of breath  Gastrointestinal: Abdominal pain negative  Nausea, vomiting, or diarrhea negative,  Genitourinary: See HPI above  Patient does not have hematuria  All other systems queried were negative  Physical Exam:   General: Patient is pleasant and in NAD  Awake and alert  /80   Pulse 58   Ht 5' 2 75" (1 594 m)   Wt 88 kg (194 lb)   SpO2 98%   BMI 34 64 kg/m²   HEENT:  Conjunctiva are clear  Constitutional:  pleasant and cooperative     no apparent distress  Cardiac: Peripheral edema: negative  Pulmonary: Non-labored breathing  Abdomen: Soft, non-tender, non-distended  No surgical scars  No masses, tenderness, hernias noted  Genitourinary: Negative CVA tenderness, negative suprapubic tenderness  Extremities:  Moves all extremities  Neurological:CNII-XII intact  No numbness or tingling  Essentially non focal neurologic exam  Psychiatric:mood affect and behavior normal    (Male): Penis circumcised, phallus normal, meatus patent  Testicles descended into scrotum bilaterally without masses nor tenderness  No inguinal hernias bilaterally  CLARISSA: Prostate is enlarged at 35 grams  The prostate is not boggy  The prostate is not tender  firm and asymmetric on the right side        Labs:     Lab Results   Component Value Date    HGB 12 7 08/27/2018    HCT 40 6 08/27/2018 WBC 7 12 08/27/2018     08/27/2018   ]    Lab Results   Component Value Date     07/21/2015    K 4 0 06/18/2021     06/18/2021    CO2 31 06/18/2021    BUN 17 06/18/2021    CREATININE 0 91 06/18/2021    CALCIUM 9 5 06/18/2021    GLUCOSE 130 07/21/2015   ]    Imaging:   I personally reviewed the images and report of the following studies, and reviewed them with the patient:  None      ASSESSMENT:     1  Elevated PSA  2  BPH       PLAN:   -I put him on tamsulosin and will recheck his symptoms later  -I recommended a multiparametric MRI which he refused due to claustrophobia  -would like to see him back in a few months with a repeat PSA and to reassess his lower tract symptoms  -he also declines earlier follow-up and is leaving for [place] in a few weeks be back in March  -follow-up in 6 months with PSA prior to visit      Thank you for allowing me to participate in this patients care  Please do not hesitate to call with any additional questions    Fran Dandy, PA-C

## 2022-10-21 DIAGNOSIS — B37.2 CANDIDAL DERMATITIS: ICD-10-CM

## 2022-10-21 RX ORDER — CLOTRIMAZOLE AND BETAMETHASONE DIPROPIONATE 10; .64 MG/G; MG/G
CREAM TOPICAL 2 TIMES DAILY
Qty: 45 G | Refills: 0 | Status: SHIPPED | OUTPATIENT
Start: 2022-10-21

## 2023-04-25 ENCOUNTER — OFFICE VISIT (OUTPATIENT)
Dept: UROLOGY | Facility: CLINIC | Age: 78
End: 2023-04-25

## 2023-04-25 VITALS
DIASTOLIC BLOOD PRESSURE: 80 MMHG | WEIGHT: 195 LBS | BODY MASS INDEX: 34.55 KG/M2 | HEIGHT: 63 IN | SYSTOLIC BLOOD PRESSURE: 140 MMHG

## 2023-04-25 DIAGNOSIS — R97.20 ELEVATED PSA: Primary | ICD-10-CM

## 2023-04-25 NOTE — PROGRESS NOTES
UROLOGY PROGRESS NOTE   Patient Identifiers: Mirna Goss (MRN [id number])  Date of Service: 4/25/2023    Subjective:   49-year-old man history of BPH and elevated PSA  He previously saw Dr Bc Kumar  He has urinary frequency and nocturia  His last PSA was 5 74  He says he repeated his PSA in [place]  Those results are not available  I put him on tamsulosin for his outlet symptoms  I recommended an MRI but he refused due to claustrophobia  Reason for visit: Elevated PSA follow-up    Objective:     VITALS:    There were no vitals filed for this visit  LABS:  Lab Results   Component Value Date    HGB 12 7 08/27/2018    HCT 40 6 08/27/2018    WBC 7 12 08/27/2018     08/27/2018   ]    Lab Results   Component Value Date     07/21/2015    K 4 0 06/18/2021     06/18/2021    CO2 31 06/18/2021    BUN 17 06/18/2021    CREATININE 0 91 06/18/2021    CALCIUM 9 5 06/18/2021    GLUCOSE 130 07/21/2015   ]        INPATIENT MEDS:    Current Outpatient Medications:   •  aspirin (ECOTRIN LOW STRENGTH) 81 mg EC tablet, Take 81 mg by mouth daily, Disp: , Rfl:   •  clotrimazole-betamethasone (LOTRISONE) 1-0 05 % cream, Apply topically 2 (two) times a day, Disp: 45 g, Rfl: 0  •  losartan-hydrochlorothiazide (HYZAAR) 100-12 5 MG per tablet, TAKE ONE TABLET BY MOUTH EVERY DAY, Disp: 90 tablet, Rfl: 0  •  montelukast (SINGULAIR) 10 mg tablet, TAKE ONE TABLET BY MOUTH EVERY DAY (Patient not taking: Reported on 9/28/2022), Disp: 90 tablet, Rfl: 0  •  Multiple Vitamin (MULTIVITAMIN) tablet, Take 1 tablet by mouth daily, Disp: , Rfl:   •  tamsulosin (FLOMAX) 0 4 mg, Take 1 capsule (0 4 mg total) by mouth daily with dinner, Disp: 90 capsule, Rfl: 3      Physical Exam:   There were no vitals taken for this visit    GEN: no acute distress    RESP: breathing comfortably with no accessory muscle use    ABD: soft, non-tender, non-distended   INCISION:    EXT: no significant peripheral edema     RADIOLOGY: None    Assessment:   #1  Urinary frequency and nocturia  #2    History of elevated PSA    Plan:   -I offered him cystoscopy and TRUS which he refused  -Follow-up in 1 year for his annual exam  -  -

## 2023-06-01 ENCOUNTER — HOSPITAL ENCOUNTER (EMERGENCY)
Facility: HOSPITAL | Age: 78
Discharge: HOME | End: 2023-06-01
Attending: EMERGENCY MEDICINE
Payer: COMMERCIAL

## 2023-06-01 ENCOUNTER — APPOINTMENT (EMERGENCY)
Dept: RADIOLOGY | Facility: HOSPITAL | Age: 78
End: 2023-06-01
Payer: COMMERCIAL

## 2023-06-01 VITALS
WEIGHT: 199.3 LBS | HEIGHT: 62 IN | SYSTOLIC BLOOD PRESSURE: 159 MMHG | OXYGEN SATURATION: 98 % | DIASTOLIC BLOOD PRESSURE: 78 MMHG | RESPIRATION RATE: 18 BRPM | BODY MASS INDEX: 36.67 KG/M2 | TEMPERATURE: 98.5 F | HEART RATE: 67 BPM

## 2023-06-01 DIAGNOSIS — S73.005A DISLOCATION OF LEFT HIP, INITIAL ENCOUNTER (HCC): Primary | ICD-10-CM

## 2023-06-01 PROCEDURE — 96361 HYDRATE IV INFUSION ADD-ON: CPT

## 2023-06-01 PROCEDURE — 27265 TREAT HIP DISLOCATION: CPT | Performed by: STUDENT IN AN ORGANIZED HEALTH CARE EDUCATION/TRAINING PROGRAM

## 2023-06-01 PROCEDURE — 96374 THER/PROPH/DIAG INJ IV PUSH: CPT

## 2023-06-01 PROCEDURE — 96375 TX/PRO/DX INJ NEW DRUG ADDON: CPT

## 2023-06-01 PROCEDURE — 99283 EMERGENCY DEPT VISIT LOW MDM: CPT

## 2023-06-01 PROCEDURE — 99244 OFF/OP CNSLTJ NEW/EST MOD 40: CPT | Performed by: STUDENT IN AN ORGANIZED HEALTH CARE EDUCATION/TRAINING PROGRAM

## 2023-06-01 PROCEDURE — 73502 X-RAY EXAM HIP UNI 2-3 VIEWS: CPT

## 2023-06-01 PROCEDURE — 73501 X-RAY EXAM HIP UNI 1 VIEW: CPT

## 2023-06-01 RX ORDER — PROPOFOL 10 MG/ML
100 INJECTION, EMULSION INTRAVENOUS ONCE
Status: COMPLETED | OUTPATIENT
Start: 2023-06-01 | End: 2023-06-01

## 2023-06-01 RX ORDER — MORPHINE SULFATE 4 MG/ML
4 INJECTION, SOLUTION INTRAMUSCULAR; INTRAVENOUS ONCE
Status: COMPLETED | OUTPATIENT
Start: 2023-06-01 | End: 2023-06-01

## 2023-06-01 RX ORDER — FENTANYL CITRATE 50 UG/ML
50 INJECTION, SOLUTION INTRAMUSCULAR; INTRAVENOUS ONCE
Status: COMPLETED | OUTPATIENT
Start: 2023-06-01 | End: 2023-06-01

## 2023-06-01 RX ORDER — ONDANSETRON 2 MG/ML
4 INJECTION INTRAMUSCULAR; INTRAVENOUS ONCE
Status: COMPLETED | OUTPATIENT
Start: 2023-06-01 | End: 2023-06-01

## 2023-06-01 RX ORDER — PROPOFOL 10 MG/ML
200 INJECTION, EMULSION INTRAVENOUS ONCE
Status: COMPLETED | OUTPATIENT
Start: 2023-06-01 | End: 2023-06-01

## 2023-06-01 RX ADMIN — PROPOFOL 100 MG: 10 INJECTION, EMULSION INTRAVENOUS at 19:11

## 2023-06-01 RX ADMIN — FENTANYL CITRATE 50 MCG: 50 INJECTION INTRAMUSCULAR; INTRAVENOUS at 17:35

## 2023-06-01 RX ADMIN — PROPOFOL 100 MG: 10 INJECTION, EMULSION INTRAVENOUS at 17:38

## 2023-06-01 RX ADMIN — ONDANSETRON 4 MG: 2 INJECTION INTRAMUSCULAR; INTRAVENOUS at 16:06

## 2023-06-01 RX ADMIN — SODIUM CHLORIDE 1000 ML: 0.9 INJECTION, SOLUTION INTRAVENOUS at 17:33

## 2023-06-01 RX ADMIN — FENTANYL CITRATE 50 MCG: 50 INJECTION INTRAMUSCULAR; INTRAVENOUS at 19:10

## 2023-06-01 RX ADMIN — MORPHINE SULFATE 4 MG: 4 INJECTION INTRAVENOUS at 16:08

## 2023-06-01 NOTE — ED PROCEDURE NOTE
"Procedure  Procedural Sedation    Date/Time: 6/1/2023 7:20 PM    Performed by: Jovan Lane MD  Authorized by: Jovan Lane MD    Immediate pre-procedure details:     Reassessment: Patient reassessed immediately prior to procedure      Reviewed: vital signs, relevant labs/tests and NPO status      Verified: bag valve mask available, emergency equipment available, intubation equipment available, IV patency confirmed, oxygen available, reversal medications available and suction available    Procedure details (see MAR for exact dosages):     Sedation start time:  6/1/2023 7:20 PM    Preoxygenation:  Nasal cannula    Sedation:  Propofol    Analgesia:  Fentanyl    Intra-procedure monitoring:  Blood pressure monitoring, frequent LOC assessments, frequent vital sign checks, continuous capnometry, continuous pulse oximetry and cardiac monitor    Intra-procedure events: none      Intra-procedure management:  BVM ventilation    Sedation end time:  6/1/2023 7:25 PM    Total sedation time (minutes):  5  Post-procedure details:     Post-sedation assessment completed:  6/1/2023 7:30 PM    Attendance: Constant attendance by certified staff until patient recovered      Recovery: Patient returned to pre-procedure baseline      Post-sedation assessments completed and reviewed: airway patency, cardiovascular function, mental status, nausea/vomiting, pain level and respiratory function      Patient is stable for discharge or admission: yes      Patient tolerance: Tolerated well, no immediate complications    Orthopedic injury treatment    Date/Time: 6/1/2023 7:30 PM    Performed by: Jovan Lane MD  Authorized by: Jovan Lane MD    Patient Location:  ED  [place] Protocol:  Procedure performed by: (Dr Kameron Calvin)  Consent: Verbal consent obtained  Written consent obtained    Risks and benefits: risks, benefits and alternatives were discussed  Consent given by: patient  Time out: Immediately prior to procedure a \"time out\" was " called to verify the correct patient, procedure, equipment, support staff and site/side marked as required  Patient understanding: patient states understanding of the procedure being performed  Patient consent: the patient's understanding of the procedure matches consent given  Procedure consent: procedure consent matches procedure scheduled  Relevant documents: relevant documents present and verified  Site marked: the operative site was marked  Radiology Images displayed and confirmed  If images not available, report reviewed: imaging studies available  Patient identity confirmed: verbally with patient and arm band      Injury location:  Hip  Location details:  Left hip  Injury type:  Dislocation  Dislocation type: posterior    Neurovascular status: Neurovascularly intact    Distal perfusion: normal    Neurological function: normal    Range of motion: normal    Sedation type:   Moderate (conscious) sedation (See separate Procedural Sedation form)                     Gisselle Chowdhury MD  06/02/23 2047

## 2023-06-01 NOTE — CONSULTS
Orthopedics Consultation- Orthopedics    [unfilled]  Chief Complaint:   left hip pain    HPI:   66 y o male community ambulator status post left hip dislocation with presenting to the ED complaining of left hip pain and inability to bear weight  No Headstrike, no LOC, no blood thinners  Pain is sharp and intense in character, Located in the posterior gluteal region surrounding the hip, acute in onset, constant in duration, severe in intensity  Exacerbating factors any movement, remitting factors staying in the same position  None radiating, no numbness, no tingling, no open wounds noted  No other complaints at this time  Patient's IESHA was performed by an unknown surgeon in [place] in 2008  The patient has had several dislocation episodes  He states that he is dislocated his left hip 10 times now but is scared to get a revision and has not followed up with any orthopedist even after his most recent dislocation which was approximately a year ago dPenies any fevers or chills  Denies any discharge from his previous wound site        Past medical history  Hypertension, nephro lithiasis, obesity        Physical Exam:     Musculoskeletal: left lower extremity  · Skin intact no erythema, well healed posterior lateral incision  · Limb shortened and internally  rotated  · Mild tenderness to left hip  · Sensation intact L3-S1  · Motor intact with knee flexion/extension, ankle dorsi/plantar flexion, EHL/FHL intact  · 2+ DP/PT pulse  · Musculature is soft and compressible, no pain with passive stretch    Radiology:   I personally reviewed the films  X-rays AP/Lateral views of left hip shows dislocation of left hip joint and a posterior superior dislocation pattern there is no acute fractures or dislocations surrounding the implants  Implants appear appropriate position without any signs of loosening at the middle bone interface  Press-fit stem and cup  Procedure note:  Hip reduction    After informed consent was obtained and a time out was performed pt underwent conscious sedation performed by the emergency department  Pts hip was reduced with flexion, internal rotation, and axial traction  A palpable cluck was felt  Post reduction x rays are ordered and demonstrated reduction of the hip  Pt tolerated the procedure well and was neurovascularly intact pre and post procedure  Assessment:  66 y o male with left hip dislocation    Plan:   · Weight bearing as tolerated left lower extremity in hip abduction pillow  · Posterior hip precautions  · May DC from ED  · Body mass index is 36 45 kg/m²  moderately obese  Recommend behavior modifications and nutrition    · Recommended follow-up with orthopedic adult reconstruction for consultation about revision total hip arthroplasty given the patient's multiple dislocation events and continued instability  · Analgesics for pain  · Dispo: Steven Cloud for discharge from [address], DO

## 2023-06-01 NOTE — ED ATTENDING ATTESTATION
6/1/2023  IKimberly MD, saw and evaluated the patient  I have discussed the patient with the resident/non-physician practitioner and agree with the resident's/non-physician practitioner's findings, Plan of Care, and MDM as documented in the resident's/non-physician practitioner's note, except where noted  All available labs and Radiology studies were reviewed  I was present for key portions of any procedure(s) performed by the resident/non-physician practitioner and I was immediately available to provide assistance  At this point I agree with the current assessment done in the Emergency Department  I have conducted an independent evaluation of this patient a history and physical is as follows: Patient suffered recurrent prosthetic hip dislocation  Patient states he has had roughly 10 of these in the past   Denies any falls or trauma  Procedural sedation per procedure note, attempted closed reduction with multiple attempts, adequate sedation, unable to reduce via closed reduction techniques  Appreciate orthopedics consultation, in person consultation for second reduction attempt  Please refer to procedure note for further details  Reduction attempt performed by orthopedic physician was successful  ED Course         Critical Care Time  Pre-Procedural Sedation    Performed by: Kimberly Fallon MD  Authorized by: Kimberly Fallon MD    Consent:     Consent obtained:  Verbal    Consent given by:  Patient    Risks discussed:   Allergic reaction, dysrhythmia, nausea, vomiting and prolonged sedation necessitating reversal    Alternatives discussed:  Analgesia without sedation  Universal protocol:     Patient identity confirmation method:  Verbally with patient  Indications:     Sedation purpose:  Dislocation reduction    Procedure necessitating sedation performed by:  Physician performing sedation    Intended level of sedation:  Moderate (conscious sedation)  Pre-sedation assessment:     Time since last food or drink:  4    NPO status caution: urgency dictates proceeding with non-ideal NPO status      ASA classification: class 2 - patient with mild systemic disease      Neck mobility: normal      Mouth opening:  3 or more finger widths    Mallampati score:  II - soft palate, uvula, fauces visible    Pre-sedation assessments completed and reviewed: airway patency, cardiovascular function, hydration status, mental status, nausea/vomiting, pain level, respiratory function and temperature      History of difficult intubation: no      Pre-sedation assessment completed:  6/1/2023 5:29 PM  Procedural Sedation    Date/Time: 6/1/2023 5:31 PM    Performed by: Dominic Howell MD  Authorized by: Dominic Howell MD    Immediate pre-procedure details:     Reviewed: vital signs      Verified: bag valve mask available, emergency equipment available, intubation equipment available, IV patency confirmed and oxygen available    Procedure details (see MAR for exact dosages):     Sedation start time:  6/1/2023 5:31 PM    Preoxygenation:  Nasal cannula    Sedation:  Propofol    Analgesia:  Fentanyl    Intra-procedure monitoring:  Blood pressure monitoring, cardiac monitor, continuous capnometry, continuous pulse oximetry, frequent LOC assessments and frequent vital sign checks    Intra-procedure events: none      Sedation end time:  6/1/2023 5:59 PM    Total sedation time (minutes):  28  Post-procedure details:     Post-sedation assessment completed:  6/1/2023 7:00 PM    Attendance: Constant attendance by certified staff until patient recovered      Recovery: Patient returned to pre-procedure baseline      Patient is stable for discharge or admission: no (Reduction unsuccessful)      Patient tolerance:   Tolerated well, no immediate complications  Procedural Sedation    Date/Time: 6/1/2023 7:39 PM    Performed by: Dominic Howell MD  Authorized by: Dominic Howell MD    Procedure details (see MAR for exact dosages):     Sedation start time:  6/1/2023 7:09 PM    Preoxygenation:  Nasal cannula    Sedation:  Propofol    Analgesia:  Fentanyl    Intra-procedure monitoring:  Blood pressure monitoring, cardiac monitor, continuous capnometry, continuous pulse oximetry, frequent LOC assessments and frequent vital sign checks    Intra-procedure events: hypoxia      Intra-procedure management:  Airway repositioning    Sedation end time:  6/1/2023 7:39 PM    Total sedation time (minutes):  30  Post-procedure details:     Post-sedation assessment completed:  6/1/2023 7:40 PM    Attendance: Constant attendance by certified staff until patient recovered      Recovery: Patient returned to pre-procedure baseline      Post-sedation assessments completed and reviewed: airway patency, cardiovascular function, hydration status, mental status, nausea/vomiting, pain level, respiratory function and temperature      Patient is stable for discharge or admission: yes      Patient tolerance: Tolerated well, no immediate complications  Comments:      Second procedural sedation performed with orthopedic physician performing reduction  Orthopedic injury treatment    Date/Time: 6/1/2023 7:40 PM    Performed by: Duane Hubert, MD  Authorized by: Duane Hubert, MD    Patient Location:  ED  [place] Protocol:  Procedure performed by:  Consent: Verbal consent obtained  Written consent obtained    Risks and benefits: risks, benefits and alternatives were discussed  Consent given by: patient  Timeout called at: 6/1/2023 5:30 PM   Patient understanding: patient states understanding of the procedure being performed  Patient consent: the patient's understanding of the procedure matches consent given  Procedure consent: procedure consent matches procedure scheduled  Site marked: the operative site was marked  Patient identity confirmed: verbally with patient      Injury location:  Hip  Injury type: Dislocation  Neurovascular status: Neurovascularly intact    Distal perfusion: normal    Neurological function: normal    Range of motion: reduced    Local anesthesia used?: No    General anesthesia used?: No    Sedation type:   Moderate (conscious) sedation (See separate Procedural Sedation form)  Manipulation performed?: Yes    Skeletal traction used?: No    Reduction successful?: No    Neurovascular status: Neurovascularly intact    Distal perfusion: normal    Neurological function: normal    Range of motion: unchanged

## 2023-06-01 NOTE — ED PROVIDER NOTES
History  Chief Complaint   Patient presents with   • Hip Injury     Pt laid down on the ground to work on the bottom of his car when his L hip dislocated  Pt states this is his 10th hip dislocation  +pulses, inward rotation of hip       History provided by:  Patient  Hip Pain  Location:  Left hip  Severity:  Moderate  Onset quality:  Sudden  Duration:  30 minutes  Timing:  Constant  Progression:  Waxing and waning  Chronicity:  New  Context:  Patient layed down and his left hip prostesis dislocated  This is the 10th time that it has dislocated  Relieved by:  Nothing  Worsened by: Movement  Associated symptoms: no abdominal pain, no rash and no shortness of breath        Prior to Admission Medications   Prescriptions Last Dose Informant Patient Reported? Taking?    Multiple Vitamin (MULTIVITAMIN) tablet  Self Yes No   Sig: Take 1 tablet by mouth daily   aspirin (ECOTRIN LOW STRENGTH) 81 mg EC tablet  Self Yes No   Sig: Take 81 mg by mouth daily   clotrimazole-betamethasone (LOTRISONE) 1-0 05 % cream   No No   Sig: Apply topically 2 (two) times a day   losartan-hydrochlorothiazide (HYZAAR) 100-12 5 MG per tablet   No No   Sig: TAKE ONE TABLET BY MOUTH EVERY DAY   montelukast (SINGULAIR) 10 mg tablet   No No   Sig: TAKE ONE TABLET BY MOUTH EVERY DAY   tamsulosin (FLOMAX) 0 4 mg   No No   Sig: Take 1 capsule (0 4 mg total) by mouth daily with dinner   Patient not taking: Reported on 4/25/2023      Facility-Administered Medications: None       Past Medical History:   Diagnosis Date   • Hypertension    • Kidney stone    • Obesity        Past Surgical History:   Procedure Laterality Date   • CHOLECYSTECTOMY     • CLOSED REDUCTION HIP DISLOCATION  9/26/2020        • HERNIA REPAIR     • JOINT REPLACEMENT     • LEG SURGERY         Family History   Problem Relation Age of Onset   • Coronary artery disease Father    • Coronary artery disease Mother    • Heart failure Mother      I have reviewed and agree with the history as documented  E-Cigarette/Vaping   • E-Cigarette Use Never User      E-Cigarette/Vaping Substances   • Nicotine No    • THC No    • CBD No    • Flavoring No    • Other No    • Unknown No      Social History     Tobacco Use   • Smoking status: Never   • Smokeless tobacco: Never   Vaping Use   • Vaping Use: Never used   Substance Use Topics   • Alcohol use: No     Comment: occ   • Drug use: No       Review of Systems   Constitutional: Positive for activity change  Respiratory: Negative for chest tightness and shortness of breath  Gastrointestinal: Negative for abdominal pain  Musculoskeletal: Positive for arthralgias  Negative for back pain and joint swelling  Skin: Negative for color change, pallor, rash and wound  All other systems reviewed and are negative  Physical Exam  Physical Exam  Vitals and nursing note reviewed  Constitutional:       General: He is in acute distress  Appearance: Normal appearance  He is normal weight  He is not ill-appearing, toxic-appearing or diaphoretic  HENT:      Head: Normocephalic  Right Ear: External ear normal       Left Ear: External ear normal    Eyes:      General:         Right eye: No discharge  Left eye: No discharge  Conjunctiva/sclera: Conjunctivae normal    Cardiovascular:      Rate and Rhythm: Normal rate  Heart sounds: Normal heart sounds  Pulmonary:      Effort: Pulmonary effort is normal       Breath sounds: Normal breath sounds  Abdominal:      Tenderness: There is no abdominal tenderness  There is no guarding or rebound  Musculoskeletal:      Comments: Left leg shortened and internally rotated   Skin:     General: Skin is warm  Capillary Refill: Capillary refill takes less than 2 seconds  Neurological:      Mental Status: He is alert and oriented to person, place, and time  Psychiatric:         Mood and Affect: Mood normal          Behavior: Behavior normal          Thought Content:  Thought content normal          Judgment: Judgment normal          Vital Signs  ED Triage Vitals [06/01/23 1545]   Temperature Pulse Respirations Blood Pressure SpO2   98 5 °F (36 9 °C) 76 22 131/83 98 %      Temp Source Heart Rate Source Patient Position - Orthostatic VS BP Location FiO2 (%)   Oral Monitor Lying Right arm --      Pain Score       10 - Worst Possible Pain           Vitals:    06/01/23 1920 06/01/23 1925 06/01/23 1930 06/01/23 2000   BP: 141/69 150/73 (!) 174/84 159/78   Pulse: 61 60 65 67   Patient Position - Orthostatic VS:             Visual Acuity      ED Medications  Medications   morphine injection 4 mg (4 mg Intravenous Given 6/1/23 1608)   ondansetron (ZOFRAN) injection 4 mg (4 mg Intravenous Given 6/1/23 1606)   propofol (DIPRIVAN) 200 MG/20ML bolus injection 200 mg (100 mg Intravenous Given 6/1/23 1738)   fentanyl citrate (PF) 100 MCG/2ML 50 mcg (50 mcg Intravenous Given 6/1/23 1735)   sodium chloride 0 9 % bolus 1,000 mL (0 mL Intravenous Stopped 6/1/23 1933)   propofol (DIPRIVAN) 200 MG/20ML bolus injection 100 mg (100 mg Intravenous Given 6/1/23 1911)   fentanyl citrate (PF) 100 MCG/2ML 50 mcg (50 mcg Intravenous Given 6/1/23 1910)       Diagnostic Studies  Results Reviewed     None                 XR hip/pelv 1 vw LEFT if performed   ED Interpretation by Allen Gurrola PA-C (06/01 1951)   Anatomical alignment      XR hip/pelv 2-3 vws left if performed   ED Interpretation by Allen Gurrola PA-C (06/01 1707)   dislocation                 Procedures  Procedures         ED Course  ED Course as of 06/01/23 2042   Thu Jun 01, 2023 1702 Dr Adam Brennan and Dr Gunjan Herrera are going to reduce the prostesis   1929 Left hip reduced by Dr Milo Hernandez from [address] [place] [address]  Patient presented to the emergency room after dislocating his left total hip replacement  This has happened 9 times previously    It is very difficult to place he complains of pain and deformity in the knee and inability to ambulate  Physical exam this is a 77-year-old white male is alert and oriented x3  He is in no acute distress  His airway is patent  His lungs are clear to auscultation  His heart is regular rate and rhythm without murmur S3-S4  His abdomen is soft nondistended nontender without mass or hepatosplenomegaly  His left leg is foreshortened and internally rotated  There are palpable pulses over the dorsalis pedis and posterior tibial arteries that are +2  Capillary refills less than 2 seconds  X-rays of the left hip demonstrate a dislocated total hip replacement  Conscious sedation was performed by Dr Fanta Chung with the Resident Dr Hilton Said  An attempt at a closed reduction was performed unsuccessfully  A consult was placed for Dr Erika Roman from orthopedics to perform the procedure  Conscious sedation was readministered to the patient  The patient tolerated the procedure well  He had to be bagged for short period of time until his oxygenation improved  He did have a period of  bradycardia and improved with oxygenation  His successful postreduction was performed  A postreduction x-ray was anatomic  Impression-  Acute dislocation of the left total hip replacement    Plan-  Patient will ambulate with a walker  He will be weightbearing as tolerated on his left lower extremity  He is to call make a follow-up appointment with his orthopedic surgeon  Dislocation of left hip, initial encounter [facility]): acute illness or injury  Amount and/or Complexity of Data Reviewed  Radiology: ordered and independent interpretation performed  Details: Independently interpreted by me      Risk  Prescription drug management            Disposition  Final diagnoses:   Dislocation of left hip, initial encounter ([facility] - [facility]     Time reflects when diagnosis was documented in both MDM as applicable and the Disposition within this note     Time User Action Codes Description Comment    6/1/2023  5:08 PM Larry Cox Add [O41 994S] Dislocation of left hip, initial encounter ([address] )     6/1/2023  5:09 PM Johan Koo Modify [W38 230W] Dislocation of left hip, initial encounter [facility]) [facility]      ED Disposition     ED Disposition   Discharge    Condition   Stable    Date/Time   Thu Jun 1, 2023  7:52 PM    [address] discharge to home/self care  Follow-up Information     Follow up With Specialties Details Why Contact Info    Your Orthopedist  Schedule an appointment as soon as possible for a visit             Discharge Medication List as of 6/1/2023  7:52 PM      CONTINUE these medications which have NOT CHANGED    Details   aspirin (ECOTRIN LOW STRENGTH) 81 mg EC tablet Take 81 mg by mouth daily, Historical Med      clotrimazole-betamethasone (LOTRISONE) 1-0 05 % cream Apply topically 2 (two) times a day, Starting Tue 4/25/2023, Normal      losartan-hydrochlorothiazide (HYZAAR) 100-12 5 MG per tablet TAKE ONE TABLET BY MOUTH EVERY DAY, Normal      montelukast (SINGULAIR) 10 mg tablet TAKE ONE TABLET BY MOUTH EVERY DAY, Normal      Multiple Vitamin (MULTIVITAMIN) tablet Take 1 tablet by mouth daily, Historical Med      tamsulosin (FLOMAX) 0 4 mg Take 1 capsule (0 4 mg total) by mouth daily with dinner, Starting Wed 9/28/2022, Normal             No discharge procedures on file      PDMP Review     None          ED Provider  Electronically Signed by           Evin Palafox PA-C  06/01/23 Demond Gusman 86, [place]  06/01/23 2042

## 2023-07-14 ENCOUNTER — HOSPITAL ENCOUNTER (EMERGENCY)
Facility: HOSPITAL | Age: 78
Discharge: HOME | End: 2023-07-14
Attending: EMERGENCY MEDICINE
Payer: COMMERCIAL

## 2023-07-14 ENCOUNTER — APPOINTMENT (EMERGENCY)
Dept: RADIOLOGY | Facility: HOSPITAL | Age: 78
End: 2023-07-14
Payer: COMMERCIAL

## 2023-07-14 VITALS
TEMPERATURE: 97.8 F | WEIGHT: 181.88 LBS | DIASTOLIC BLOOD PRESSURE: 58 MMHG | RESPIRATION RATE: 18 BRPM | SYSTOLIC BLOOD PRESSURE: 125 MMHG | BODY MASS INDEX: 33.27 KG/M2 | HEART RATE: 70 BPM | OXYGEN SATURATION: 97 %

## 2023-07-14 DIAGNOSIS — S73.015A: Primary | ICD-10-CM

## 2023-07-14 PROCEDURE — 99283 EMERGENCY DEPT VISIT LOW MDM: CPT

## 2023-07-14 PROCEDURE — 96374 THER/PROPH/DIAG INJ IV PUSH: CPT

## 2023-07-14 PROCEDURE — 73502 X-RAY EXAM HIP UNI 2-3 VIEWS: CPT

## 2023-07-14 PROCEDURE — 73501 X-RAY EXAM HIP UNI 1 VIEW: CPT

## 2023-07-14 RX ORDER — FENTANYL CITRATE 50 UG/ML
50 INJECTION, SOLUTION INTRAMUSCULAR; INTRAVENOUS ONCE
Status: COMPLETED | OUTPATIENT
Start: 2023-07-14 | End: 2023-07-14

## 2023-07-14 RX ORDER — FENTANYL CITRATE 50 UG/ML
50 INJECTION, SOLUTION INTRAMUSCULAR; INTRAVENOUS ONCE
Status: DISCONTINUED | OUTPATIENT
Start: 2023-07-14 | End: 2023-07-14 | Stop reason: HOSPADM

## 2023-07-14 RX ORDER — PROPOFOL 10 MG/ML
90 INJECTION, EMULSION INTRAVENOUS ONCE
Status: COMPLETED | OUTPATIENT
Start: 2023-07-14 | End: 2023-07-14

## 2023-07-14 RX ADMIN — PROPOFOL 80 MG: 10 INJECTION, EMULSION INTRAVENOUS at 21:05

## 2023-07-14 RX ADMIN — FENTANYL CITRATE 50 MCG: 50 INJECTION, SOLUTION INTRAMUSCULAR; INTRAVENOUS at 19:29

## 2023-07-14 NOTE — ED ATTENDING ATTESTATION
7/14/2023  I, Deepthi Mak DO, saw and evaluated the patient. I have discussed the patient with the resident/non-physician practitioner and agree with the resident's/non-physician practitioner's findings, Plan of Care, and MDM as documented in the resident's/non-physician practitioner's note, except where noted. All available labs and Radiology studies were reviewed. I was present for key portions of any procedure(s) performed by the resident/non-physician practitioner and I was immediately available to provide assistance. At this point I agree with the current assessment done in the Emergency Department. I have conducted an independent evaluation of this patient a history and physical is as follows:    67 yo male community ambulator with L IESHA and recurrent dislocations presents for L hip pain, dislocation after he bent over to get into his car. No focal weakness/numbness/tingling. This is his 11th dislocation. Recommend closed reduction under sedation. Post reduction films to confirm appropriate reduction. Abduction pillow, follow up with orthopedics if able to reduce.       ED Course         Critical Care Time  Procedures

## 2023-07-15 NOTE — ED PROVIDER NOTES
History  Chief Complaint   Patient presents with   • Hip Injury     Pt was getting into car and L hip popped out of place. Hx L hip replacement. Happened approx 1 month ago as well     Patient is a 70-year-old male with history of left hip replacement who presents with hip injury. Patient states that he was getting to a car when he felt his left hip "pop out". He states that this happens frequently. Per chart review this patient has been seen frequently in his emergency department for hip dislocations and reductions. Patient Dors is significant pain of his hip but otherwise denies any numbness, tingling, weakness, chest pain, shortness of breath. Patient states that he is supposed to be scheduling a procedure for his hip to reduce these instances but has not been able to see his orthopedic surgeon for it yet. Prior to Admission Medications   Prescriptions Last Dose Informant Patient Reported? Taking?    Multiple Vitamin (MULTIVITAMIN) tablet  Self Yes No   Sig: Take 1 tablet by mouth daily   aspirin (ECOTRIN LOW STRENGTH) 81 mg EC tablet  Self Yes No   Sig: Take 81 mg by mouth daily   clotrimazole-betamethasone (LOTRISONE) 1-0.05 % cream   No No   Sig: Apply topically 2 (two) times a day   losartan-hydrochlorothiazide (HYZAAR) 100-12.5 MG per tablet   No No   Sig: TAKE ONE TABLET BY MOUTH EVERY DAY   montelukast (SINGULAIR) 10 mg tablet   No No   Sig: TAKE ONE TABLET BY MOUTH EVERY DAY   tamsulosin (FLOMAX) 0.4 mg   No No   Sig: Take 1 capsule (0.4 mg total) by mouth daily with dinner   Patient not taking: Reported on 4/25/2023      Facility-Administered Medications: None       Past Medical History:   Diagnosis Date   • Hypertension    • Kidney stone    • Obesity        Past Surgical History:   Procedure Laterality Date   • CHOLECYSTECTOMY     • CLOSED REDUCTION HIP DISLOCATION  9/26/2020        • HERNIA REPAIR     • JOINT REPLACEMENT     • LEG SURGERY         Family History   Problem Relation Age of Onset   • Coronary artery disease Father    • Coronary artery disease Mother    • Heart failure Mother      I have reviewed and agree with the history as documented. E-Cigarette/Vaping   • E-Cigarette Use Never User      E-Cigarette/Vaping Substances   • Nicotine No    • THC No    • CBD No    • Flavoring No    • Other No    • Unknown No      Social History     Tobacco Use   • Smoking status: Never   • Smokeless tobacco: Never   Vaping Use   • Vaping Use: Never used   Substance Use Topics   • Alcohol use: No     Comment: occ   • Drug use: No        Review of Systems   Constitutional: Negative for chills and fever. HENT: Negative for congestion, rhinorrhea and sore throat. Eyes: Negative for pain and redness. Respiratory: Negative for cough and shortness of breath. Cardiovascular: Negative for chest pain and palpitations. Gastrointestinal: Negative for abdominal pain, constipation, diarrhea, nausea and vomiting. Genitourinary: Negative for dysuria and hematuria. Musculoskeletal: Positive for arthralgias. Negative for back pain and neck pain. Skin: Negative for pallor and rash. Neurological: Negative for weakness and numbness. All other systems reviewed and are negative. Physical Exam  ED Triage Vitals [07/14/23 1903]   Temperature Pulse Respirations Blood Pressure SpO2   97.8 °F (36.6 °C) 82 20 165/82 99 %      Temp Source Heart Rate Source Patient Position - Orthostatic VS BP Location FiO2 (%)   Oral Monitor Lying Left arm --      Pain Score       10 - Worst Possible Pain             Orthostatic Vital Signs  Vitals:    07/14/23 2103 07/14/23 2107 07/14/23 2111 07/14/23 2115   BP: (!) 173/78 148/66 129/62 125/58   Pulse: 80 74 71 70   Patient Position - Orthostatic VS:           Physical Exam  Vitals and nursing note reviewed. Constitutional:       General: He is not in acute distress. Appearance: Normal appearance. He is well-developed and normal weight.  He is not ill-appearing, toxic-appearing or diaphoretic. HENT:      Head: Normocephalic and atraumatic. Right Ear: External ear normal.      Left Ear: External ear normal.      Nose: Nose normal. No congestion or rhinorrhea. Mouth/Throat:      Mouth: Mucous membranes are moist.      Pharynx: Oropharynx is clear. No oropharyngeal exudate or posterior oropharyngeal erythema. Eyes:      General: No scleral icterus. Right eye: No discharge. Left eye: No discharge. Extraocular Movements: Extraocular movements intact. Conjunctiva/sclera: Conjunctivae normal.      Pupils: Pupils are equal, round, and reactive to light. Cardiovascular:      Rate and Rhythm: Normal rate and regular rhythm. Pulses: Normal pulses. Heart sounds: Normal heart sounds. No murmur heard. No friction rub. No gallop. Pulmonary:      Effort: Pulmonary effort is normal. No respiratory distress. Breath sounds: Normal breath sounds. No stridor. No wheezing, rhonchi or rales. Abdominal:      General: Abdomen is flat. Bowel sounds are normal. There is no distension. Palpations: Abdomen is soft. Tenderness: There is no abdominal tenderness. There is no right CVA tenderness, left CVA tenderness or guarding. Musculoskeletal:         General: Tenderness and deformity present. Cervical back: Normal range of motion and neck supple. No rigidity or tenderness. Right lower leg: No edema. Left lower leg: No edema. Comments: The left leg is internally rotated and shortened by several inches. There is significant pain to palpation of the left hip. Patient is able to dorsiflex his ankle. DP pulses are 2+ bilaterally. Patient has full sensation of bilateral lower extremities. Skin:     General: Skin is warm and dry. Capillary Refill: Capillary refill takes less than 2 seconds. Coloration: Skin is not jaundiced or pale. Neurological:      General: No focal deficit present.       Mental Status: He is alert and oriented to person, place, and time. Cranial Nerves: No cranial nerve deficit. Sensory: No sensory deficit. Motor: No weakness. Psychiatric:         Mood and Affect: Mood normal.         Behavior: Behavior normal.         ED Medications  Medications   fentanyl citrate (PF) 100 MCG/2ML 50 mcg (has no administration in time range)   fentanyl citrate (PF) 100 MCG/2ML 50 mcg (50 mcg Intravenous Given 7/14/23 1929)   propofol (DIPRIVAN) 200 MG/20ML bolus injection 90 mg (80 mg Intravenous Given by Other 7/14/23 2105)       Diagnostic Studies  Results Reviewed     None                 XR hip/pelv 1 vw left if performed   ED Interpretation by Aliyah Robbins MD (07/14 2137)   Femur head reduced in joint      XR hip/pelv 2-3 vws left if performed   ED Interpretation by Aliyah Robbins MD (07/14 2002)   Abnormal   Posterior dislocation of artificial hip            Procedures  Orthopedic injury treatment    Date/Time: 7/14/2023 10:59 PM    Performed by: Aliyah Robbins MD  Authorized by: Aliyah Robbins MD    Patient Location:  ED  [place] Protocol:  Procedure performed by: (Dr. Collette Barters, Dr. Kaia Rios, Dr. Pacheco Levin)  Consent given by: patient  Time out: Immediately prior to procedure a "time out" was called to verify the correct patient, procedure, equipment, support staff and site/side marked as required. Injury location:  Hip  Location details:  Left hip  Injury type:  Dislocation  Dislocation type: posterior    Spontaneous?: Yes    Prosthesis?: Yes    Neurovascular status: Neurovascularly intact    Distal perfusion: normal    Neurological function: normal    Range of motion: normal    Sedation type:   Moderate (conscious) sedation (See separate Procedural Sedation form)  Manipulation performed?: Yes    Reduction method:  Allis maneuver  Reduction method:  Allis maneuver  Reduction method:  Allis maneuver  Reduction method:  Allis maneuver  Reduction method:  Madelyn Lopez maneuver  Reduction method:  Allis maneuver  Reduction successful?: Yes    Confirmation: Reduction confirmed by x-ray    Neurovascular status: Neurovascularly intact    Distal perfusion: normal    Neurological function: normal    Range of motion: normal    Patient tolerance:  Patient tolerated the procedure well with no immediate complications          ED Course                                       Medical Decision Making  Patient is a 63-year-old male with history of left hip replacement who presents with hip injury. Given patient's presentation of internally rotated and shortened leg, most likely dislocated. Will get x-ray to rule out fractures. Fentanyl for pain. Patient's x-ray confirms posterior dislocation of artificial hip, I consented patient to the hip reduction under procedural sedation, please see my procedure note for reduction and the procedural sedation note from Dr. Michael Pena. Patient was placed in abduction pillow immediately after the procedure. Patient was able to ambulate after sedation had worn off. He states he is completely pain-free. He is neurovascularly intact. Postreduction x-ray shows successful reduction. Provided ambulatory referral to orthopedic surgery, advised patient to follow standard post reduction instructions. Return precautions given, all questions answered. Amount and/or Complexity of Data Reviewed  Radiology: ordered and independent interpretation performed. Risk  Prescription drug management.             Disposition  Final diagnoses:   Closed posterior dislocation of hip, left, initial encounter [facility])     Time reflects when diagnosis was documented in both MDM as applicable and the Disposition within this note     Time User Action Codes Description Comment    7/14/2023  9:44 PM Joshua Hatfield Add [S73.015A] Closed posterior dislocation of hip, left, initial encounter [facility])       ED Disposition     ED Disposition   Discharge    Condition   Stable Date/Time   Fri Jul 14, 2023  9:44 PM    [address] discharge to home/self care. Follow-up Information     Follow up With Specialties Details Why Contact Info Additional Information    Raleigh Pendleton MD Family Medicine   [address] [place] [address] [phone]       [address] [place] [address] Emergency Department Emergency Medicine Go to  If symptoms worsen or if you have any other specific concerns [address] [id number]  [facility] Emergency Department, [address] [place], [place], [address] [facility] Orthopedic Surgery Call today To be re-evaluated for your symptoms [address] [facility], [address] [place], [place], [address]  Use Entrance A           Discharge Medication List as of 7/14/2023  9:45 PM      CONTINUE these medications which have NOT CHANGED    Details   aspirin (ECOTRIN LOW STRENGTH) 81 mg EC tablet Take 81 mg by mouth daily, Historical Med      clotrimazole-betamethasone (LOTRISONE) 1-0.05 % cream Apply topically 2 (two) times a day, Starting Tue 4/25/2023, Normal      losartan-hydrochlorothiazide (HYZAAR) 100-12.5 MG per tablet TAKE ONE TABLET BY MOUTH EVERY DAY, Normal      montelukast (SINGULAIR) 10 mg tablet TAKE ONE TABLET BY MOUTH EVERY DAY, Normal      Multiple Vitamin (MULTIVITAMIN) tablet Take 1 tablet by mouth daily, Historical Med      tamsulosin (FLOMAX) 0.4 mg Take 1 capsule (0.4 mg total) by mouth daily with dinner, Starting Wed 9/28/2022, Normal               PDMP Review     None           ED Provider  Attending physically available and evaluated Cleveland Roman. I managed the patient along with the ED Attending.     Electronically Signed by         Marie Weldon MD  07/14/23 1925

## 2023-07-15 NOTE — ED PROCEDURE NOTE
Procedure  Pre-Procedural Sedation    Performed by: Karri Alcantar DO  Authorized by: Karri Alcantar DO    Consent:     Consent obtained:  Written    Consent given by:  Patient    Risks discussed: Allergic reaction, dysrhythmia, inadequate sedation, prolonged hypoxia resulting in organ damage, prolonged sedation necessitating reversal, respiratory compromise necessitating ventilatory assistance and intubation, vomiting and nausea  Universal protocol:     Procedure explained and questions answered to patient or proxy's satisfaction: yes      Relevant documents present and verified: yes      Test results available and properly labeled: yes      Radiology Images displayed and confirmed.   If images not available, report reviewed: yes      Required blood products, implants, devices, and special equipment available: yes      Site/side marked: yes      Immediately prior to procedure a time out was called: yes      Patient identity confirmation method:  Verbally with patient  Indications:     Sedation purpose:  Dislocation reduction    Procedure necessitating sedation performed by:  Different physician    Intended level of sedation:  Moderate (conscious sedation)  Pre-sedation assessment:     NPO status caution: urgency dictates proceeding with non-ideal NPO status      Neck mobility: normal      Mouth opening:  3 or more finger widths    Thyromental distance:  3 finger widths    Mallampati score:  II - soft palate, uvula, fauces visible    Pre-sedation assessments completed and reviewed: airway patency, cardiovascular function, hydration status, mental status, nausea/vomiting, pain level, respiratory function and temperature      History of difficult intubation: no      Pre-sedation assessment completed:  7/14/2023 8:30 PM  Procedural Sedation    Date/Time: 7/14/2023 10:29 PM    Performed by: Karri Alcantar DO  Authorized by: Karri Alcantar DO    Immediate pre-procedure details:     Reassessment: Patient reassessed immediately prior to procedure      Reviewed: vital signs      Verified: bag valve mask available, emergency equipment available, intubation equipment available, IV patency confirmed, oxygen available, reversal medications available and suction available    Procedure details (see MAR for exact dosages):     Sedation start time:  7/14/2023 9:02 PM    Preoxygenation:  Room air    Sedation:  Propofol (80mg)    Intra-procedure monitoring:  Blood pressure monitoring, continuous capnometry, cardiac monitor, continuous pulse oximetry, frequent vital sign checks and frequent LOC assessments    Intra-procedure events: hypoxia      Intra-procedure management:  Airway repositioning, BVM ventilation and supplemental oxygen    Sedation end time:  7/14/2023 9:15 PM    Total sedation time (minutes):  13  Post-procedure details:     Post-sedation assessment completed:  7/14/2023 10:17 PM    Attendance: Constant attendance by certified staff until patient recovered      Recovery: Patient returned to pre-procedure baseline      Post-sedation assessments completed and reviewed: airway patency, cardiovascular function, hydration status, mental status, nausea/vomiting, pain level, respiratory function and temperature      Patient is stable for discharge or admission: yes      Patient tolerance:   Tolerated well, no immediate complications                     Иван Blanchard DO  07/14/23 1612

## 2023-07-15 NOTE — DISCHARGE INSTRUCTIONS
Please follow orthopedic instructions to avoid future dislocations. Please follow up with orthopedic surgery. Please return to the emergency department if you develop any new or concerning symptoms such as increased pain in your hip, numbness in your leg, or other concerning symptoms.

## 2023-07-28 ENCOUNTER — TELEPHONE (OUTPATIENT)
Dept: OBGYN CLINIC | Facility: HOSPITAL | Age: 78
End: 2023-07-28

## 2023-07-28 NOTE — TELEPHONE ENCOUNTER
LM for patient to schedule an appointment for his left hip    History of LT total hip replacement 4 years go in [place], approx 10 dislocations since then. [facility] [phone]     [address] [place] [address] forms scanned into chart.

## 2023-08-02 DIAGNOSIS — B37.2 CANDIDAL DERMATITIS: ICD-10-CM

## 2023-08-02 DIAGNOSIS — J30.1 SEASONAL ALLERGIC RHINITIS DUE TO POLLEN: ICD-10-CM

## 2023-08-02 RX ORDER — CLOTRIMAZOLE AND BETAMETHASONE DIPROPIONATE 10; .64 MG/G; MG/G
CREAM TOPICAL 2 TIMES DAILY
Qty: 45 G | Refills: 0 | Status: SHIPPED | OUTPATIENT
Start: 2023-08-02

## 2023-08-02 RX ORDER — MONTELUKAST SODIUM 10 MG/1
TABLET ORAL
Qty: 90 TABLET | Refills: 0 | Status: SHIPPED | OUTPATIENT
Start: 2023-08-02

## 2023-08-23 ENCOUNTER — OFFICE VISIT (OUTPATIENT)
Dept: OBGYN CLINIC | Facility: HOSPITAL | Age: 78
End: 2023-08-23
Payer: COMMERCIAL

## 2023-08-23 ENCOUNTER — TELEPHONE (OUTPATIENT)
Dept: OBGYN CLINIC | Facility: HOSPITAL | Age: 78
End: 2023-08-23

## 2023-08-23 VITALS
HEIGHT: 62 IN | SYSTOLIC BLOOD PRESSURE: 183 MMHG | WEIGHT: 180.3 LBS | DIASTOLIC BLOOD PRESSURE: 76 MMHG | HEART RATE: 55 BPM | BODY MASS INDEX: 33.18 KG/M2

## 2023-08-23 DIAGNOSIS — Z96.642 HISTORY OF TOTAL LEFT HIP ARTHROPLASTY: Primary | ICD-10-CM

## 2023-08-23 PROCEDURE — 99213 OFFICE O/P EST LOW 20 MIN: CPT | Performed by: ORTHOPAEDIC SURGERY

## 2023-08-23 NOTE — TELEPHONE ENCOUNTER
Attempted to call pt-but no answer. ----- Message from Corie Rossi sent at 8/22/2023  4:50 PM EDT -----  Regarding: NP appt  Please reschedule patient please. Thank you!  ----- Message -----  From: Eneida Garsia MA  Sent: 8/22/2023   8:55 AM EDT  To: Wesley Harada. We recommend Dr. Sarahi Pantoja. He does not require surgical records from his total hip. Dr. Zeke Celaya requires them.   ----- Message -----  From: Jojo Hill MD  Sent: 8/19/2023   8:49 PM EDT  To: [facility]; #    Hello all    This patient is scheduled to see me this Weds 8/23 at [facility] clinic. 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     He needs to be rescheduled with Dr. Sarahi Pantoja, Dr. Odilia Gambino, or Dr. Zeke Celaya for evaluation of his prosthetic    Thanks  Sina Garvin

## 2023-08-23 NOTE — PROGRESS NOTES
Orthopaedics Office Visit - New Patient Visit    ASSESSMENT/PLAN:    Assessment:   Multiple dislocations L IESHA  Failed abduction brace per patient    Plan:   WBAT LLE  Strict posterior hip precautions  Patient performing home strengthening exercises    Referral to Dr. Anamaria Alonso for managemnet of recurring dislocations - patient states he is interested in constrained liner    To Do Next Visit:  With Dr. Anamaria Alonso    _____________________________________________________  CHIEF COMPLAINT:  Chief Complaint   Patient presents with   • Left Hip - Pain         SUBJECTIVE:  Raymundo Dale is a 66 y.o. male who presents as a new patient with recurring left hip IESHA dislocations. He has been relocated multiple times in the ED and states it has dislocated 10 times. He states adduction and internal rotation are the motions that cause the hip to dislocate. He does perform home exercises to strengthen the muscles aroudn the hip. He states he has tried an abduction brace which he did not tolerate well and did not work. He denies motor or sensory deficits to the LLE. . ISsue is isolated to the left hip and denies problems otherwise.     PAST MEDICAL HISTORY:  Past Medical History:   Diagnosis Date   • Hypertension    • Kidney stone    • Obesity        PAST SURGICAL HISTORY:  Past Surgical History:   Procedure Laterality Date   • CHOLECYSTECTOMY     • CLOSED REDUCTION HIP DISLOCATION  9/26/2020        • HERNIA REPAIR     • JOINT REPLACEMENT     • LEG SURGERY         FAMILY HISTORY:  Family History   Problem Relation Age of Onset   • Coronary artery disease Father    • Coronary artery disease Mother    • Heart failure Mother        SOCIAL HISTORY:  Social History     Tobacco Use   • Smoking status: Never   • Smokeless tobacco: Never   Vaping Use   • Vaping Use: Never used   Substance Use Topics   • Alcohol use: No     Comment: occ   • Drug use: No       MEDICATIONS:    Current Outpatient Medications:   •  aspirin (ECOTRIN LOW STRENGTH) 81 mg EC tablet, Take 81 mg by mouth daily, Disp: , Rfl:   •  clotrimazole-betamethasone (LOTRISONE) 1-0.05 % cream, Apply topically 2 (two) times a day, Disp: 45 g, Rfl: 0  •  losartan-hydrochlorothiazide (HYZAAR) 100-12.5 MG per tablet, TAKE ONE TABLET BY MOUTH EVERY DAY, Disp: 90 tablet, Rfl: 1  •  montelukast (SINGULAIR) 10 mg tablet, TAKE ONE TABLET BY MOUTH EVERY DAY, Disp: 90 tablet, Rfl: 0  •  Multiple Vitamin (MULTIVITAMIN) tablet, Take 1 tablet by mouth daily, Disp: , Rfl:   •  tamsulosin (FLOMAX) 0.4 mg, Take 1 capsule (0.4 mg total) by mouth daily with dinner (Patient not taking: Reported on 4/25/2023), Disp: 90 capsule, Rfl: 3    ALLERGIES:  Allergies   Allergen Reactions   • Epinephrine Other (See Comments)     "didn't feel right and had SOB for months"   • Lidocaine      w/epi   • Procaine        REVIEW OF SYSTEMS:  MSK: left hip pain  Neuro: negative  Pertinent items are otherwise noted in HPI. A comprehensive review of systems was otherwise negative. LABS:  HgA1c:   Lab Results   Component Value Date    HGBA1C 6.3 08/17/2022     BMP:   Lab Results   Component Value Date    GLUCOSE 130 07/21/2015    CALCIUM 9.5 06/18/2021     07/21/2015    K 4.0 06/18/2021    CO2 31 06/18/2021     06/18/2021    BUN 17 06/18/2021    CREATININE 0.91 06/18/2021     CBC: No components found for: "CBC"    _____________________________________________________  PHYSICAL EXAMINATION:  Vital signs: BP (!) 183/76   Pulse 55   Ht 5' 2" (1.575 m)   Wt 81.8 kg (180 lb 4.8 oz)   BMI 32.98 kg/m²   General: No acute distress, awake and alert  Psychiatric: Mood and affect appear appropriate  HEENT: Trachea Midline, No torticollis, no apparent facial trauma  Cardiovascular: No audible murmurs;  Extremities appear perfused  Pulmonary: No audible wheezing or stridor  Skin: No open lesions; see further details (if any) below    MUSCULOSKELETAL EXAMINATION:  Extremities:  LLE:  Full ROM not tested  Intact hip flexion and abduction  Extremity well perfused  No open skin lesions  Thigh and calf compressible      _____________________________________________________  STUDIES REVIEWED:  I personally reviewed the images and interpretation is as follows:  Plain films reveal well located total hip arthroplasty, no fracture    PROCEDURES PERFORMED:  Procedures    Chidi Mora MD

## 2023-08-26 PROBLEM — R73.03 PREDIABETES: Status: ACTIVE | Noted: 2018-10-13

## 2023-08-29 ENCOUNTER — OFFICE VISIT (OUTPATIENT)
Dept: FAMILY MEDICINE CLINIC | Facility: CLINIC | Age: 78
End: 2023-08-29

## 2023-08-29 VITALS
OXYGEN SATURATION: 96 % | SYSTOLIC BLOOD PRESSURE: 136 MMHG | HEIGHT: 62 IN | TEMPERATURE: 96.8 F | HEART RATE: 64 BPM | WEIGHT: 183.4 LBS | BODY MASS INDEX: 33.75 KG/M2 | DIASTOLIC BLOOD PRESSURE: 80 MMHG | RESPIRATION RATE: 18 BRPM

## 2023-08-29 DIAGNOSIS — N40.1 BPH ASSOCIATED WITH NOCTURIA: ICD-10-CM

## 2023-08-29 DIAGNOSIS — I10 PRIMARY HYPERTENSION: ICD-10-CM

## 2023-08-29 DIAGNOSIS — E66.9 OBESITY (BMI 30.0-34.9): ICD-10-CM

## 2023-08-29 DIAGNOSIS — Z00.00 WELL ADULT EXAM: Primary | ICD-10-CM

## 2023-08-29 DIAGNOSIS — E78.5 DYSLIPIDEMIA: ICD-10-CM

## 2023-08-29 DIAGNOSIS — R35.1 BPH ASSOCIATED WITH NOCTURIA: ICD-10-CM

## 2023-08-29 DIAGNOSIS — R73.03 PREDIABETES: ICD-10-CM

## 2023-08-29 PROCEDURE — 99397 PER PM REEVAL EST PAT 65+ YR: CPT | Performed by: FAMILY MEDICINE

## 2023-08-29 NOTE — ASSESSMENT & PLAN NOTE
Patient stopped taking Flomax 0.4 mg on his own because it did not provide any significant improvement in urinary frequency and nocturia. Continue follow-up with urology.

## 2023-08-29 NOTE — PROGRESS NOTES
[facility]    NAME: Rose Johnson  AGE: 66 y.o. SEX: male  : 1945     DATE: 2023   Chief Complaint   Patient presents with   • Physical Exam     Annual check up      Health Maintenance   Topic Date Due   • COVID-19 Vaccine (1) Never done   • Pneumococcal Vaccine: 65+ Years (1 - PCV) Never done   • Fall Risk  2022   • BMI: Followup Plan  2023   • Annual Physical  2023   • Influenza Vaccine (1) 2023   • Hepatitis C Screening  2024 (Originally 1945)   • DTaP,Tdap,and Td Vaccines (2 - Td or Tdap) 2023   • Depression Screening  2024   • BMI: Adult  2024   • HIB Vaccine  Aged Out   • IPV Vaccine  Aged Out   • Hepatitis A Vaccine  Aged Out   • Meningococcal ACWY Vaccine  Aged Out   • HPV Vaccine  Aged Out          Assessment and Plan:     Problem List Items Addressed This Visit        Cardiovascular and Mediastinum    Primary hypertension     BP remains stable. Continue Losartan/ HCTZ 100/12.5 mg daily. Relevant Orders    CBC and differential    Comprehensive metabolic panel       Other    Obesity (BMI 30.0-34. 9)     Patient lost 17 lb since last visit in 2022. Recommended to continue regular exercise, working on dietary modifications, losing weight. Prediabetes     Hb A1c checked at [facility] in 2022 was 6.3. Recommended to follow a low-carb diet, continue regular exercise. Relevant Orders    Comprehensive metabolic panel    Hemoglobin A1C    Well adult exam - Primary    Dyslipidemia     Follow a low cholesterol, low fat diet, regular exercise. Check labs. Relevant Orders    Comprehensive metabolic panel    Lipid panel    BPH associated with nocturia     Patient stopped taking Flomax 0.4 mg on his own because it did not provide any significant improvement in urinary frequency and nocturia. Continue follow-up with urology. Immunizations and preventive care screenings were discussed with patient today. Appropriate education was printed on patient's after visit summary. Counseling:  Alcohol/drug use: discussed moderation in alcohol intake, the recommendations for healthy alcohol use, and avoidance of illicit drug use. Dental Health: discussed importance of regular tooth brushing, flossing, and dental visits. Injury prevention: discussed safety/seat belts, safety helmets, smoke detectors, carbon dioxide detectors, and smoking near bedding or upholstery. · Exercise: the importance of regular exercise/physical activity was discussed. Recommend exercise 3-5 times per week for at least 30 minutes. BMI Counseling: Body mass index is 33.54 kg/m². The BMI is above normal. Nutrition recommendations include decreasing portion sizes, encouraging healthy choices of fruits and vegetables, decreasing fast food intake, consuming healthier snacks, limiting drinks that contain sugar, moderation in carbohydrate intake, increasing intake of lean protein, reducing intake of saturated and trans fat and reducing intake of cholesterol. Exercise recommendations include moderate physical activity 150 minutes/week and exercising 3-5 times per week. No pharmacotherapy was ordered. Rationale for BMI follow-up plan is due to patient being overweight or obese. Depression Screening and Follow-up Plan: Patient was screened for depression during today's encounter. They screened negative with a PHQ-2 score of 0. Return in about 1 year (around 8/29/2024) for Annual physical.     Chief Complaint:     Chief Complaint   Patient presents with   • Physical Exam     Annual check up       History of Present Illness:     Adult Annual Physical   Patient here for a comprehensive physical exam.     Patient continues working. He lives in [place] from November till March-April, followed by [place] physician in [place].     He states that had blood test done at [facility] in [place] in 2/2023, will bring a copy to review. PMHx: HTN, Obesity, Prediabetes, Dyslipidemia, BPH with nocturia, OA. Reviewed current medications. HTN  - takes Losartan/HCTZ 100/12.5 mg 1 tablet daily. Denies chest pain, shortness of breath, dizziness. Patient was seen by orthopedic surgeon Dr. Kurtis Wilson on August 23, 2023 for recurrent left hip IESHA dislocations. Reports no falls. Ambulates without assistive device. Dr. Kurtis Wilson referred patient to Dr. Candie Barbour for management of recurrent dislocations. Denies tobacco use. Drinks 1 glass of wine with dinner. Refusing immunizations. Patient was seen by urology in April 2023, declined scheduling cystoscopy. Stopped taking Flomax. Diet and Physical Activity  · Diet/Nutrition: limited junk food, consuming 3-5 servings of fruits/vegetables daily and adequate fiber intake. · Exercise: walking, strength training exercises and 3-4 times a week on average. Depression Screening  PHQ-2/9 Depression Screening    Little interest or pleasure in doing things: 0 - not at all  Feeling down, depressed, or hopeless: 0 - not at all  PHQ-2 Score: 0  PHQ-2 Interpretation: Negative depression screen       General Health  · Sleep: sleeps poorly. · Hearing: hearing loss  · Vision: no vision problems. · Dental: has upper dentures.  Health  · Symptoms include: nocturia     Review of Systems:     Review of Systems   Constitutional: Negative for activity change, appetite change, chills, fatigue and fever. HENT: Positive for hearing loss. Negative for congestion, ear pain, sore throat and trouble swallowing. Eyes: Negative for pain, discharge, redness, itching and visual disturbance. Respiratory: Negative for cough, chest tightness, shortness of breath and wheezing. Cardiovascular: Negative for chest pain, palpitations and leg swelling.    Gastrointestinal: Negative for abdominal pain, blood in stool, constipation, diarrhea, nausea and vomiting. Genitourinary: Negative for difficulty urinating, dysuria and hematuria. Nocturia x 3   Musculoskeletal: Positive for arthralgias (left hip) and gait problem. Negative for back pain and joint swelling. Skin: Negative for rash. Neurological: Negative for dizziness, syncope and headaches. Hematological: Negative. Psychiatric/Behavioral: Positive for sleep disturbance. Negative for dysphoric mood. The patient is not nervous/anxious.        Past Medical History:     Past Medical History:   Diagnosis Date   • Hypertension    • Kidney stone    • Obesity       Past Surgical History:     Past Surgical History:   Procedure Laterality Date   • CHOLECYSTECTOMY     • CLOSED REDUCTION HIP DISLOCATION  9/26/2020        • HERNIA REPAIR     • JOINT REPLACEMENT     • LEG SURGERY        Family History:     Family History   Problem Relation Age of Onset   • Coronary artery disease Father    • Coronary artery disease Mother    • Heart failure Mother       Social History:     Social History     Socioeconomic History   • Marital status:      Spouse name: None   • Number of children: None   • Years of education: None   • Highest education level: None   Occupational History   • None   Tobacco Use   • Smoking status: Never   • Smokeless tobacco: Never   Vaping Use   • Vaping Use: Never used   Substance and Sexual Activity   • Alcohol use: No     Comment: occ   • Drug use: No   • Sexual activity: None   Other Topics Concern   • None   Social History Narrative   • None     Social Determinants of Health     Financial Resource Strain: Not on file   Food Insecurity: Not on file   Transportation Needs: Not on file   Physical Activity: Not on file   Stress: Not on file   Social Connections: Not on file   Intimate Partner Violence: Not on file   Housing Stability: Not on file      Current Medications:     Current Outpatient Medications   Medication Sig Dispense Refill   • aspirin (ECOTRIN LOW STRENGTH) 81 mg EC tablet Take 81 mg by mouth daily     • clotrimazole-betamethasone (LOTRISONE) 1-0.05 % cream Apply topically 2 (two) times a day 45 g 0   • losartan-hydrochlorothiazide (HYZAAR) 100-12.5 MG per tablet TAKE ONE TABLET BY MOUTH EVERY DAY 90 tablet 1   • montelukast (SINGULAIR) 10 mg tablet TAKE ONE TABLET BY MOUTH EVERY DAY 90 tablet 0   • Multiple Vitamin (MULTIVITAMIN) tablet Take 1 tablet by mouth daily       No current facility-administered medications for this visit. Allergies: Allergies   Allergen Reactions   • Epinephrine Other (See Comments)     "didn't feel right and had SOB for months"   • Lidocaine      w/epi   • Procaine       Physical Exam:     /80 (BP Location: Left arm, Patient Position: Sitting, Cuff Size: Large)   Pulse 64   Temp (!) 96.8 °F (36 °C) (Tympanic)   Resp 18   Ht 5' 2" (1.575 m)   Wt 83.2 kg (183 lb 6.4 oz)   SpO2 96%   BMI 33.54 kg/m²     Physical Exam  Vitals and nursing note reviewed. Constitutional:       Appearance: Normal appearance. He is obese. HENT:      Head: Normocephalic and atraumatic. Eyes:      Conjunctiva/sclera: Conjunctivae normal.      Pupils: Pupils are equal, round, and reactive to light. Neck:      Vascular: No carotid bruit. Cardiovascular:      Rate and Rhythm: Normal rate and regular rhythm. Heart sounds: No murmur heard. Pulmonary:      Effort: Pulmonary effort is normal.      Breath sounds: Normal breath sounds. Abdominal:      General: There is no distension. Palpations: Abdomen is soft. Tenderness: There is no abdominal tenderness. Musculoskeletal:         General: No swelling. Cervical back: Normal range of motion and neck supple. Right lower leg: No edema. Left lower leg: No edema. Comments: Antalgic gait     Skin:     General: Skin is warm and dry. Findings: No rash. Neurological:      General: No focal deficit present.       Mental Status: He is alert and oriented to person, place, and time. Motor: No weakness.    Psychiatric:         Mood and Affect: Mood normal.          Mukul Negron MD  [address] [place] [address]

## 2023-08-29 NOTE — ASSESSMENT & PLAN NOTE
Patient lost 17 lb since last visit in 8/2022. Recommended to continue regular exercise, working on dietary modifications, losing weight.

## 2023-08-29 NOTE — ASSESSMENT & PLAN NOTE
Hb A1c checked at [facility] in August 2022 was 6.3. Recommended to follow a low-carb diet, continue regular exercise.

## 2023-09-05 ENCOUNTER — OFFICE VISIT (OUTPATIENT)
Dept: OBGYN CLINIC | Facility: MEDICAL CENTER | Age: 78
End: 2023-09-05
Payer: COMMERCIAL

## 2023-09-05 ENCOUNTER — APPOINTMENT (OUTPATIENT)
Dept: LAB | Facility: MEDICAL CENTER | Age: 78
End: 2023-09-05
Payer: COMMERCIAL

## 2023-09-05 ENCOUNTER — APPOINTMENT (OUTPATIENT)
Dept: RADIOLOGY | Facility: MEDICAL CENTER | Age: 78
End: 2023-09-05
Payer: COMMERCIAL

## 2023-09-05 VITALS
HEIGHT: 62 IN | HEART RATE: 58 BPM | SYSTOLIC BLOOD PRESSURE: 164 MMHG | BODY MASS INDEX: 34.45 KG/M2 | DIASTOLIC BLOOD PRESSURE: 69 MMHG | WEIGHT: 187.2 LBS

## 2023-09-05 DIAGNOSIS — Z96.642 HISTORY OF TOTAL LEFT HIP ARTHROPLASTY: ICD-10-CM

## 2023-09-05 DIAGNOSIS — I10 PRIMARY HYPERTENSION: ICD-10-CM

## 2023-09-05 DIAGNOSIS — E78.5 DYSLIPIDEMIA: ICD-10-CM

## 2023-09-05 DIAGNOSIS — T84.029A RECURRENT DISLOCATION OF HIP JOINT PROSTHESIS, INITIAL ENCOUNTER (HCC): ICD-10-CM

## 2023-09-05 DIAGNOSIS — Z96.649 RECURRENT DISLOCATION OF HIP JOINT PROSTHESIS, INITIAL ENCOUNTER (HCC): Primary | ICD-10-CM

## 2023-09-05 DIAGNOSIS — R73.03 PREDIABETES: ICD-10-CM

## 2023-09-05 DIAGNOSIS — T84.029A RECURRENT DISLOCATION OF HIP JOINT PROSTHESIS, INITIAL ENCOUNTER (HCC): Primary | ICD-10-CM

## 2023-09-05 DIAGNOSIS — Z96.649 RECURRENT DISLOCATION OF HIP JOINT PROSTHESIS, INITIAL ENCOUNTER (HCC): ICD-10-CM

## 2023-09-05 LAB
ALBUMIN SERPL BCP-MCNC: 4.2 G/DL (ref 3.5–5)
ALP SERPL-CCNC: 68 U/L (ref 34–104)
ALT SERPL W P-5'-P-CCNC: 10 U/L (ref 7–52)
ANION GAP SERPL CALCULATED.3IONS-SCNC: 9 MMOL/L
AST SERPL W P-5'-P-CCNC: 18 U/L (ref 13–39)
BILIRUB SERPL-MCNC: 0.42 MG/DL (ref 0.2–1)
BUN SERPL-MCNC: 16 MG/DL (ref 5–25)
CALCIUM SERPL-MCNC: 9.7 MG/DL (ref 8.4–10.2)
CHLORIDE SERPL-SCNC: 103 MMOL/L (ref 96–108)
CHOLEST SERPL-MCNC: 201 MG/DL
CO2 SERPL-SCNC: 28 MMOL/L (ref 21–32)
CREAT SERPL-MCNC: 0.84 MG/DL (ref 0.6–1.3)
CRP SERPL QL: 3.4 MG/L
GFR SERPL CREATININE-BSD FRML MDRD: 83 ML/MIN/1.73SQ M
GLUCOSE P FAST SERPL-MCNC: 148 MG/DL (ref 65–99)
HDLC SERPL-MCNC: 52 MG/DL
LDLC SERPL CALC-MCNC: 115 MG/DL (ref 0–100)
NONHDLC SERPL-MCNC: 149 MG/DL
POTASSIUM SERPL-SCNC: 4.3 MMOL/L (ref 3.5–5.3)
PROT SERPL-MCNC: 7.1 G/DL (ref 6.4–8.4)
SODIUM SERPL-SCNC: 140 MMOL/L (ref 135–147)
TRIGL SERPL-MCNC: 169 MG/DL

## 2023-09-05 PROCEDURE — 99214 OFFICE O/P EST MOD 30 MIN: CPT | Performed by: STUDENT IN AN ORGANIZED HEALTH CARE EDUCATION/TRAINING PROGRAM

## 2023-09-05 PROCEDURE — 83018 HEAVY METAL QUAN EACH NES: CPT

## 2023-09-05 PROCEDURE — 80061 LIPID PANEL: CPT

## 2023-09-05 PROCEDURE — 86140 C-REACTIVE PROTEIN: CPT

## 2023-09-05 PROCEDURE — 36415 COLL VENOUS BLD VENIPUNCTURE: CPT

## 2023-09-05 PROCEDURE — 80053 COMPREHEN METABOLIC PANEL: CPT

## 2023-09-05 PROCEDURE — 85025 COMPLETE CBC W/AUTO DIFF WBC: CPT

## 2023-09-05 PROCEDURE — 83036 HEMOGLOBIN GLYCOSYLATED A1C: CPT

## 2023-09-05 PROCEDURE — 73522 X-RAY EXAM HIPS BI 3-4 VIEWS: CPT

## 2023-09-05 PROCEDURE — 82495 ASSAY OF CHROMIUM: CPT

## 2023-09-05 PROCEDURE — 85652 RBC SED RATE AUTOMATED: CPT

## 2023-09-05 NOTE — PROGRESS NOTES
Hip New Office Note    Assessment:     1. Recurrent dislocation of hip joint prosthesis, initial encounter ([address])    2. History of total left hip arthroplasty        Plan:    We discussed the patient has recurrent left hip instability status post left total hip arthroplasty with early dislocation in the postop that has become more frequent. We discussed that his acetabular component appears in acceptable position but does have a leg length discrepancy with the left leg shorter than the right at the level of the hip. We discussed that on the sitting standing films he does have a stiff lumbar spine. We discussed that we must first rule out other causes aside from spinopelvic parameters causing instability. We will order an ESR and CRP along with cobalt and chromium levels. Patient was sent from a partner and had previously discussed conversion to a locked liner. We discussed that this is a last resort in regards to bearing choice. Primary plan would be conversion to dual mobility construct with lengthening of his left leg for soft tissue tensioning as he is short on that side. A constrained liner would be an option pending his stability intraoperatively which is all pending his preoperative work-up. 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 Patient will follow-up in a few weeks to go over results. Problem List Items Addressed This Visit        Other    History of total left hip arthroplasty   Other Visit Diagnoses     Recurrent dislocation of hip joint prosthesis, initial encounter ([address])    -  Primary    Relevant Orders    Sedimentation rate, automated    C-reactive protein    Cobalt    Chromium level         Subjective:     Patient ID: Rose Johnson is a 66 y.o. male.   Chief Complaint: left hip recurrent dislocation  HPI: Patient has history of left total hip arthroplasty reportedly in 2018 performed in [place]. About 4 days after surgery he had first dislocation event. Usually occurs when he bends forward at hip or lets leg cross midline. He does not describe any pain preceding these events but has had repeated instability events since the first, totaling 10 times. He denies numbness tingling weakness fevers chills nausea vomiting. He has only had one surgery on left hip. He wants to follow up here because he isnt in [place] long enough each year to get revision surgery there. He denies significant medical problems denies blood thinner use or diabetes. Denies smoking history. He has had physical therapy for this problem and had to take tylenol and motrin for pain in the past but not recently. He says his limb lengths feel equal.      Allergy:  Allergies   Allergen Reactions   • Epinephrine Other (See Comments)     "didn't feel right and had SOB for months"   • Lidocaine      w/epi   • Procaine      Medications:  all current active meds have been reviewed and current meds:   No current facility-administered medications for this visit.      Past Medical History:  Past Medical History:   Diagnosis Date   • Hypertension    • Kidney stone    • Obesity      Past Surgical History:  Past Surgical History:   Procedure Laterality Date   • CHOLECYSTECTOMY     • CLOSED REDUCTION HIP DISLOCATION  9/26/2020        • HERNIA REPAIR     • JOINT REPLACEMENT     • LEG SURGERY       Family History:  Family History   Problem Relation Age of Onset   • Coronary artery disease Father    • Coronary artery disease Mother    • Heart failure Mother      Social History:  Social History     Substance and Sexual Activity   Alcohol Use No    Comment: occ     Social History     Substance and Sexual Activity   Drug Use No     Social History     Tobacco Use   Smoking Status Never   Smokeless Tobacco Never           ROS:  General: Per HPI  Skin: Negative, except if noted below  HEENT: Negative  Respiratory: Negative  Cardiovascular: Negative  Gastrointestinal: Negative  Urinary: Negative  Vascular: Negative  Musculoskeletal: Positive per HPI   Neurologic: Positive per HPI  Endocrine: Negative    Objective:  BP Readings from Last 1 Encounters:   09/05/23 164/69      Wt Readings from Last 1 Encounters:   09/05/23 84.9 kg (187 lb 3.2 oz)        Respiratory:   non-labored respirations    Lymphatics:  no palpable lymph nodes    Gait and Station:   Intoeing with gait, no lurch    Neurologic:   Alert and oriented times 3  Patient with normal sensation except as noted below  Deep tendon reflexes 2+ except as noted in MSK exam    Bilateral Lower Extremity:  Left Hip     Inspection: skin intact incisions well healed without erythema or induration    Range of Motion: 90 flexion to full extension without pain, IR ER not tested due to frequent dislocations    - log roll    - Trendelenburg sign    Motor: 5/5 Q/HS/TA/GS/P    Pulses: 2+ DP foot warm    SILT DP/SP/S/S/TN    Imaging:  My interpretation XR AP pelvis standing/sitting: s/p press-fit trilock/pinnacle total hip arthroplasty. Acetabular component appears in appropriate anteversion/abduction. LLE shorter that right at the hip level. No fracture or dislocation. Significant lumbar DDD/DJD. BMI:   Estimated body mass index is 34.24 kg/m² as calculated from the following:    Height as of this encounter: 5' 2" (1.575 m). Weight as of this encounter: 84.9 kg (187 lb 3.2 oz). BSA:   Estimated body surface area is 1.86 meters squared as calculated from the following:    Height as of this encounter: 5' 2" (1.575 m). Weight as of this encounter: 84.9 kg (187 lb 3.2 oz).            Scribe Attestation    I,:   am acting as a scribe while in the presence of the attending physician.:       I,:   personally performed the services described in this documentation    as scribed in my presence.:

## 2023-09-06 LAB
BASOPHILS # BLD AUTO: 0.11 THOUSANDS/ÂΜL (ref 0–0.1)
BASOPHILS NFR BLD AUTO: 1 % (ref 0–1)
EOSINOPHIL # BLD AUTO: 0.15 THOUSAND/ÂΜL (ref 0–0.61)
EOSINOPHIL NFR BLD AUTO: 2 % (ref 0–6)
ERYTHROCYTE [DISTWIDTH] IN BLOOD BY AUTOMATED COUNT: 13.6 % (ref 11.6–15.1)
ERYTHROCYTE [SEDIMENTATION RATE] IN BLOOD: 11 MM/HOUR (ref 0–19)
EST. AVERAGE GLUCOSE BLD GHB EST-MCNC: 128 MG/DL
HBA1C MFR BLD: 6.1 %
HCT VFR BLD AUTO: 40.7 % (ref 36.5–49.3)
HGB BLD-MCNC: 13.4 G/DL (ref 12–17)
IMM GRANULOCYTES # BLD AUTO: 0.03 THOUSAND/UL (ref 0–0.2)
IMM GRANULOCYTES NFR BLD AUTO: 0 % (ref 0–2)
LYMPHOCYTES # BLD AUTO: 1.37 THOUSANDS/ÂΜL (ref 0.6–4.47)
LYMPHOCYTES NFR BLD AUTO: 16 % (ref 14–44)
MCH RBC QN AUTO: 30.5 PG (ref 26.8–34.3)
MCHC RBC AUTO-ENTMCNC: 32.9 G/DL (ref 31.4–37.4)
MCV RBC AUTO: 93 FL (ref 82–98)
MONOCYTES # BLD AUTO: 0.6 THOUSAND/ÂΜL (ref 0.17–1.22)
MONOCYTES NFR BLD AUTO: 7 % (ref 4–12)
NEUTROPHILS # BLD AUTO: 6.27 THOUSANDS/ÂΜL (ref 1.85–7.62)
NEUTS SEG NFR BLD AUTO: 74 % (ref 43–75)
NRBC BLD AUTO-RTO: 0 /100 WBCS
PLATELET # BLD AUTO: 219 THOUSANDS/UL (ref 149–390)
PMV BLD AUTO: 11.1 FL (ref 8.9–12.7)
RBC # BLD AUTO: 4.39 MILLION/UL (ref 3.88–5.62)
WBC # BLD AUTO: 8.53 THOUSAND/UL (ref 4.31–10.16)

## 2023-09-08 LAB — COBALT SERPL-MCNC: <1 UG/L (ref 0–0.9)

## 2023-09-11 ENCOUNTER — TELEPHONE (OUTPATIENT)
Dept: FAMILY MEDICINE CLINIC | Facility: CLINIC | Age: 78
End: 2023-09-11

## 2023-09-11 LAB — CR SERPL-MCNC: 0.5 UG/L (ref 0.1–2.1)

## 2023-09-11 NOTE — TELEPHONE ENCOUNTER
Left Message ----- Message from Yon Johns MD sent at 9/8/2023 11:11 AM EDT -----  Please call patient with blood test results. Kidney function test, electrolytes, liver enzymes, blood count - within normal range. Fasting blood sugar is elevated at 148 ( N )  Hemoglobin A1C  6.1 indicating prediabetes. Recommend patient to follow a low-carb diet. Avoid starchy foods, concentrated sweets, increase exercise. Total cholesterol, LDL are borderline elevated. Triglycerides 169 ( normal below 150) Recommend to follow a low-cholesterol, low-fat diet.

## 2023-09-23 DIAGNOSIS — B37.2 CANDIDAL DERMATITIS: ICD-10-CM

## 2023-09-23 DIAGNOSIS — J30.1 SEASONAL ALLERGIC RHINITIS DUE TO POLLEN: ICD-10-CM

## 2023-09-23 DIAGNOSIS — I10 ESSENTIAL HYPERTENSION: ICD-10-CM

## 2023-09-23 RX ORDER — LOSARTAN POTASSIUM AND HYDROCHLOROTHIAZIDE 12.5; 1 MG/1; MG/1
TABLET ORAL
Qty: 90 TABLET | Refills: 0 | Status: SHIPPED | OUTPATIENT
Start: 2023-09-23

## 2023-09-23 RX ORDER — MONTELUKAST SODIUM 10 MG/1
TABLET ORAL
Qty: 90 TABLET | Refills: 0 | Status: SHIPPED | OUTPATIENT
Start: 2023-09-23

## 2023-09-23 RX ORDER — CLOTRIMAZOLE AND BETAMETHASONE DIPROPIONATE 10; .64 MG/G; MG/G
CREAM TOPICAL 2 TIMES DAILY
Qty: 45 G | Refills: 0 | Status: SHIPPED | OUTPATIENT
Start: 2023-09-23

## 2023-10-25 PROBLEM — Z00.00 WELL ADULT EXAM: Status: RESOLVED | Noted: 2018-10-15 | Resolved: 2023-10-25

## 2024-02-12 DIAGNOSIS — I10 ESSENTIAL HYPERTENSION: ICD-10-CM

## 2024-02-12 DIAGNOSIS — J30.1 SEASONAL ALLERGIC RHINITIS DUE TO POLLEN: ICD-10-CM

## 2024-02-12 RX ORDER — MONTELUKAST SODIUM 10 MG/1
TABLET ORAL
Qty: 90 TABLET | Refills: 0 | Status: SHIPPED | OUTPATIENT
Start: 2024-02-12

## 2024-02-12 RX ORDER — LOSARTAN POTASSIUM AND HYDROCHLOROTHIAZIDE 12.5; 1 MG/1; MG/1
TABLET ORAL
Qty: 90 TABLET | Refills: 0 | Status: SHIPPED | OUTPATIENT
Start: 2024-02-12

## 2024-06-03 DIAGNOSIS — J30.1 SEASONAL ALLERGIC RHINITIS DUE TO POLLEN: ICD-10-CM

## 2024-06-03 DIAGNOSIS — I10 ESSENTIAL HYPERTENSION: ICD-10-CM

## 2024-06-04 RX ORDER — LOSARTAN POTASSIUM AND HYDROCHLOROTHIAZIDE 12.5; 1 MG/1; MG/1
TABLET ORAL
Qty: 90 TABLET | Refills: 0 | Status: SHIPPED | OUTPATIENT
Start: 2024-06-04

## 2024-06-04 RX ORDER — MONTELUKAST SODIUM 10 MG/1
TABLET ORAL
Qty: 90 TABLET | Refills: 0 | Status: SHIPPED | OUTPATIENT
Start: 2024-06-04

## 2024-06-25 ENCOUNTER — APPOINTMENT (EMERGENCY)
Dept: RADIOLOGY | Facility: HOSPITAL | Age: 79
End: 2024-06-25
Payer: COMMERCIAL

## 2024-06-25 ENCOUNTER — HOSPITAL ENCOUNTER (OUTPATIENT)
Facility: HOSPITAL | Age: 79
Setting detail: OBSERVATION
Discharge: HOME | End: 2024-06-26
Attending: EMERGENCY MEDICINE | Admitting: INTERNAL MEDICINE
Payer: COMMERCIAL

## 2024-06-25 DIAGNOSIS — R20.0 NUMBNESS: ICD-10-CM

## 2024-06-25 DIAGNOSIS — R09.89 CHOKING EPISODE: ICD-10-CM

## 2024-06-25 DIAGNOSIS — R26.2 AMBULATORY DYSFUNCTION: Primary | ICD-10-CM

## 2024-06-25 PROBLEM — R42 LIGHTHEADEDNESS: Status: ACTIVE | Noted: 2024-06-25

## 2024-06-25 LAB
2HR DELTA HS TROPONIN: 1 NG/L
4HR DELTA HS TROPONIN: 1 NG/L
ALBUMIN SERPL BCG-MCNC: 4.3 G/DL (ref 3.5–5)
ALP SERPL-CCNC: 56 U/L (ref 34–104)
ALT SERPL W P-5'-P-CCNC: 11 U/L (ref 7–52)
ANION GAP SERPL CALCULATED.3IONS-SCNC: 11 MMOL/L (ref 4–13)
AST SERPL W P-5'-P-CCNC: 20 U/L (ref 13–39)
BASOPHILS # BLD AUTO: 0.1 THOUSANDS/ÂΜL (ref 0–0.1)
BASOPHILS NFR BLD AUTO: 1 % (ref 0–1)
BILIRUB SERPL-MCNC: 0.94 MG/DL (ref 0.2–1)
BILIRUB UR QL STRIP: NEGATIVE
BUN SERPL-MCNC: 18 MG/DL (ref 5–25)
CALCIUM SERPL-MCNC: 9.8 MG/DL (ref 8.4–10.2)
CARDIAC TROPONIN I PNL SERPL HS: 4 NG/L
CARDIAC TROPONIN I PNL SERPL HS: 5 NG/L
CARDIAC TROPONIN I PNL SERPL HS: 5 NG/L
CHLORIDE SERPL-SCNC: 102 MMOL/L (ref 96–108)
CLARITY UR: CLEAR
CO2 SERPL-SCNC: 25 MMOL/L (ref 21–32)
COLOR UR: YELLOW
CREAT SERPL-MCNC: 0.89 MG/DL (ref 0.6–1.3)
EOSINOPHIL # BLD AUTO: 0.34 THOUSAND/ÂΜL (ref 0–0.61)
EOSINOPHIL NFR BLD AUTO: 4 % (ref 0–6)
ERYTHROCYTE [DISTWIDTH] IN BLOOD BY AUTOMATED COUNT: 13.7 % (ref 11.6–15.1)
FLUAV RNA RESP QL NAA+PROBE: NEGATIVE
FLUBV RNA RESP QL NAA+PROBE: NEGATIVE
GFR SERPL CREATININE-BSD FRML MDRD: 81 ML/MIN/1.73SQ M
GLUCOSE SERPL-MCNC: 116 MG/DL (ref 65–140)
GLUCOSE UR STRIP-MCNC: NEGATIVE MG/DL
HCT VFR BLD AUTO: 36.3 % (ref 36.5–49.3)
HGB BLD-MCNC: 12.1 G/DL (ref 12–17)
HGB UR QL STRIP.AUTO: NEGATIVE
IMM GRANULOCYTES # BLD AUTO: 0.03 THOUSAND/UL (ref 0–0.2)
IMM GRANULOCYTES NFR BLD AUTO: 0 % (ref 0–2)
KETONES UR STRIP-MCNC: NEGATIVE MG/DL
LEUKOCYTE ESTERASE UR QL STRIP: NEGATIVE
LIPASE SERPL-CCNC: 7 U/L (ref 11–82)
LYMPHOCYTES # BLD AUTO: 1.15 THOUSANDS/ÂΜL (ref 0.6–4.47)
LYMPHOCYTES NFR BLD AUTO: 14 % (ref 14–44)
MCH RBC QN AUTO: 30.9 PG (ref 26.8–34.3)
MCHC RBC AUTO-ENTMCNC: 33.3 G/DL (ref 31.4–37.4)
MCV RBC AUTO: 93 FL (ref 82–98)
MONOCYTES # BLD AUTO: 0.64 THOUSAND/ÂΜL (ref 0.17–1.22)
MONOCYTES NFR BLD AUTO: 8 % (ref 4–12)
NEUTROPHILS # BLD AUTO: 6.22 THOUSANDS/ÂΜL (ref 1.85–7.62)
NEUTS SEG NFR BLD AUTO: 73 % (ref 43–75)
NITRITE UR QL STRIP: NEGATIVE
NRBC BLD AUTO-RTO: 0 /100 WBCS
PH UR STRIP.AUTO: 7 [PH]
PLATELET # BLD AUTO: 236 THOUSANDS/UL (ref 149–390)
PLATELET # BLD AUTO: 286 THOUSANDS/UL (ref 149–390)
PMV BLD AUTO: 10.9 FL (ref 8.9–12.7)
PMV BLD AUTO: 9.9 FL (ref 8.9–12.7)
POTASSIUM SERPL-SCNC: 3.5 MMOL/L (ref 3.5–5.3)
PROT SERPL-MCNC: 7.2 G/DL (ref 6.4–8.4)
PROT UR STRIP-MCNC: NEGATIVE MG/DL
RBC # BLD AUTO: 3.92 MILLION/UL (ref 3.88–5.62)
RSV RNA RESP QL NAA+PROBE: NEGATIVE
SARS-COV-2 RNA RESP QL NAA+PROBE: NEGATIVE
SODIUM SERPL-SCNC: 138 MMOL/L (ref 135–147)
SP GR UR STRIP.AUTO: 1.01 (ref 1–1.03)
TSH SERPL DL<=0.05 MIU/L-ACNC: 1.57 UIU/ML (ref 0.45–4.5)
UROBILINOGEN UR STRIP-ACNC: <2 MG/DL
WBC # BLD AUTO: 8.48 THOUSAND/UL (ref 4.31–10.16)

## 2024-06-25 PROCEDURE — 81003 URINALYSIS AUTO W/O SCOPE: CPT

## 2024-06-25 PROCEDURE — 80053 COMPREHEN METABOLIC PANEL: CPT | Performed by: EMERGENCY MEDICINE

## 2024-06-25 PROCEDURE — 99284 EMERGENCY DEPT VISIT MOD MDM: CPT

## 2024-06-25 PROCEDURE — 36415 COLL VENOUS BLD VENIPUNCTURE: CPT | Performed by: EMERGENCY MEDICINE

## 2024-06-25 PROCEDURE — 83690 ASSAY OF LIPASE: CPT | Performed by: EMERGENCY MEDICINE

## 2024-06-25 PROCEDURE — 84484 ASSAY OF TROPONIN QUANT: CPT

## 2024-06-25 PROCEDURE — 85025 COMPLETE CBC W/AUTO DIFF WBC: CPT | Performed by: EMERGENCY MEDICINE

## 2024-06-25 PROCEDURE — 99223 1ST HOSP IP/OBS HIGH 75: CPT | Performed by: HOSPITALIST

## 2024-06-25 PROCEDURE — 0241U HB NFCT DS VIR RESP RNA 4 TRGT: CPT

## 2024-06-25 PROCEDURE — 71045 X-RAY EXAM CHEST 1 VIEW: CPT

## 2024-06-25 PROCEDURE — 99285 EMERGENCY DEPT VISIT HI MDM: CPT | Performed by: EMERGENCY MEDICINE

## 2024-06-25 PROCEDURE — 93005 ELECTROCARDIOGRAM TRACING: CPT

## 2024-06-25 PROCEDURE — 84443 ASSAY THYROID STIM HORMONE: CPT

## 2024-06-25 PROCEDURE — 85049 AUTOMATED PLATELET COUNT: CPT

## 2024-06-25 RX ORDER — MONTELUKAST SODIUM 10 MG/1
10 TABLET ORAL DAILY
Status: DISCONTINUED | OUTPATIENT
Start: 2024-06-26 | End: 2024-06-26 | Stop reason: HOSPADM

## 2024-06-25 RX ORDER — ENOXAPARIN SODIUM 100 MG/ML
40 INJECTION SUBCUTANEOUS DAILY
Status: DISCONTINUED | OUTPATIENT
Start: 2024-06-26 | End: 2024-06-26 | Stop reason: HOSPADM

## 2024-06-25 RX ORDER — HYDROMORPHONE HCL IN WATER/PF 6 MG/30 ML
0.2 PATIENT CONTROLLED ANALGESIA SYRINGE INTRAVENOUS EVERY 2 HOUR PRN
Status: DISCONTINUED | OUTPATIENT
Start: 2024-06-25 | End: 2024-06-26 | Stop reason: HOSPADM

## 2024-06-25 RX ORDER — OXYCODONE HYDROCHLORIDE 5 MG/1
5 TABLET ORAL EVERY 4 HOURS PRN
Status: DISCONTINUED | OUTPATIENT
Start: 2024-06-25 | End: 2024-06-26 | Stop reason: HOSPADM

## 2024-06-25 RX ORDER — ACETAMINOPHEN 325 MG/1
650 TABLET ORAL EVERY 4 HOURS PRN
Status: DISCONTINUED | OUTPATIENT
Start: 2024-06-25 | End: 2024-06-26 | Stop reason: HOSPADM

## 2024-06-25 RX ORDER — HYDROCHLOROTHIAZIDE 12.5 MG/1
12.5 TABLET ORAL DAILY
Status: DISCONTINUED | OUTPATIENT
Start: 2024-06-26 | End: 2024-06-25

## 2024-06-25 RX ORDER — LOSARTAN POTASSIUM 50 MG/1
100 TABLET ORAL DAILY
Status: DISCONTINUED | OUTPATIENT
Start: 2024-06-26 | End: 2024-06-25

## 2024-06-25 RX ADMIN — ASPIRIN 81 MG: 81 TABLET, COATED ORAL at 21:59

## 2024-06-25 RX ADMIN — SODIUM CHLORIDE 500 ML: 0.9 INJECTION, SOLUTION INTRAVENOUS at 13:41

## 2024-06-25 NOTE — H&P
[facility]  H&P  Name: Jarad Beavers 79 y.o. male I MRN: [id number]  Unit/Bed#: -01 I Date of Admission: 6/25/2024   Date of Service: 6/25/2024 I Hospital Day: 0      Assessment & Plan   * Lightheadedness  Assessment & Plan  Patient presenting with sudden phlegm production, lightheadedness, and numbness.    Patient's symptoms resolved after presenting to the hospital.    Physical exam showed intact cranial nerves II through XII and no focal deficits.    Currently low suspicion for intracranial abnormality  Of note patient had recent left hip surgery  Possibly due to orthostasis but etiology at this time is not find.     Plan:  Will place on neurochecks.  Can consider IV fluids if orthostasis present  Obtain orthostatics  Placed on neurochecks every 4 hours  Can consider CT head if new neuro symptoms present    Primary hypertension  Assessment & Plan  Hold home meds  Will add PRN hydralazine for any episodes of hypertension          VTE Pharmacologic Prophylaxis: VTE Score: 11 Moderate Risk (Score 3-4) - Pharmacological DVT Prophylaxis Ordered: enoxaparin (Lovenox).  Code Status: Level 1 - Full Code   Discussion with family: Patient declined call to .     Anticipated Length of Stay: Patient will be admitted on an observation basis with an anticipated length of stay of less than 2 midnights secondary to lightheaded.    Chief Complaint: Lightheaded and phlegm production    History of Present Illness:  Jarad Beavers is a 79 y.o. male with a PMH of hypertension, obesity, left hip arthroplasty on 06/18 who presents with lightheadedness and phlegm coming from nose and mouth.  Patient states that earlier today he was eating walnuts and grapes when he out of nowhere began having large amounts of phlegm production.  He described this as clear without blood.  He states that nothing like this has happened to him before.  He is stating that he did not choke on a grape or walnut but  felt that there was a burp stuck in his throat.  This is no longer present.  He also reports around this time he got up and felt lightheaded. He did not lose consciousness or strike his head.  He also states that at the same time he was experiencing numbness in his upper and lower extremities that he has never felt before.  He did not describe this is complete numbness only partial numbness.  This is since resolved since coming to the emergency department.  He called 911 due to the concerns surrounding his lightheadedness.    Workup in the emergency department was unremarkable.  Vital signs were within normal limits, labs were that within normal limits, chest x-ray showed no acute cardiopulmonary abnormalities.    Review of Systems:  Review of Systems   Constitutional:  Negative for chills, fatigue and fever.   HENT:  Positive for congestion, postnasal drip and rhinorrhea.    Respiratory:  Negative for cough and shortness of breath.    Cardiovascular:  Negative for chest pain, palpitations and leg swelling.   Gastrointestinal:  Negative for abdominal distention, abdominal pain, constipation, diarrhea, nausea and vomiting.   Genitourinary:  Negative for dysuria, flank pain, frequency, hematuria and testicular pain.   Neurological:  Positive for light-headedness. Negative for dizziness, seizures, syncope, weakness and headaches.       Past Medical and Surgical History:   Past Medical History:   Diagnosis Date    Hypertension     Kidney stone     Obesity        Past Surgical History:   Procedure Laterality Date    CHOLECYSTECTOMY      CLOSED REDUCTION HIP DISLOCATION  9/26/2020         HERNIA REPAIR      JOINT REPLACEMENT      LEG SURGERY         Meds/Allergies:  Prior to Admission medications    Medication Sig Start Date End Date Taking? Authorizing Provider   aspirin (ECOTRIN LOW STRENGTH) 81 mg EC tablet Take 81 mg by mouth daily    Historical Provider, MD   clotrimazole-betamethasone (LOTRISONE) 1-0.05 % cream  "Apply topically 2 (two) times a day 9/23/23   Chantel Campbell MD   losartan-hydrochlorothiazide (HYZAAR) 100-12.5 MG per tablet TAKE ONE TABLET BY MOUTH EVERY DAY 6/4/24   Chantel Campbell MD   montelukast (SINGULAIR) 10 mg tablet TAKE ONE TABLET BY MOUTH EVERY DAY 6/4/24   Chantel Campbell MD   Multiple Vitamin (MULTIVITAMIN) tablet Take 1 tablet by mouth daily    Historical Provider, MD     I have reviewed home medications with patient personally.    Allergies:   Allergies   Allergen Reactions    Epinephrine Other (See Comments)     \"didn't feel right and had SOB for months\"    Lidocaine      w/epi    Procaine        Social History:  Marital Status:    Occupation: retired  Patient Pre-hospital Living Situation: Home  Patient Pre-hospital Level of Mobility: walks with walker  Patient Pre-hospital Diet Restrictions: None  Substance Use History:   Social History     Substance and Sexual Activity   Alcohol Use No    Comment: occ     Social History     Tobacco Use   Smoking Status Never   Smokeless Tobacco Never     Social History     Substance and Sexual Activity   Drug Use No       Family History:  Family History   Problem Relation Age of Onset    Coronary artery disease Father     Coronary artery disease Mother     Heart failure Mother        Physical Exam:     Vitals:   Blood Pressure: 154/70 (06/25/24 1715)  Pulse: 57 (06/25/24 1715)  Temperature: 98.2 °F (36.8 °C) (06/25/24 1216)  Temp Source: Oral (06/25/24 1216)  Respirations: 18 (06/25/24 1216)  Weight - Scale: 87.9 kg (193 lb 12.6 oz) (06/25/24 1216)  SpO2: 99 % (06/25/24 1715)    Physical Exam  Constitutional:       Appearance: Normal appearance.   Cardiovascular:      Rate and Rhythm: Normal rate and regular rhythm.      Pulses: Normal pulses.      Heart sounds: Normal heart sounds.   Pulmonary:      Effort: Pulmonary effort is normal.      Breath sounds: Normal breath sounds.   Abdominal:      General: Bowel sounds are normal.      " Tenderness: There is no abdominal tenderness.   Musculoskeletal:      Right lower leg: No edema.      Left lower leg: Edema present.   Neurological:      General: No focal deficit present.      Mental Status: He is alert and oriented to person, place, and time. Mental status is at baseline.          Additional Data:     Lab Results:  Results from last 7 days   Lab Units 06/25/24  1235   WBC Thousand/uL 8.48   HEMOGLOBIN g/dL 12.1   HEMATOCRIT % 36.3*   PLATELETS Thousands/uL 286   SEGS PCT % 73   LYMPHO PCT % 14   MONO PCT % 8   EOS PCT % 4     Results from last 7 days   Lab Units 06/25/24  1308   SODIUM mmol/L 138   POTASSIUM mmol/L 3.5   CHLORIDE mmol/L 102   CO2 mmol/L 25   BUN mg/dL 18   CREATININE mg/dL 0.89   ANION GAP mmol/L 11   CALCIUM mg/dL 9.8   ALBUMIN g/dL 4.3   TOTAL BILIRUBIN mg/dL 0.94   ALK PHOS U/L 56   ALT U/L 11   AST U/L 20   GLUCOSE RANDOM mg/dL 116             Lab Results   Component Value Date    HGBA1C 6.2 (H) 05/29/2024    HGBA1C 6.1 (H) 09/05/2023    HGBA1C 6.3 08/17/2022           Lines/Drains:  Invasive Devices       Peripheral Intravenous Line  Duration             Peripheral IV 06/25/24 Distal;Right;Upper;Ventral (anterior) Arm <1 day                        Imaging: Reviewed radiology reports from this admission including: chest xray  XR chest 1 view   Final Result by Nga Hirsch MD (06/25 7318)      No acute pulmonary pathology.      Increase in the size of the cardiac silhouette since 2015.                  Workstation performed: [id number]         XR chest 1 view portable   ED Interpretation by Magnus Thakur MD (06/25 1456)   Per my independent interpretation: No acute cardiopulmonary process      Final Result by Nga Hirsch MD (06/25 4556)      No acute pulmonary pathology.      Increase in the size of the cardiac silhouette since 2015.                  Workstation performed: [id number]             EKG and Other Studies Reviewed on Admission:   EKG: Personally  Reviewed.    ** Please Note: This note has been constructed using a voice recognition system. **

## 2024-06-25 NOTE — ED ATTENDING ATTESTATION
"6/25/2024  I, Magnus Thakur MD, saw and evaluated the patient. I have discussed the patient with the resident/non-physician practitioner and agree with the resident's/non-physician practitioner's findings, Plan of Care, and MDM as documented in the resident's/non-physician practitioner's note, except where noted. All available labs and Radiology studies were reviewed.  I was present for key portions of any procedure(s) performed by the resident/non-physician practitioner and I was immediately available to provide assistance.       At this point I agree with the current assessment done in the Emergency Department.  I have conducted an independent evaluation of this patient a history and physical is as follows:    History    Patient is a 79-year-old male, with a history significant for hypertension obesity per my review of medical record, who presents to the ED today, via EMS, for evaluation of sudden onset \" phlegm\" that was coming out of his mouth and nose after eating a grape and walnut.  Patient unable to further characterize if this was coughing up material or vomiting material.  Patient denies globus sensation, chest pain, dyspnea, abdominal pain, urinary symptoms, weakness, fever.  He states that he was in his usual state of health earlier today until he ate the walnut and grape experiences symptoms.  Since then, he reports fatigue as well as full body numbness that he is unable to further characterize (such as paresthesias or absence of sensation like when he had a spinal performed).  Patient at this time reports resolution of phlegm.     Per patient's son, providing collateral history, patient is not confused.    Patient is without other concerns at this time.     ROS  Patient denies: Fever; dysphagia; vision change; chest pain; dyspnea; abdominal pain; polyuria; dysuria; rash; weakness; difficulty walking; confusion    Physical Exam    GENERAL APPEARANCE: NAD  NEURO: Cranial nerves 2-12 intact.  " "5/5 strength in all four extremities including finger extension against resistance.  Sensation to light touch subjectively intact/equal in all four extremities and the face.    Patient is speaking clearly in complete sentences.  Patient is answering appropriately and able to follow commands.   HEENT: PERRL, Moist mucous membranes, external ears normal, nose normal  Neck: No cervical adenopathy  CV: RRR. No murmurs, rubs, gallops  LUNGS: Clear to auscultation: No wheezes, stridor, rhonchi, rales  GI: Abdomen non-distended. Soft. Non-tender and without rebound or guarding   : Deferred at this time  MSK: No deformity.   Skin: Warm and dry  Capillary refill: <2 seconds    Patient is currently afebrile and hemodynamically stable.    Assessment/Plan/MDM  \"Phlegm,\" full body numbness?,  Fatigue  -Concern for ACS versus electrolyte abnormality versus anxiety/hyperventilation/transient hypocalcemia versus aspiration.  Pancreatitis also considered.  Patient at risk for pneumonia/UTI.  No reason to suspect foreign body in the esophagus or trachea at this time based on history physical exam.  -Will investigate with cardiac workup, lipase  -Will manage with fluids and further based on workup    ED Course  ED Course as of 06/25/24 1817 Tue Jun 25, 2024   1229 ECG per my independent interpretation: Normal rate, regular rhythm, no ectopy, leftward axis, no ST elevations or depressions     1248 WBC: 8.48  WNL   1317 hs TnI 0hr: 4  WNL   1318 Hemoglobin: 12.1  WNL   1340 Leukocytes, UA: Negative  WNL   1340 Nitrite, UA: Negative  WNL   1551 On reevaluation, patient continues to report \" full body numbness\" but he is unable to further characterize what this means.  He denies back pain and there is no midline spinous process tenderness throughout the midline C, T, L-spine.  Patient continues to remain conversational.  Intact sharp dull differentiation in all 4 extremities.  Intact 2-point discrimination in all 4 extremities.  No " pronator drift.  2/4 patellar reflexes bilaterally.  Patient able to ambulate but states he is not ambulating well compared to baseline         Critical Care Time  Procedures

## 2024-06-25 NOTE — ED PROVIDER NOTES
"History  Chief Complaint   Patient presents with    Medical Problem     Patient reports episode of phlegm, feeling lightheaded and leg swelling since this morning.     HPI  (Jarad Beavers) Jarad Beavers is a 79 y.o. male     They presented to the emergency department on June 26, 2024.   Chief Complaint   Patient presents with    Medical Problem     Patient reports episode of phlegm, feeling lightheaded and leg swelling since this morning.   .    The patient states that while eating grapes and walnut he started having phlegm coming out of his nose and his mouth.  Patient is unable to describe it further at this time.  Unsure if it was vomiting versus coughing.  He reports associated body fatigue afterwards and \"full body numbness\".  Patient states he is unable to describe or further characterize the numbness as paresthesias or absence of sensation like when he had a spinal performed.  Patient reports resolution of phlegm.  Patient states it is not more significantly swollen than normal.  Patient denies fever, chills, CP, SOB, FB sensation, abd pain, nausea, vomiting, bowel/bladder changes , or any other complaint at this time.       Prior to Admission Medications   Prescriptions Last Dose Informant Patient Reported? Taking?   Multiple Vitamin (MULTIVITAMIN) tablet  Self Yes Yes   Sig: Take 1 tablet by mouth daily   aspirin (ECOTRIN LOW STRENGTH) 81 mg EC tablet  Self Yes No   Sig: Take 81 mg by mouth daily   clotrimazole-betamethasone (LOTRISONE) 1-0.05 % cream   No No   Sig: Apply topically 2 (two) times a day   losartan-hydrochlorothiazide (HYZAAR) 100-12.5 MG per tablet   No Yes   Sig: TAKE ONE TABLET BY MOUTH EVERY DAY   Patient taking differently: 2 tablets   montelukast (SINGULAIR) 10 mg tablet   No Yes   Sig: TAKE ONE TABLET BY MOUTH EVERY DAY      Facility-Administered Medications: None       Past Medical History:   Diagnosis Date    Hypertension     Kidney stone     Obesity        Past Surgical History: "   Procedure Laterality Date    CHOLECYSTECTOMY      CLOSED REDUCTION HIP DISLOCATION  9/26/2020         HERNIA REPAIR      JOINT REPLACEMENT      LEG SURGERY         Family History   Problem Relation Age of Onset    Coronary artery disease Father     Coronary artery disease Mother     Heart failure Mother      I have reviewed and agree with the history as documented.    E-Cigarette/Vaping    E-Cigarette Use Never User      E-Cigarette/Vaping Substances    Nicotine No     THC No     CBD No     Flavoring No     Other No     Unknown No      Social History     Tobacco Use    Smoking status: Never    Smokeless tobacco: Never   Vaping Use    Vaping status: Never Used   Substance Use Topics    Alcohol use: No     Comment: occ    Drug use: No        Review of Systems   Constitutional:  Positive for fatigue. Negative for chills and fever.   HENT:  Positive for rhinorrhea. Negative for ear pain and sore throat.    Eyes:  Negative for pain and visual disturbance.   Respiratory:  Positive for cough (?? unsure, see HPI). Negative for shortness of breath.    Cardiovascular:  Negative for chest pain and palpitations.   Gastrointestinal:  Positive for vomiting (?? unsure, see HPI). Negative for abdominal pain, constipation, diarrhea and nausea.   Genitourinary:  Negative for dysuria and hematuria.   Musculoskeletal:  Negative for arthralgias and back pain.   Skin:  Negative for color change and rash.   Neurological:  Positive for weakness (global weakness). Negative for seizures and syncope.   All other systems reviewed and are negative.      Physical Exam  ED Triage Vitals   Temperature Pulse Respirations Blood Pressure SpO2   06/25/24 1216 06/25/24 1216 06/25/24 1216 06/25/24 1218 06/25/24 1216   98.2 °F (36.8 °C) 70 18 170/86 99 %      Temp Source Heart Rate Source Patient Position - Orthostatic VS BP Location FiO2 (%)   06/25/24 1216 06/25/24 1715 06/25/24 2235 06/25/24 1715 --   Oral Monitor Lying Left arm       Pain Score        24 2030       No Pain             Orthostatic Vital Signs  Vitals:    24 0726 24 0905 24 0907 24 0908   BP: (!) 173/79 164/72 157/70 (!) 173/74   Pulse: 60      Patient Position - Orthostatic VS: Lying Lying - Orthostatic VS Sitting Standing       Physical Exam  Vitals and nursing note reviewed.   Constitutional:       General: He is not in acute distress.     Appearance: He is well-developed.   HENT:      Head: Normocephalic and atraumatic.   Eyes:      Conjunctiva/sclera: Conjunctivae normal.   Cardiovascular:      Rate and Rhythm: Normal rate and regular rhythm.      Heart sounds: No murmur heard.  Pulmonary:      Effort: Pulmonary effort is normal. No respiratory distress.      Breath sounds: Normal breath sounds.   Abdominal:      Palpations: Abdomen is soft.      Tenderness: There is no abdominal tenderness.   Musculoskeletal:         General: No swelling.      Cervical back: Neck supple.      Right lower le+ Pitting Edema present.      Left lower le+ Pitting Edema present.   Skin:     General: Skin is warm and dry.      Capillary Refill: Capillary refill takes less than 2 seconds.   Neurological:      Mental Status: He is alert and oriented to person, place, and time.      Cranial Nerves: Cranial nerves 2-12 are intact.      Sensory: Sensation is intact.      Motor: Motor function is intact.      Coordination: Coordination is intact.      Comments: Patient has intact to point discrimination, intact sharp and dull discrimination.   Psychiatric:         Mood and Affect: Mood normal.         ED Medications  Medications   sodium chloride 0.9 % bolus 500 mL (0 mL Intravenous Stopped 24 1411)       Diagnostic Studies  Results Reviewed       Procedure Component Value Units Date/Time    HS Troponin I 4hr [[id number]]  (Normal) Collected: 24 1715    Lab Status: Final result Specimen: Blood from Arm, Right Updated: 24 1919     hs TnI 4hr 5 ng/L      Delta 4hr  hsTnI 1 ng/L     HS Troponin I 2hr [[id number]]  (Normal) Collected: 06/25/24 1541    Lab Status: Final result Specimen: Blood from Arm, Right Updated: 06/25/24 1617     hs TnI 2hr 5 ng/L      Delta 2hr hsTnI 1 ng/L     [place] draw [[id number]] Collected: 06/25/24 1235    Lab Status: Final result Specimen: Blood from Arm, Left Updated: 06/25/24 1401    Narrative:      The following orders were created for panel order [place] draw.  Procedure                               Abnormality         Status                     ---------                               -----------         ------                     Light Blue Top on hold[[id number]]                           Final result               Green / Yellow tube on hold[[id number]]                                                   Please view results for these tests on the individual orders.    Comprehensive metabolic panel [[id number]] Collected: 06/25/24 1308    Lab Status: Final result Specimen: Blood from Arm, Right Updated: 06/25/24 1350     Sodium 138 mmol/L      Potassium 3.5 mmol/L      Chloride 102 mmol/L      CO2 25 mmol/L      ANION GAP 11 mmol/L      BUN 18 mg/dL      Creatinine 0.89 mg/dL      Glucose 116 mg/dL      Calcium 9.8 mg/dL      AST 20 U/L      ALT 11 U/L      Alkaline Phosphatase 56 U/L      Total Protein 7.2 g/dL      Albumin 4.3 g/dL      Total Bilirubin 0.94 mg/dL      eGFR 81 ml/min/1.73sq m     Narrative:      National Kidney Disease Foundation guidelines for Chronic Kidney Disease (CKD):     Stage 1 with normal or high GFR (GFR > 90 mL/min/1.73 square meters)    Stage 2 Mild CKD (GFR = 60-89 mL/min/1.73 square meters)    Stage 3A Moderate CKD (GFR = 45-59 mL/min/1.73 square meters)    Stage 3B Moderate CKD (GFR = 30-44 mL/min/1.73 square meters)    Stage 4 Severe CKD (GFR = 15-29 mL/min/1.73 square meters)    Stage 5 End Stage CKD (GFR <15 mL/min/1.73 square meters)  Note: GFR calculation is accurate only with a steady state creatinine     Lipase [[id number]]  (Abnormal) Collected: 06/25/24 1308    Lab Status: Final result Specimen: Blood from Arm, Right Updated: 06/25/24 1350     Lipase 7 u/L     UA w Reflex to Microscopic w Reflex to Culture [[id number]] Collected: 06/25/24 1309    Lab Status: Final result Specimen: Urine, Clean Catch Updated: 06/25/24 1338     Color, UA Yellow     Clarity, UA Clear     Specific Gravity, UA 1.012     pH, UA 7.0     Leukocytes, UA Negative     Nitrite, UA Negative     Protein, UA Negative mg/dl      Glucose, UA Negative mg/dl      Ketones, UA Negative mg/dl      Urobilinogen, UA <2.0 mg/dl      Bilirubin, UA Negative     Occult Blood, UA Negative    FLU/RSV/COVID - if FLU/RSV clinically relevant [[id number]]  (Normal) Collected: 06/25/24 1235    Lab Status: Final result Specimen: Nares from Nose Updated: 06/25/24 1325     SARS-CoV-2 Negative     INFLUENZA A PCR Negative     INFLUENZA B PCR Negative     RSV PCR Negative    Narrative:      FOR PEDIATRIC PATIENTS - copy/paste COVID Guidelines URL to browser: [email or web address]    SARS-CoV-2 assay is a Nucleic Acid Amplification assay intended for the  qualitative detection of nucleic acid from SARS-CoV-2 in nasopharyngeal  swabs. Results are for the presumptive identification of SARS-CoV-2 RNA.    Positive results are indicative of infection with SARS-CoV-2, the virus  causing COVID-19, but do not rule out bacterial infection or co-infection  with other viruses. Laboratories within the [place] and its  territories are required to report all positive results to the appropriate  public health authorities. Negative results do not preclude SARS-CoV-2  infection and should not be used as the sole basis for treatment or other  patient management decisions. Negative results must be combined with  clinical observations, patient history, and epidemiological information.  This test has not been FDA cleared or  approved.    This test has been authorized by FDA under an Emergency Use Authorization  (EUA). This test is only authorized for the duration of time the  declaration that circumstances exist justifying the authorization of the  emergency use of an in vitro diagnostic tests for detection of SARS-CoV-2  virus and/or diagnosis of COVID-19 infection under section 564(b)(1) of  the Act, 21 U.S.C. 360bbb-3(b)(1), unless the authorization is terminated  or revoked sooner. The test has been validated but independent review by FDA  and CLIA is pending.    Test performed using [employer] GeneXpert: This RT-PCR assay targets N2,  a region unique to SARS-CoV-2. A conserved region in the E-gene was chosen  for pan-Sarbecovirus detection which includes SARS-CoV-2.    According to CMS-2020-01-R, this platform meets the definition of high-throughput technology.    HS Troponin 0hr (reflex protocol) [[id number]]  (Normal) Collected: 06/25/24 1235    Lab Status: Final result Specimen: Blood from Arm, Left Updated: 06/25/24 1308     hs TnI 0hr 4 ng/L     CBC and differential [[id number]]  (Abnormal) Collected: 06/25/24 1235    Lab Status: Final result Specimen: Blood from Arm, Left Updated: 06/25/24 1246     WBC 8.48 Thousand/uL      RBC 3.92 Million/uL      Hemoglobin 12.1 g/dL      Hematocrit 36.3 %      MCV 93 fL      MCH 30.9 pg      MCHC 33.3 g/dL      RDW 13.7 %      MPV 10.9 fL      Platelets 286 Thousands/uL      nRBC 0 /100 WBCs      Segmented % 73 %      Immature Grans % 0 %      Lymphocytes % 14 %      Monocytes % 8 %      Eosinophils Relative 4 %      Basophils Relative 1 %      Absolute Neutrophils 6.22 Thousands/µL      Absolute Immature Grans 0.03 Thousand/uL      Absolute Lymphocytes 1.15 Thousands/µL      Absolute Monocytes 0.64 Thousand/µL      Eosinophils Absolute 0.34 Thousand/µL      Basophils Absolute 0.10 Thousands/µL                    XR chest 1 view   Final Result by Nga Hirsch MD (06/25 1448)      No acute  pulmonary pathology.      Increase in the size of the cardiac silhouette since 2015.                  Workstation performed: [id number]         XR chest 1 view portable   ED Interpretation by Magnus Thakur MD (06/25 1320)   Per my independent interpretation: No acute cardiopulmonary process      Final Result by Nga Hirsch MD (06/25 1448)      No acute pulmonary pathology.      Increase in the size of the cardiac silhouette since 2015.                  Workstation performed: [id number]               Procedures  ECG 12 Lead Documentation Only    Date/Time: 6/25/2024 12:30 PM    Performed by: Kailee Jeffers DO  Authorized by: Kailee Jeffers DO    Patient location:  ED  Previous ECG:     Previous ECG:  Compared to current    Comparison ECG info:  2015    Similarity:  Changes noted  Interpretation:     Interpretation: abnormal    Quality:     Tracing quality:  Limited by artifact  Rate:     ECG rate:  70    ECG rate assessment: normal    Rhythm:     Rhythm: sinus rhythm    Ectopy:     Ectopy: none    QRS:     QRS axis:  Left    QRS intervals:  Normal  Conduction:     Conduction: abnormal      Abnormal conduction: LAFB    ST segments:     ST segments:  Normal  T waves:     T waves: normal          ED Course  ED Course as of 06/26/24 1653   Tue Jun 25, 2024   1246 CBC and differential(!)  No leukocytosis or leukopenia.  Hemoglobin hematocrit within normal limits.  Platelets within normal limits.  Reassuring differential.    1311 hs TnI 0hr: 4  EKG does not show any acute ischemic changes, will obtain 2hr troponin   1327 SARS-COV-2: Negative   1327 INFLU A PCR: Negative   1327 INFLU B PCR: Negative   1327 RSV PCR: Negative   1339 UA w Reflex to Microscopic w Reflex to Culture  WNL   1353 LIPASE(!): 7   1353 Comprehensive metabolic panel  WNL   1517 XR chest 1 view     IMPRESSION:     No acute pulmonary pathology.     Increase in the size of the cardiac silhouette since 2015.     1555 Patient  "continues to have \"full body numbness\" but is unable to describe it further. No back pain. Patient is speaking well. Intact sharp and dull differentiation in all 4 extremities. Intact 2-point discrimination in all 4 extremities. Will attempt to ambulate patient    1600 Ambulated patient with walker, patient states that he feels he is not ambulating well.  Patient states that before this episode at home he was able to ambulate better   1600 Patient was able to tolerate water PO   1618 hs TnI 2hr: 5  Delta 1                             SBIRT 20yo+      Flowsheet Row Most Recent Value   Initial Alcohol Screen: US AUDIT-C     1. How often do you have a drink containing alcohol? 0 Filed at: 06/25/2024 1609   2. How many drinks containing alcohol do you have on a typical day you are drinking?  0 Filed at: 06/25/2024 1609   3a. Male UNDER 65: How often do you have five or more drinks on one occasion? 0 Filed at: 06/25/2024 1609   3b. FEMALE Any Age, or MALE 65+: How often do you have 4 or more drinks on one occassion? 0 Filed at: 06/25/2024 1609   Audit-C Score 0 Filed at: 06/25/2024 1609   IVANA: How many times in the past year have you...    Used an illegal drug or used a prescription medication for non-medical reasons? Never Filed at: 06/25/2024 1609                  Medical Decision Making  80 yo M presented to ED for ?choking episode. Associated symptoms: \"fully body numbness,\" lightheadedness, weakness. No FB sensation, CP, SOB. Exam findings: Completely normal neuroexam including intact to point discrimination and intact sharp and dull distinction.  Differentials diagnoses considered: ACS versus gastritis versus pneumonia versus dysrhythmia versus likely disturbance.  See ED course for more details on lab and imaging interpretation.  Based on these results and H&P, ambulatory dysfunction, numbness. Results and clinical impressions were discussed with patient. They expressed understanding. Plan: Admit to medicine for " "further evaluation and management. This plan was also discussed with patient, who was agreeable with this plan. Patient was given the opportunity to ask questions in ED. All questions and concerns were addressed in ED.     Amount and/or Complexity of Data Reviewed  Labs: ordered. Decision-making details documented in ED Course.  Radiology: ordered and independent interpretation performed. Decision-making details documented in ED Course.    Risk  Decision regarding hospitalization.          Disposition  Final diagnoses:   Ambulatory dysfunction   Numbness - \"full body\"   Choking episode     Time reflects when diagnosis was documented in both MDM as applicable and the Disposition within this note       Time User Action Codes Description Comment    6/25/2024  4:22 PM Kailee Jeffers Add [R26.2] Ambulatory dysfunction     6/25/2024  4:22 PM Kailee Jeffers Add [R20.0] Numbness     6/25/2024  4:22 PM Kailee Jeffers Modify [R20.0] Numbness \"full body\"    6/25/2024  4:23 PM Kailee Jeffers Add [R09.89] Choking episode           ED Disposition       ED Disposition   Admit    Condition   Stable    Date/Time   Tue Jun 25, 2024 2647    Comment   Case was discussed with CARLOS and the patient's admission status was agreed to be Admission Status: observation status to the service of Dr. Lima .               Follow-up Information    None         Discharge Medication List as of 6/26/2024 11:38 AM        CONTINUE these medications which have NOT CHANGED    Details   losartan-hydrochlorothiazide (HYZAAR) 100-12.5 MG per tablet TAKE ONE TABLET BY MOUTH EVERY DAY, Normal      montelukast (SINGULAIR) 10 mg tablet TAKE ONE TABLET BY MOUTH EVERY DAY, Normal      Multiple Vitamin (MULTIVITAMIN) tablet Take 1 tablet by mouth daily, Historical Med      aspirin (ECOTRIN LOW STRENGTH) 81 mg EC tablet Take 81 mg by mouth daily, Historical Med      clotrimazole-betamethasone (LOTRISONE) 1-0.05 % cream Apply topically 2 (two) times a " day, Starting Sat 9/23/2023, Normal           No discharge procedures on file.    PDMP Review       None             ED Provider  Attending physically available and evaluated Jarad Beavers. I managed the patient along with the ED Attending.    Electronically Signed by           Kailee Jeffers DO  06/26/24 5490

## 2024-06-26 ENCOUNTER — TRANSITIONAL CARE MANAGEMENT (OUTPATIENT)
Dept: FAMILY MEDICINE CLINIC | Facility: CLINIC | Age: 79
End: 2024-06-26

## 2024-06-26 VITALS
TEMPERATURE: 98.2 F | DIASTOLIC BLOOD PRESSURE: 74 MMHG | SYSTOLIC BLOOD PRESSURE: 173 MMHG | WEIGHT: 193.78 LBS | OXYGEN SATURATION: 96 % | RESPIRATION RATE: 17 BRPM | BODY MASS INDEX: 35.44 KG/M2 | HEART RATE: 60 BPM

## 2024-06-26 LAB
ALBUMIN SERPL BCG-MCNC: 4 G/DL (ref 3.5–5)
ALP SERPL-CCNC: 52 U/L (ref 34–104)
ALT SERPL W P-5'-P-CCNC: 9 U/L (ref 7–52)
ANION GAP SERPL CALCULATED.3IONS-SCNC: 6 MMOL/L (ref 4–13)
APTT PPP: 33 SECONDS (ref 23–37)
AST SERPL W P-5'-P-CCNC: 15 U/L (ref 13–39)
ATRIAL RATE: 70 BPM
BASOPHILS # BLD AUTO: 0.09 THOUSANDS/ÂΜL (ref 0–0.1)
BASOPHILS NFR BLD AUTO: 1 % (ref 0–1)
BILIRUB SERPL-MCNC: 1.02 MG/DL (ref 0.2–1)
BUN SERPL-MCNC: 16 MG/DL (ref 5–25)
CALCIUM SERPL-MCNC: 9.1 MG/DL (ref 8.4–10.2)
CHLORIDE SERPL-SCNC: 103 MMOL/L (ref 96–108)
CO2 SERPL-SCNC: 28 MMOL/L (ref 21–32)
CREAT SERPL-MCNC: 0.88 MG/DL (ref 0.6–1.3)
EOSINOPHIL # BLD AUTO: 0.45 THOUSAND/ÂΜL (ref 0–0.61)
EOSINOPHIL NFR BLD AUTO: 7 % (ref 0–6)
ERYTHROCYTE [DISTWIDTH] IN BLOOD BY AUTOMATED COUNT: 13.6 % (ref 11.6–15.1)
GFR SERPL CREATININE-BSD FRML MDRD: 81 ML/MIN/1.73SQ M
GLUCOSE P FAST SERPL-MCNC: 108 MG/DL (ref 65–99)
GLUCOSE SERPL-MCNC: 108 MG/DL (ref 65–140)
HCT VFR BLD AUTO: 36.8 % (ref 36.5–49.3)
HGB BLD-MCNC: 12.2 G/DL (ref 12–17)
IMM GRANULOCYTES # BLD AUTO: 0.03 THOUSAND/UL (ref 0–0.2)
IMM GRANULOCYTES NFR BLD AUTO: 0 % (ref 0–2)
INR PPP: 0.94 (ref 0.84–1.19)
LYMPHOCYTES # BLD AUTO: 1.34 THOUSANDS/ÂΜL (ref 0.6–4.47)
LYMPHOCYTES NFR BLD AUTO: 20 % (ref 14–44)
MAGNESIUM SERPL-MCNC: 2.2 MG/DL (ref 1.9–2.7)
MCH RBC QN AUTO: 30.8 PG (ref 26.8–34.3)
MCHC RBC AUTO-ENTMCNC: 33.2 G/DL (ref 31.4–37.4)
MCV RBC AUTO: 93 FL (ref 82–98)
MONOCYTES # BLD AUTO: 0.59 THOUSAND/ÂΜL (ref 0.17–1.22)
MONOCYTES NFR BLD AUTO: 9 % (ref 4–12)
NEUTROPHILS # BLD AUTO: 4.37 THOUSANDS/ÂΜL (ref 1.85–7.62)
NEUTS SEG NFR BLD AUTO: 63 % (ref 43–75)
NRBC BLD AUTO-RTO: 0 /100 WBCS
P AXIS: 50 DEGREES
PHOSPHATE SERPL-MCNC: 2.9 MG/DL (ref 2.3–4.1)
PLATELET # BLD AUTO: 182 THOUSANDS/UL (ref 149–390)
PMV BLD AUTO: 10.7 FL (ref 8.9–12.7)
POTASSIUM SERPL-SCNC: 3.4 MMOL/L (ref 3.5–5.3)
PR INTERVAL: 178 MS
PROT SERPL-MCNC: 6.8 G/DL (ref 6.4–8.4)
PROTHROMBIN TIME: 13.2 SECONDS (ref 11.6–14.5)
QRS AXIS: -59 DEGREES
QRSD INTERVAL: 112 MS
QT INTERVAL: 430 MS
QTC INTERVAL: 464 MS
RBC # BLD AUTO: 3.96 MILLION/UL (ref 3.88–5.62)
SODIUM SERPL-SCNC: 137 MMOL/L (ref 135–147)
T WAVE AXIS: 65 DEGREES
VENTRICULAR RATE: 70 BPM
WBC # BLD AUTO: 6.87 THOUSAND/UL (ref 4.31–10.16)

## 2024-06-26 PROCEDURE — 93010 ELECTROCARDIOGRAM REPORT: CPT | Performed by: INTERNAL MEDICINE

## 2024-06-26 PROCEDURE — 80053 COMPREHEN METABOLIC PANEL: CPT

## 2024-06-26 PROCEDURE — 99238 HOSP IP/OBS DSCHRG MGMT 30/<: CPT | Performed by: INTERNAL MEDICINE

## 2024-06-26 PROCEDURE — 85610 PROTHROMBIN TIME: CPT

## 2024-06-26 PROCEDURE — 85730 THROMBOPLASTIN TIME PARTIAL: CPT

## 2024-06-26 PROCEDURE — 83735 ASSAY OF MAGNESIUM: CPT

## 2024-06-26 PROCEDURE — 85025 COMPLETE CBC W/AUTO DIFF WBC: CPT

## 2024-06-26 PROCEDURE — 84100 ASSAY OF PHOSPHORUS: CPT

## 2024-06-26 RX ORDER — POTASSIUM CHLORIDE 14.9 MG/ML
20 INJECTION INTRAVENOUS ONCE
Status: DISCONTINUED | OUTPATIENT
Start: 2024-06-26 | End: 2024-06-26 | Stop reason: HOSPADM

## 2024-06-26 RX ORDER — POTASSIUM CHLORIDE 20 MEQ/1
40 TABLET, EXTENDED RELEASE ORAL ONCE
Status: DISCONTINUED | OUTPATIENT
Start: 2024-06-26 | End: 2024-06-26

## 2024-06-26 RX ORDER — HYDRALAZINE HYDROCHLORIDE 20 MG/ML
5 INJECTION INTRAMUSCULAR; INTRAVENOUS EVERY 6 HOURS PRN
Status: DISCONTINUED | OUTPATIENT
Start: 2024-06-26 | End: 2024-06-26 | Stop reason: HOSPADM

## 2024-06-26 RX ADMIN — B-COMPLEX W/ C & FOLIC ACID TAB 1 TABLET: TAB at 08:14

## 2024-06-26 NOTE — UTILIZATION REVIEW
Initial Clinical Review    Admission: Date/Time/Statement: 6/25/24 1623 observation   Admission Orders (From admission, onward)       Ordered        06/25/24 1623  Place in Observation  Once                          Orders Placed This Encounter   Procedures    Place in Observation     Standing Status:   Standing     Number of Occurrences:   1     Order Specific Question:   Level of Care     Answer:   Med Surg [16]     ED Arrival Information       Expected   -    Arrival   6/25/2024 12:14    Acuity   Urgent              Means of arrival   Ambulance    Escorted by   [facility]    Service   Hospitalist    Admission type   Emergency              Arrival complaint   ems             Chief Complaint   Patient presents with    Medical Problem     Patient reports episode of phlegm, feeling lightheaded and leg swelling since this morning.       Initial Presentation: 79 y.o. male  to ED via EMS from home.    Admitted to observation with Dx: Suspected choking episode complicated by lightheadedness and full body paresthesias.   Presented to ED with  sudden phlegm coming out of nose and mouth after eating a grape and walnut.  Fatigue and full body numbness.  + lightheadedness.   PMHx:  hypertension, obesity, left hip arthroplasty on 06/18 . On exam:  Cranial nerves 2-12 intact. 5/5 strength in all four extremities including finger extension against resistance. Sensation to light touch subjectively intact/equal in all four extremities and the face. LLE edema.   ED treatment:  given IVF bolus.    Plan includes:  neuro checks.  Continue IVF if Orthostatic.  Hold home antihypertensives and monitor BP.     Anticipated Length of Stay/Certification Statement: Patient will be admitted on an observation basis with an anticipated length of stay of less than 2 midnights secondary to lightheaded     Date: 6/26/24    Day 2:     ED Triage Vitals   Temperature Pulse Respirations Blood Pressure SpO2 Pain Score   06/25/24 1216  06/25/24 1216 06/25/24 1216 06/25/24 1218 06/25/24 1216 06/25/24 2030   98.2 °F (36.8 °C) 70 18 170/86 99 % No Pain     Weight (last 2 days)       Date/Time Weight    06/25/24 1216 87.9 (193.78)            Vital Signs (last 3 days)       Date/Time Temp Pulse Resp BP MAP (mmHg) SpO2 O2 Device Patient Position - Orthostatic VS Pain    06/26/24 0726 98.2 °F (36.8 °C) 60 17 173/79 113 96 % None (Room air) Lying No Pain    06/25/24 2235 98.4 °F (36.9 °C) 51 18 162/76 109 96 % None (Room air) Lying --    06/25/24 2030 -- -- -- -- -- -- -- -- No Pain    06/25/24 1715 -- 57 -- 154/70 101 99 % None (Room air) -- --    06/25/24 1218 -- -- -- 170/86 -- -- -- -- --    06/25/24 1216 98.2 °F (36.8 °C) 70 18 -- -- 99 % None (Room air) -- --              Pertinent Labs/Diagnostic Test Results:   Radiology:  XR chest 1 view   Final Interpretation by Nga Hirsch MD (06/25 1448)      No acute pulmonary pathology.      Increase in the size of the cardiac silhouette since 2015.                  Workstation performed: [id number]         XR chest 1 view portable   ED Interpretation by Magnus Thakur MD (06/25 1320)   Per my independent interpretation: No acute cardiopulmonary process      Final Interpretation by Nga Hirsch MD (06/25 1448)      No acute pulmonary pathology.      Increase in the size of the cardiac silhouette since 2015.                  Workstation performed: [id number]           Cardiology:  No orders to display     GI:  No orders to display       Results from last 7 days   Lab Units 06/25/24  1235   SARS-COV-2  Negative     Results from last 7 days   Lab Units 06/26/24  0452 06/25/24 2020 06/25/24  1235   WBC Thousand/uL 6.87  --  8.48   HEMOGLOBIN g/dL 12.2  --  12.1   HEMATOCRIT % 36.8  --  36.3*   PLATELETS Thousands/uL 182 236 286   TOTAL NEUT ABS Thousands/µL 4.37  --  6.22     Results from last 7 days   Lab Units 06/26/24  0452 06/25/24  1308   SODIUM mmol/L 137 138   POTASSIUM mmol/L 3.4* 3.5    CHLORIDE mmol/L 103 102   CO2 mmol/L 28 25   ANION GAP mmol/L 6 11   BUN mg/dL 16 18   CREATININE mg/dL 0.88 0.89   EGFR ml/min/1.73sq m 81 81   CALCIUM mg/dL 9.1 9.8   MAGNESIUM mg/dL 2.2  --    PHOSPHORUS mg/dL 2.9  --      Results from last 7 days   Lab Units 06/26/24  0452 06/25/24  1308   AST U/L 15 20   ALT U/L 9 11   ALK PHOS U/L 52 56   TOTAL PROTEIN g/dL 6.8 7.2   ALBUMIN g/dL 4.0 4.3   TOTAL BILIRUBIN mg/dL 1.02* 0.94     Results from last 7 days   Lab Units 06/26/24  0452 06/25/24  1308   GLUCOSE RANDOM mg/dL 108 116     Results from last 7 days   Lab Units 06/25/24  1715 06/25/24  1541 06/25/24  1235   HS TNI 0HR ng/L  --   --  4   HS TNI 2HR ng/L  --  5  --    HSTNI D2 ng/L  --  1  --    HS TNI 4HR ng/L 5  --   --    HSTNI D4 ng/L 1  --   --      Results from last 7 days   Lab Units 06/26/24  0452   PROTIME seconds 13.2   INR  0.94   PTT seconds 33     Results from last 7 days   Lab Units 06/25/24  2021   TSH 3RD GENERATON uIU/mL 1.566     Results from last 7 days   Lab Units 06/25/24  1308   LIPASE u/L 7*     Results from last 7 days   Lab Units 06/25/24  1309   CLARITY UA  Clear   COLOR UA  Yellow   SPEC GRAV UA  1.012   PH UA  7.0   GLUCOSE UA mg/dl Negative   KETONES UA mg/dl Negative   BLOOD UA  Negative   PROTEIN UA mg/dl Negative   NITRITE UA  Negative   BILIRUBIN UA  Negative   UROBILINOGEN UA (BE) mg/dl <2.0   LEUKOCYTES UA  Negative     Results from last 7 days   Lab Units 06/25/24  1235   INFLUENZA A PCR  Negative   INFLUENZA B PCR  Negative   RSV PCR  Negative         ED Treatment-Medication Administration from 06/25/2024 1214 to 06/25/2024 1732         Date/Time Order Dose Route Action     06/25/2024 1341 sodium chloride 0.9 % bolus 500 mL 500 mL Intravenous New Bag            Past Medical History:   Diagnosis Date    Hypertension     Kidney stone     Obesity      Present on Admission:   Primary hypertension      Admitting Diagnosis: Numbness [R20.0]  Vomiting [R11.10]  Choking episode  [R09.89]  Ambulatory dysfunction [R26.2]  Age/Sex: 79 y.o. male  Admission Orders:  Scheduled Medications:  aspirin, 81 mg, Oral, HS  enoxaparin, 40 mg, Subcutaneous, Daily  montelukast, 10 mg, Oral, Daily  multivitamin stress formula, 1 tablet, Oral, Daily  potassium chloride, 20 mEq, Intravenous, Once 0830 on 6/26      Continuous IV Infusions: none      PRN Meds: not used   acetaminophen, 650 mg, Oral, Q4H PRN  HYDROmorphone, 0.2 mg, Intravenous, Q2H PRN  oxyCODONE, 2.5 mg, Oral, Q4H PRN   Or  oxyCODONE, 5 mg, Oral, Q4H PRN    Orthostatic BP  Neuro checks every 4 hours  Telemetry     None    Network Utilization Review Department  ATTENTION: Please call with any questions or concerns to [phone] and carefully listen to the prompts so that you are directed to the right person. All voicemails are confidential.   For Discharge needs, contact Care Management DC Support Team at [phone] opt. 2  Send all requests for admission clinical reviews, approved or denied determinations and any other requests to dedicated fax number below belonging to the campus where the patient is receiving treatment. List of dedicated fax numbers for the Facilities:  FACILITY NAME UR FAX NUMBER   ADMISSION DENIALS (Administrative/Medical Necessity) [phone]   DISCHARGE SUPPORT TEAM (NETWORK) [phone]   PARENT CHILD HEALTH (Maternity/NICU/Pediatrics) [phone]   [facility] [phone]   [facility] [phone]   [facility] [phone]   [facility] [phone]   [facility] [phone]   [facility] [phone]   [facility] [phone]   [facility] [phone]   [facility] [phone]   [facility] [phone]   [facility]  [place] [phone]   [facility] [place] [phone]

## 2024-06-26 NOTE — ASSESSMENT & PLAN NOTE
Patient presenting with sudden phlegm production, lightheadedness, and numbness.    Patient's symptoms resolved after presenting to the hospital.    Physical exam showed intact cranial nerves II through XII and no focal deficits.    Currently low suspicion for intracranial abnormality  Of note patient had recent left hip surgery  Possibly due to orthostasis but etiology at this time is not find.     Plan:  Will place on neurochecks.  Can consider IV fluids if orthostasis present  Obtain orthostatics  Placed on neurochecks every 4 hours  Can consider CT head if new neuro symptoms present

## 2024-06-26 NOTE — PLAN OF CARE
Problem: Potential for Falls  Goal: Patient will remain free of falls  Description: INTERVENTIONS:  - Educate patient/family on patient safety including physical limitations  - Instruct patient to call for assistance with activity   - Consult OT/PT to assist with strengthening/mobility   - Keep Call bell within reach  - Keep bed low and locked with side rails adjusted as appropriate  - Keep care items and personal belongings within reach  - Initiate and maintain comfort rounds  - Make Fall Risk Sign visible to staff  - Offer Toileting every  Hours, in advance of need  - Initiate/Maintain alarm  - Obtain necessary fall risk management equipment:  - Apply yellow socks and bracelet for high fall risk patients  - Consider moving patient to room near nurses station  Outcome: Progressing     Problem: Prexisting or High Potential for Compromised Skin Integrity  Goal: Skin integrity is maintained or improved  Description: INTERVENTIONS:  - Identify patients at risk for skin breakdown  - Assess and monitor skin integrity  - Assess and monitor nutrition and hydration status  - Monitor labs   - Assess for incontinence   - Turn and reposition patient  - Assist with mobility/ambulation  - Relieve pressure over bony prominences  - Avoid friction and shearing  - Provide appropriate hygiene as needed including keeping skin clean and dry  - Evaluate need for skin moisturizer/barrier cream  - Collaborate with interdisciplinary team   - Patient/family teaching  - Consider wound care consult   Outcome: Progressing     Problem: PAIN - ADULT  Goal: Verbalizes/displays adequate comfort level or baseline comfort level  Description: Interventions:  - Encourage patient to monitor pain and request assistance  - Assess pain using appropriate pain scale  - Administer analgesics based on type and severity of pain and evaluate response  - Implement non-pharmacological measures as appropriate and evaluate response  - Consider cultural and social  influences on pain and pain management  - Notify physician/advanced practitioner if interventions unsuccessful or patient reports new pain  Outcome: Progressing     Problem: INFECTION - ADULT  Goal: Absence or prevention of progression during hospitalization  Description: INTERVENTIONS:  - Assess and monitor for signs and symptoms of infection  - Monitor lab/diagnostic results  - Monitor all insertion sites, i.e. indwelling lines, tubes, and drains  - Monitor endotracheal if appropriate and nasal secretions for changes in amount and color  - [place] appropriate cooling/warming therapies per order  - Administer medications as ordered  - Instruct and encourage patient and family to use good hand hygiene technique  - Identify and instruct in appropriate isolation precautions for identified infection/condition  Outcome: Progressing  Goal: Absence of fever/infection during neutropenic period  Description: INTERVENTIONS:  - Monitor WBC    Outcome: Progressing     Problem: SAFETY ADULT  Goal: Patient will remain free of falls  Description: INTERVENTIONS:  - Educate patient/family on patient safety including physical limitations  - Instruct patient to call for assistance with activity   - Consult OT/PT to assist with strengthening/mobility   - Keep Call bell within reach  - Keep bed low and locked with side rails adjusted as appropriate  - Keep care items and personal belongings within reach  - Initiate and maintain comfort rounds  - Make Fall Risk Sign visible to staff  - Offer Toileting every  Hours, in advance of need  - Initiate/Maintain alarm  - Obtain necessary fall risk management equipment:   - Apply yellow socks and bracelet for high fall risk patients  - Consider moving patient to room near nurses station  Outcome: Progressing  Goal: Maintain or return to baseline ADL function  Description: INTERVENTIONS:  -  Assess patient's ability to carry out ADLs; assess patient's baseline for ADL function and identify  physical deficits which impact ability to perform ADLs (bathing, care of mouth/teeth, toileting, grooming, dressing, etc.)  - Assess/evaluate cause of self-care deficits   - Assess range of motion  - Assess patient's mobility; develop plan if impaired  - Assess patient's need for assistive devices and provide as appropriate  - Encourage maximum independence but intervene and supervise when necessary  - Involve family in performance of ADLs  - Assess for home care needs following discharge   - Consider OT consult to assist with ADL evaluation and planning for discharge  - Provide patient education as appropriate  Outcome: Progressing  Goal: Maintains/Returns to pre admission functional level  Description: INTERVENTIONS:  - Perform AM-PAC 6 Click Basic Mobility/ Daily Activity assessment daily.  - Set and communicate daily mobility goal to care team and patient/family/caregiver.   - Collaborate with rehabilitation services on mobility goals if consulted  - Perform Range of Motion  times a day.  - Reposition patient every  hours.  - Dangle patient  times a day  - Stand patient times a day  - Ambulate patient  times a day  - Out of bed to chair  times a day   - Out of bed for meals  times a day  - Out of bed for toileting  - Record patient progress and toleration of activity level   Outcome: Progressing     Problem: DISCHARGE PLANNING  Goal: Discharge to home or other facility with appropriate resources  Description: INTERVENTIONS:  - Identify barriers to discharge w/patient and caregiver  - Arrange for needed discharge resources and transportation as appropriate  - Identify discharge learning needs (meds, wound care, etc.)  - Arrange for interpretive services to assist at discharge as needed  - Refer to Case Management Department for coordinating discharge planning if the patient needs post-hospital services based on physician/advanced practitioner order or complex needs related to functional status, cognitive ability,  or social support system  Outcome: Progressing     Problem: Knowledge Deficit  Goal: Patient/family/caregiver demonstrates understanding of disease process, treatment plan, medications, and discharge instructions  Description: Complete learning assessment and assess knowledge base.  Interventions:  - Provide teaching at level of understanding  - Provide teaching via preferred learning methods  Outcome: Progressing

## 2024-06-26 NOTE — DISCHARGE INSTR - AVS FIRST PAGE
Dear Jarad Beavers,     It was our pleasure to care for you here at [facility].  It is our hope that we were always able to exceed the expected standards for your care during your stay.  You were hospitalized due to lightheadedness.  You were cared for on the MedSurg floor by Eddie Mckenzie MD under the service of Godwin Young MD with the [facility] who covers for your primary care physician (PCP), Chantel Campbell MD, while you were hospitalized.  If you have any questions or concerns related to this hospitalization, you may contact us at .  For follow up as well as any medication refills, we recommend that you follow up with your primary care physician.  A registered nurse will reach out to you by phone within a few days after your discharge to answer any additional questions that you may have after going home.  However, at this time we provide for you here, the most important instructions / recommendations at discharge:     Notable Medication Adjustments -   None  Testing Required after Discharge -   None  ** Please contact your PCP to request testing orders for any of the testing recommended here **  Important follow up information -   Please see your PCP within 1 week of discharge for better management of your blood pressure  Other Instructions -   We hope you feel better soon. If your symptoms worsen, please call 911 immediately or go to the nearest Emergency Department.   Please review this entire after visit summary as additional general instructions including medication list, appointments, activity, diet, any pertinent wound care, and other additional recommendations from your care team that may be provided for you.      Sincerely,     Eddie Mckenzie MD

## 2024-06-26 NOTE — DISCHARGE SUMMARY
Former[facility]  Discharge- Jarad Beavers 1945, 79 y.o. male MRN: [id number]  Unit/Bed#: [facility] Encounter: [id number]  Primary Care Provider: Chantel Campbell MD   Date and time admitted to hospital: 6/25/2024 12:14 PM    Primary hypertension  Assessment & Plan  Continue home medications on discharge    * Lightheadedness  Assessment & Plan  Patient presenting with sudden phlegm production, lightheadedness, and numbness.    Patient's symptoms resolved after presenting to the hospital.    Physical exam showed intact cranial nerves II through XII and no focal deficits.    Currently low suspicion for intracranial abnormality  Of note patient had recent left hip surgery  Patient endorses improvement in symptoms.  Has been tolerating diet, was able to ambulate.    Plan:  Stable for discharge        Medical Problems       Resolved Problems  Date Reviewed: 6/26/2024   None       Discharging Resident: Eddie Mckenzie MD  Discharging Attending: No att. providers found  PCP: Chantel Campbell MD  Admission Date:   Admission Orders (From admission, onward)       Ordered        06/25/24 1623  Place in Observation  Once                          Discharge Date: 06/26/24    Consultations During Hospital Stay:  None    Procedures Performed:   None    Significant Findings / Test Results:   XR chest 1 view   Final Result by Nga Hirsch MD (06/25 7517)      No acute pulmonary pathology.      Increase in the size of the cardiac silhouette since 2015.                  Workstation performed: [id number]         XR chest 1 view portable   ED Interpretation by Magnus Thakur MD (06/25 1320)   Per my independent interpretation: No acute cardiopulmonary process      Final Result by Nga Hirsch MD (06/25 5157)      No acute pulmonary pathology.      Increase in the size of the cardiac silhouette since 2015.                  Workstation performed: [id number]              Incidental Findings:   None      Test Results Pending at Discharge (will require follow up):  None     Outpatient Tests Requested:  None    Complications:  None    Reason for Admission: Lightheadedness    Hospital Course:   Jarad Beavers is a 79 y.o. male patient who originally presented to the hospital on 6/25/2024 due to lightheadedness, cough.  Patient was then his normal state of health when he woke up.  After eating some grapes and walnuts he presumably had a coughing/choking episode which was the cause of his lightheadedness.  During the hospital course, his symptoms spontaneously resolved.  He was evaluated this morning and he mentions that he is in his baseline state of health.  Does not endorse any chest pain, shortness of breath, abdominal discomfort, nausea, vomiting, diarrhea, constipation, any issues with bladder or bowel movements.  Does not endorse any lightheadedness anymore.  He was given diet, he tolerated the diet and was able to ambulate with the help of a walker (his baseline).  He is fit for discharge.    The patient, initially admitted to the hospital as inpatient, was discharged earlier than expected given the following: Resolution of symptoms.    Please see above list of diagnoses and related plan for additional information.     Condition at Discharge: stable    Discharge Day Visit / Exam:   Subjective: As mentioned in hospital course  Vitals: Blood Pressure: (!) 173/74 (06/26/24 0908)  Pulse: 60 (06/26/24 0726)  Temperature: 98.2 °F (36.8 °C) (06/26/24 0726)  Temp Source: Oral (06/26/24 0726)  Respirations: 17 (06/26/24 0726)  Weight - Scale: 87.9 kg (193 lb 12.6 oz) (06/25/24 1216)  SpO2: 96 % (06/26/24 0726)  Exam:   Physical Exam  Vitals and nursing note reviewed.   Constitutional:       General: He is not in acute distress.     Appearance: He is well-developed. He is obese.   HENT:      Head: Normocephalic and atraumatic.   Eyes:      Conjunctiva/sclera: Conjunctivae normal.   Cardiovascular:      Rate and Rhythm:  Normal rate and regular rhythm.      Heart sounds: No murmur heard.  Pulmonary:      Effort: Pulmonary effort is normal. No respiratory distress.      Breath sounds: Normal breath sounds.   Abdominal:      Palpations: Abdomen is soft.      Tenderness: There is no abdominal tenderness.   Musculoskeletal:         General: No swelling.      Cervical back: Neck supple.   Skin:     General: Skin is warm and dry.      Capillary Refill: Capillary refill takes less than 2 seconds.   Neurological:      Mental Status: He is alert.   Psychiatric:         Mood and Affect: Mood normal.          Discussion with Family: Patient declined call to .     Discharge instructions/Information to patient and family:   See after visit summary for information provided to patient and family.      Provisions for Follow-Up Care:  See after visit summary for information related to follow-up care and any pertinent home health orders.      Mobility at time of Discharge:   Basic Mobility Inpatient Raw Score: 17  JH-HLM Goal: 5: Stand one or more mins  JH-HLM Achieved: 4: Move to chair/commode  HLM Goal achieved. Continue to encourage appropriate mobility.     Disposition:   Home    Planned Readmission: no    Discharge Medications:  See after visit summary for reconciled discharge medications provided to patient and/or family.      **Please Note: This note may have been constructed using a voice recognition system**

## 2024-06-26 NOTE — CASE MANAGEMENT
Case Management Assessment    Patient name Jarad Beavers  Location /-01 MRN [id number]  : 1945 Date 2024       Current Admission Date: 2024  Current Admission Diagnosis:Lightheadedness   Patient Active Problem List    Diagnosis Date Noted Date Diagnosed    Lightheadedness 2024     Elevated PSA 2022     BPH associated with nocturia 2021     Candidal dermatitis 10/21/2019     Dyslipidemia 2019     Seasonal allergic rhinitis 10/15/2018     Prediabetes 10/13/2018     Lumbar spondylosis 2018     Primary hypertension 2018     History of total left hip arthroplasty 2018     Obesity (BMI 30.0-34.9) 2018     Nephrolithiasis 2018       LOS (days): 0  Geometric Mean LOS (GMLOS) (days):   Days to GMLOS:     OBJECTIVE:              Current admission status: Observation       Preferred Pharmacy:   [facility] [place] - [address]  [address] [address]  Phone: [phone] Fax: [phone]    Primary Care Provider: Chantel Campbell MD    Primary Insurance: [employer]  Secondary Insurance:     ASSESSMENT:  Active Health Care Proxies    There are no active Health Care Proxies on file.                 Readmission Root Cause  30 Day Readmission: No    Patient Information  Admitted from:: Home  Mental Status: Alert  During Assessment patient was accompanied by: Not accompanied during assessment  Assessment information provided by:: Patient  Support Systems: Self, Family members, Son  Home entry access options. Select all that apply.: No steps to enter home, Ramp  Type of Current Residence: [facility]  Living Arrangements: Lives Alone  Is patient a ?: Yes  Is patient active with [facility] [facility]: Yes    Activities of Daily Living Prior to Admission  Functional Status: Independent  Completes ADLs independently?: Yes  Ambulates independently?: Yes  Does patient use assisted devices?: Yes  Assisted  Devices (DME) used: Walker, Straight Cane  Does patient currently own DME?: Yes  What DME does the patient currently own?: Straight Cane, Walker  Does patient have a history of Outpatient Therapy (PT/OT)?: Yes  Does the patient have a history of Short-Term Rehab?: No  Does patient have a history of HHC?: No  Does patient currently have HHC?: No         Patient Information Continued  Income Source: Pension/alf  Does patient have prescription coverage?: Yes  Does patient receive dialysis treatments?: No  Does patient have a history of substance abuse?: No  Does patient have a history of Mental Health Diagnosis?: No                Social Determinants of Health (SDOH)      Flowsheet Row Most Recent Value   Housing Stability    In the last 12 months, was there a time when you were not able to pay the mortgage or rent on time? N   At any time in the past 12 months, were you homeless or living in a shelter (including now)? N   Transportation Needs    In the past 12 months, has lack of transportation kept you from medical appointments or from getting medications? no   In the past 12 months, has lack of transportation kept you from meetings, work, or from getting things needed for daily living? No   Food Insecurity    Within the past 12 months, you worried that your food would run out before you got the money to buy more. Never true   Within the past 12 months, the food you bought just didn't last and you didn't have money to get more. Never true   Utilities    In the past 12 months has the electric, gas, oil, or water company threatened to shut off services in your home? No

## 2024-06-26 NOTE — ASSESSMENT & PLAN NOTE
Patient presenting with sudden phlegm production, lightheadedness, and numbness.    Patient's symptoms resolved after presenting to the hospital.    Physical exam showed intact cranial nerves II through XII and no focal deficits.    Currently low suspicion for intracranial abnormality  Of note patient had recent left hip surgery  Patient endorses improvement in symptoms.  Has been tolerating diet, was able to ambulate.    Plan:  Stable for discharge

## 2024-06-28 ENCOUNTER — TELEPHONE (OUTPATIENT)
Age: 79
End: 2024-06-28

## 2024-06-28 NOTE — TELEPHONE ENCOUNTER
Pt returned call stating he feels fine and does not want a TCM appt after overnight hospital stay. Pt stated he has a lot of appts coming up for his hip and does not have time for any other appts.